# Patient Record
Sex: MALE | Race: ASIAN | NOT HISPANIC OR LATINO
[De-identification: names, ages, dates, MRNs, and addresses within clinical notes are randomized per-mention and may not be internally consistent; named-entity substitution may affect disease eponyms.]

---

## 2017-02-16 ENCOUNTER — MEDICATION RENEWAL (OUTPATIENT)
Age: 77
End: 2017-02-16

## 2017-02-16 ENCOUNTER — LABORATORY RESULT (OUTPATIENT)
Age: 77
End: 2017-02-16

## 2017-02-16 ENCOUNTER — NON-APPOINTMENT (OUTPATIENT)
Age: 77
End: 2017-02-16

## 2017-02-16 ENCOUNTER — APPOINTMENT (OUTPATIENT)
Dept: CARDIOLOGY | Facility: CLINIC | Age: 77
End: 2017-02-16

## 2017-02-16 VITALS
DIASTOLIC BLOOD PRESSURE: 71 MMHG | OXYGEN SATURATION: 83 % | WEIGHT: 172 LBS | BODY MASS INDEX: 26.07 KG/M2 | HEIGHT: 68 IN | HEART RATE: 53 BPM | SYSTOLIC BLOOD PRESSURE: 127 MMHG

## 2017-02-16 DIAGNOSIS — Z01.810 ENCOUNTER FOR PREPROCEDURAL CARDIOVASCULAR EXAMINATION: ICD-10-CM

## 2017-02-16 DIAGNOSIS — Z87.09 PERSONAL HISTORY OF OTHER DISEASES OF THE RESPIRATORY SYSTEM: ICD-10-CM

## 2017-02-17 ENCOUNTER — APPOINTMENT (OUTPATIENT)
Dept: CARDIOLOGY | Facility: CLINIC | Age: 77
End: 2017-02-17

## 2017-02-22 ENCOUNTER — TRANSCRIPTION ENCOUNTER (OUTPATIENT)
Age: 77
End: 2017-02-22

## 2017-02-22 ENCOUNTER — OUTPATIENT (OUTPATIENT)
Dept: OUTPATIENT SERVICES | Facility: HOSPITAL | Age: 77
LOS: 1 days | End: 2017-02-22
Payer: MEDICARE

## 2017-02-22 VITALS
HEART RATE: 55 BPM | DIASTOLIC BLOOD PRESSURE: 82 MMHG | OXYGEN SATURATION: 100 % | SYSTOLIC BLOOD PRESSURE: 138 MMHG | WEIGHT: 170.2 LBS | RESPIRATION RATE: 13 BRPM | HEIGHT: 69 IN | TEMPERATURE: 98 F

## 2017-02-22 VITALS
OXYGEN SATURATION: 99 % | HEART RATE: 50 BPM | DIASTOLIC BLOOD PRESSURE: 76 MMHG | SYSTOLIC BLOOD PRESSURE: 111 MMHG | RESPIRATION RATE: 12 BRPM

## 2017-02-22 DIAGNOSIS — H25.11 AGE-RELATED NUCLEAR CATARACT, RIGHT EYE: ICD-10-CM

## 2017-02-22 DIAGNOSIS — I48.91 UNSPECIFIED ATRIAL FIBRILLATION: Chronic | ICD-10-CM

## 2017-02-22 PROCEDURE — 66984 XCAPSL CTRC RMVL W/O ECP: CPT | Mod: RT

## 2017-02-22 PROCEDURE — C1780: CPT

## 2017-02-22 NOTE — ASU PATIENT PROFILE, ADULT - PMH
AF (atrial fibrillation)    BPH (Benign Prostatic Hyperplasia)    HLD (hyperlipidemia)    HTN (hypertension)    Thoracic aortic aneurysm without rupture

## 2017-02-22 NOTE — ASU DISCHARGE PLAN (ADULT/PEDIATRIC). - PT EDUC
Intraocular lens implant(IOL), Eye shield with instructions , sunglasses and eye kit given to patient./other (specify)/Implant card (specify)

## 2017-02-22 NOTE — ASU DISCHARGE PLAN (ADULT/PEDIATRIC). - NOTIFY
Fever greater than 101/Swelling that continues/Pain not relieved by Medications/Bleeding that does not stop/Persistent Nausea and Vomiting

## 2017-02-23 LAB
25(OH)D3 SERPL-MCNC: 31 NG/ML
ALBUMIN SERPL ELPH-MCNC: 4.1 G/DL
ALP BLD-CCNC: 62 U/L
ALT SERPL-CCNC: 17 U/L
ANION GAP SERPL CALC-SCNC: 15 MMOL/L
APPEARANCE: CLEAR
AST SERPL-CCNC: 24 U/L
BACTERIA: NEGATIVE
BASOPHILS # BLD AUTO: 0.03 K/UL
BASOPHILS NFR BLD AUTO: 0.7 %
BILIRUB SERPL-MCNC: 0.7 MG/DL
BILIRUBIN URINE: NEGATIVE
BLOOD URINE: NEGATIVE
BUN SERPL-MCNC: 18 MG/DL
CALCIUM SERPL-MCNC: 9 MG/DL
CHLORIDE SERPL-SCNC: 101 MMOL/L
CHOLEST SERPL-MCNC: 164 MG/DL
CHOLEST/HDLC SERPL: 2.1 RATIO
CO2 SERPL-SCNC: 24 MMOL/L
COLOR: YELLOW
CREAT SERPL-MCNC: 1.05 MG/DL
EOSINOPHIL # BLD AUTO: 0.06 K/UL
EOSINOPHIL NFR BLD AUTO: 1.5 %
GLUCOSE QUALITATIVE U: NORMAL MG/DL
GLUCOSE SERPL-MCNC: 103 MG/DL
HBA1C MFR BLD HPLC: 5.4 %
HCT VFR BLD CALC: 44.5 %
HDLC SERPL-MCNC: 78 MG/DL
HGB BLD-MCNC: 14.7 G/DL
IMM GRANULOCYTES NFR BLD AUTO: 0.2 %
KETONES URINE: NEGATIVE
LDLC SERPL CALC-MCNC: 74 MG/DL
LEUKOCYTE ESTERASE URINE: NEGATIVE
LYMPHOCYTES # BLD AUTO: 1.33 K/UL
LYMPHOCYTES NFR BLD AUTO: 32.2 %
MAN DIFF?: NORMAL
MCHC RBC-ENTMCNC: 30.5 PG
MCHC RBC-ENTMCNC: 33 GM/DL
MCV RBC AUTO: 92.3 FL
MICROSCOPIC-UA: NORMAL
MONOCYTES # BLD AUTO: 0.25 K/UL
MONOCYTES NFR BLD AUTO: 6.1 %
NEUTROPHILS # BLD AUTO: 2.45 K/UL
NEUTROPHILS NFR BLD AUTO: 59.3 %
NITRITE URINE: NEGATIVE
PH URINE: 5.5
PLATELET # BLD AUTO: 257 K/UL
POTASSIUM SERPL-SCNC: 4.9 MMOL/L
PROT SERPL-MCNC: 6.9 G/DL
PROTEIN URINE: NEGATIVE MG/DL
RBC # BLD: 4.82 M/UL
RBC # FLD: 13.5 %
RED BLOOD CELLS URINE: 0 /HPF
SODIUM SERPL-SCNC: 140 MMOL/L
SPECIFIC GRAVITY URINE: 1.02
SQUAMOUS EPITHELIAL CELLS: 0 /HPF
TRIGL SERPL-MCNC: 58 MG/DL
TSH SERPL-ACNC: 1.8 UIU/ML
UROBILINOGEN URINE: NORMAL MG/DL
WBC # FLD AUTO: 4.13 K/UL
WHITE BLOOD CELLS URINE: 0 /HPF

## 2017-02-28 ENCOUNTER — MEDICATION RENEWAL (OUTPATIENT)
Age: 77
End: 2017-02-28

## 2017-03-27 NOTE — ASU PREOP CHECKLIST - LAST DOSE WITHIN LAST 24HRS
" Outpatient Physical Therapy Progress Note     Time in: 11:00  Time out: 12:00  Ther ex time: 40 min    Visit # 5    Subjective:  Pt states L shld feels "about the same" and rates pain as 0/10.  States doing HEP as instructed.    Objective: Tight posterior capsule.  TTP middle deltoid muscle.    Treatment:  There ex and modalities for increased ROM/strength and improved ADL to include:  UBE x 6 min, theraband x 3 ways, theraband IR/ER side steps, prone rows with 5#, serratus punches with 5#, prone ext 90-0 with 3#, theraband clock, ER ball drop/catch, wall walks with theraband, manual co-contractions, SL ER and HABD with 2#, PROM/stretching x 8 min, HEP review and CP x 10 min.    Assessment:  No pain with exercise. Tolerated increased exercises well.     Will the patient continue to benefit from skilled PT intervention?     yes    Medical necessity is demonstrated by:   Pain limits function of effected part for all activities  Unable to participate fully in daily activities       Progress towards goals: fair    New/Revised Goals:    Plan:  Continue with established Plan of Care toward PT goals.          "
Yes

## 2017-05-22 ENCOUNTER — RX RENEWAL (OUTPATIENT)
Age: 77
End: 2017-05-22

## 2017-06-05 ENCOUNTER — RX RENEWAL (OUTPATIENT)
Age: 77
End: 2017-06-05

## 2017-06-08 ENCOUNTER — MEDICATION RENEWAL (OUTPATIENT)
Age: 77
End: 2017-06-08

## 2017-06-21 DIAGNOSIS — M25.562 PAIN IN LEFT KNEE: ICD-10-CM

## 2017-06-21 DIAGNOSIS — G89.29 PAIN IN LEFT KNEE: ICD-10-CM

## 2017-06-23 ENCOUNTER — EMERGENCY (EMERGENCY)
Facility: HOSPITAL | Age: 77
LOS: 1 days | Discharge: HOME | End: 2017-06-23
Attending: INTERNAL MEDICINE | Admitting: INTERNAL MEDICINE
Payer: MEDICARE

## 2017-06-23 VITALS — HEART RATE: 71 BPM | DIASTOLIC BLOOD PRESSURE: 71 MMHG | SYSTOLIC BLOOD PRESSURE: 129 MMHG | TEMPERATURE: 98 F

## 2017-06-23 VITALS
DIASTOLIC BLOOD PRESSURE: 75 MMHG | OXYGEN SATURATION: 99 % | RESPIRATION RATE: 14 BRPM | TEMPERATURE: 99 F | SYSTOLIC BLOOD PRESSURE: 125 MMHG | HEIGHT: 69 IN | WEIGHT: 169.98 LBS | HEART RATE: 68 BPM

## 2017-06-23 DIAGNOSIS — I48.91 UNSPECIFIED ATRIAL FIBRILLATION: Chronic | ICD-10-CM

## 2017-06-23 LAB
ALBUMIN SERPL ELPH-MCNC: 3.1 G/DL — LOW (ref 3.3–5)
ALP SERPL-CCNC: 67 U/L — SIGNIFICANT CHANGE UP (ref 30–120)
ALT FLD-CCNC: 27 U/L DA — SIGNIFICANT CHANGE UP (ref 10–60)
ANION GAP SERPL CALC-SCNC: 4 MMOL/L — LOW (ref 5–17)
APPEARANCE UR: CLEAR — SIGNIFICANT CHANGE UP
APTT BLD: 29.5 SEC — SIGNIFICANT CHANGE UP (ref 27.5–37.4)
AST SERPL-CCNC: 23 U/L — SIGNIFICANT CHANGE UP (ref 10–40)
BASOPHILS # BLD AUTO: 0.1 K/UL — SIGNIFICANT CHANGE UP (ref 0–0.2)
BASOPHILS NFR BLD AUTO: 0.5 % — SIGNIFICANT CHANGE UP (ref 0–2)
BILIRUB SERPL-MCNC: 0.6 MG/DL — SIGNIFICANT CHANGE UP (ref 0.2–1.2)
BILIRUB UR-MCNC: NEGATIVE — SIGNIFICANT CHANGE UP
BUN SERPL-MCNC: 19 MG/DL — SIGNIFICANT CHANGE UP (ref 7–23)
CALCIUM SERPL-MCNC: 8.4 MG/DL — SIGNIFICANT CHANGE UP (ref 8.4–10.5)
CHLORIDE SERPL-SCNC: 104 MMOL/L — SIGNIFICANT CHANGE UP (ref 96–108)
CO2 SERPL-SCNC: 30 MMOL/L — SIGNIFICANT CHANGE UP (ref 22–31)
COLOR SPEC: YELLOW — SIGNIFICANT CHANGE UP
CREAT SERPL-MCNC: 1.23 MG/DL — SIGNIFICANT CHANGE UP (ref 0.5–1.3)
DIFF PNL FLD: NEGATIVE — SIGNIFICANT CHANGE UP
EOSINOPHIL # BLD AUTO: 0.1 K/UL — SIGNIFICANT CHANGE UP (ref 0–0.5)
EOSINOPHIL NFR BLD AUTO: 0.4 % — SIGNIFICANT CHANGE UP (ref 0–6)
GLUCOSE SERPL-MCNC: 127 MG/DL — HIGH (ref 70–99)
GLUCOSE UR QL: NEGATIVE MG/DL — SIGNIFICANT CHANGE UP
HCT VFR BLD CALC: 43.6 % — SIGNIFICANT CHANGE UP (ref 39–50)
HGB BLD-MCNC: 14 G/DL — SIGNIFICANT CHANGE UP (ref 13–17)
INR BLD: 1.07 RATIO — SIGNIFICANT CHANGE UP (ref 0.88–1.16)
KETONES UR-MCNC: NEGATIVE — SIGNIFICANT CHANGE UP
LACTATE SERPL-SCNC: 0.9 MMOL/L — SIGNIFICANT CHANGE UP (ref 0.7–2)
LEUKOCYTE ESTERASE UR-ACNC: NEGATIVE — SIGNIFICANT CHANGE UP
LYMPHOCYTES # BLD AUTO: 1.2 K/UL — SIGNIFICANT CHANGE UP (ref 1–3.3)
LYMPHOCYTES # BLD AUTO: 9.6 % — LOW (ref 13–44)
MCHC RBC-ENTMCNC: 30.7 PG — SIGNIFICANT CHANGE UP (ref 27–34)
MCHC RBC-ENTMCNC: 32.2 GM/DL — SIGNIFICANT CHANGE UP (ref 32–36)
MCV RBC AUTO: 95.4 FL — SIGNIFICANT CHANGE UP (ref 80–100)
MONOCYTES # BLD AUTO: 1 K/UL — HIGH (ref 0–0.9)
MONOCYTES NFR BLD AUTO: 8.1 % — SIGNIFICANT CHANGE UP (ref 2–14)
NEUTROPHILS # BLD AUTO: 10.2 K/UL — HIGH (ref 1.8–7.4)
NEUTROPHILS NFR BLD AUTO: 81.4 % — HIGH (ref 43–77)
NITRITE UR-MCNC: NEGATIVE — SIGNIFICANT CHANGE UP
PH UR: 7 — SIGNIFICANT CHANGE UP (ref 5–8)
PLATELET # BLD AUTO: 221 K/UL — SIGNIFICANT CHANGE UP (ref 150–400)
POTASSIUM SERPL-MCNC: 4.2 MMOL/L — SIGNIFICANT CHANGE UP (ref 3.5–5.3)
POTASSIUM SERPL-SCNC: 4.2 MMOL/L — SIGNIFICANT CHANGE UP (ref 3.5–5.3)
PROT SERPL-MCNC: 6.9 G/DL — SIGNIFICANT CHANGE UP (ref 6–8.3)
PROT UR-MCNC: NEGATIVE MG/DL — SIGNIFICANT CHANGE UP
PROTHROM AB SERPL-ACNC: 11.7 SEC — SIGNIFICANT CHANGE UP (ref 9.8–12.7)
RBC # BLD: 4.57 M/UL — SIGNIFICANT CHANGE UP (ref 4.2–5.8)
RBC # FLD: 12.1 % — SIGNIFICANT CHANGE UP (ref 10.3–14.5)
SODIUM SERPL-SCNC: 138 MMOL/L — SIGNIFICANT CHANGE UP (ref 135–145)
SP GR SPEC: 1.01 — SIGNIFICANT CHANGE UP (ref 1.01–1.02)
UROBILINOGEN FLD QL: NEGATIVE MG/DL — SIGNIFICANT CHANGE UP
WBC # BLD: 12.5 K/UL — HIGH (ref 3.8–10.5)
WBC # FLD AUTO: 12.5 K/UL — HIGH (ref 3.8–10.5)

## 2017-06-23 PROCEDURE — 85027 COMPLETE CBC AUTOMATED: CPT

## 2017-06-23 PROCEDURE — 83605 ASSAY OF LACTIC ACID: CPT

## 2017-06-23 PROCEDURE — 85610 PROTHROMBIN TIME: CPT

## 2017-06-23 PROCEDURE — 80053 COMPREHEN METABOLIC PANEL: CPT

## 2017-06-23 PROCEDURE — 96361 HYDRATE IV INFUSION ADD-ON: CPT

## 2017-06-23 PROCEDURE — 96374 THER/PROPH/DIAG INJ IV PUSH: CPT

## 2017-06-23 PROCEDURE — 99284 EMERGENCY DEPT VISIT MOD MDM: CPT | Mod: 25

## 2017-06-23 PROCEDURE — 99285 EMERGENCY DEPT VISIT HI MDM: CPT

## 2017-06-23 PROCEDURE — 87040 BLOOD CULTURE FOR BACTERIA: CPT

## 2017-06-23 PROCEDURE — 81003 URINALYSIS AUTO W/O SCOPE: CPT

## 2017-06-23 PROCEDURE — 85730 THROMBOPLASTIN TIME PARTIAL: CPT

## 2017-06-23 PROCEDURE — 87086 URINE CULTURE/COLONY COUNT: CPT

## 2017-06-23 RX ORDER — PIPERACILLIN AND TAZOBACTAM 4; .5 G/20ML; G/20ML
3.38 INJECTION, POWDER, LYOPHILIZED, FOR SOLUTION INTRAVENOUS ONCE
Qty: 0 | Refills: 0 | Status: COMPLETED | OUTPATIENT
Start: 2017-06-23 | End: 2017-06-23

## 2017-06-23 RX ORDER — SODIUM CHLORIDE 9 MG/ML
1310 INJECTION INTRAMUSCULAR; INTRAVENOUS; SUBCUTANEOUS ONCE
Qty: 0 | Refills: 0 | Status: COMPLETED | OUTPATIENT
Start: 2017-06-23 | End: 2017-06-23

## 2017-06-23 RX ORDER — SODIUM CHLORIDE 9 MG/ML
1000 INJECTION INTRAMUSCULAR; INTRAVENOUS; SUBCUTANEOUS ONCE
Qty: 0 | Refills: 0 | Status: COMPLETED | OUTPATIENT
Start: 2017-06-23 | End: 2017-06-23

## 2017-06-23 RX ADMIN — SODIUM CHLORIDE 1310 MILLILITER(S): 9 INJECTION INTRAMUSCULAR; INTRAVENOUS; SUBCUTANEOUS at 22:40

## 2017-06-23 RX ADMIN — PIPERACILLIN AND TAZOBACTAM 200 GRAM(S): 4; .5 INJECTION, POWDER, LYOPHILIZED, FOR SOLUTION INTRAVENOUS at 22:18

## 2017-06-23 RX ADMIN — SODIUM CHLORIDE 2000 MILLILITER(S): 9 INJECTION INTRAMUSCULAR; INTRAVENOUS; SUBCUTANEOUS at 22:00

## 2017-06-23 NOTE — ED PROVIDER NOTE - OBJECTIVE STATEMENT
78 y/o M with pmhx of prostatitis and 1 episode of sepsis secondary to prostatitis 4 years ago presents to the ED c/o urinary symptoms. Pt states he has been experiencing fever and chills since yesterday and urinary sx consistent with prostatitis. Previous episode of sepsis due to questionable multi-drug resistant bacteria. Started himself on Levaquin, taken yesterday and this morning. Denies CP, SOB or any other complaints at this time. 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 Pt states he has been experiencing fever and chills since yesterday and urinary sx consistent with prostatitis. Previous episode of sepsis due to questionable multi-drug resistant bacteria. Started himself on Levaquin, taken yesterday and this morning. Denies CP, SOB or any other complaints at this time.

## 2017-06-23 NOTE — ED PROVIDER NOTE - MEDICAL DECISION MAKING DETAILS
urologist with h/o prostatitis/sepsis in past...x yest with fever/chills and urinary sx rxd by self with levaquin...ua today totally nl..rxd in er with septic protocol i.e. zosyn,ivf..lactate nl...will go home and rt er if sx worsen. urologist with h/o prostatitis/sepsis in past...since yest with fever/chills and urinary sx rxd by self with levaquin...ua today totally nl..rxd in er with septic protocol i.e. zosyn,ivf..lactate nl...will go home and rt er if sx worsen.will continue levaquin.

## 2017-06-25 LAB
CULTURE RESULTS: NO GROWTH — SIGNIFICANT CHANGE UP
SPECIMEN SOURCE: SIGNIFICANT CHANGE UP

## 2017-06-29 LAB
CULTURE RESULTS: SIGNIFICANT CHANGE UP
CULTURE RESULTS: SIGNIFICANT CHANGE UP
SPECIMEN SOURCE: SIGNIFICANT CHANGE UP
SPECIMEN SOURCE: SIGNIFICANT CHANGE UP

## 2017-08-03 PROBLEM — M25.562 CHRONIC PAIN OF LEFT KNEE: Status: ACTIVE | Noted: 2017-08-03

## 2017-08-17 ENCOUNTER — APPOINTMENT (OUTPATIENT)
Dept: CARDIOLOGY | Facility: CLINIC | Age: 77
End: 2017-08-17

## 2017-08-21 ENCOUNTER — RX RENEWAL (OUTPATIENT)
Age: 77
End: 2017-08-21

## 2017-08-23 ENCOUNTER — RX RENEWAL (OUTPATIENT)
Age: 77
End: 2017-08-23

## 2017-10-23 ENCOUNTER — APPOINTMENT (OUTPATIENT)
Dept: CARDIOLOGY | Facility: CLINIC | Age: 77
End: 2017-10-23
Payer: MEDICARE

## 2017-10-23 VITALS
HEIGHT: 68 IN | DIASTOLIC BLOOD PRESSURE: 80 MMHG | SYSTOLIC BLOOD PRESSURE: 143 MMHG | HEART RATE: 50 BPM | OXYGEN SATURATION: 95 %

## 2017-10-23 VITALS — HEIGHT: 68 IN

## 2017-10-23 PROCEDURE — G0008: CPT

## 2017-10-23 PROCEDURE — 93000 ELECTROCARDIOGRAM COMPLETE: CPT

## 2017-10-23 PROCEDURE — 90686 IIV4 VACC NO PRSV 0.5 ML IM: CPT

## 2017-10-23 PROCEDURE — 99214 OFFICE O/P EST MOD 30 MIN: CPT | Mod: 25

## 2017-10-24 ENCOUNTER — NON-APPOINTMENT (OUTPATIENT)
Age: 77
End: 2017-10-24

## 2018-01-15 ENCOUNTER — NON-APPOINTMENT (OUTPATIENT)
Age: 78
End: 2018-01-15

## 2018-01-15 ENCOUNTER — APPOINTMENT (OUTPATIENT)
Dept: CARDIOLOGY | Facility: CLINIC | Age: 78
End: 2018-01-15
Payer: MEDICARE

## 2018-01-15 VITALS
BODY MASS INDEX: 26.67 KG/M2 | DIASTOLIC BLOOD PRESSURE: 76 MMHG | OXYGEN SATURATION: 96 % | WEIGHT: 176 LBS | SYSTOLIC BLOOD PRESSURE: 117 MMHG | HEART RATE: 74 BPM | HEIGHT: 68 IN

## 2018-01-15 PROCEDURE — 99215 OFFICE O/P EST HI 40 MIN: CPT | Mod: 25

## 2018-01-15 PROCEDURE — 93000 ELECTROCARDIOGRAM COMPLETE: CPT

## 2018-01-15 PROCEDURE — 36415 COLL VENOUS BLD VENIPUNCTURE: CPT

## 2018-01-16 ENCOUNTER — APPOINTMENT (OUTPATIENT)
Dept: CARDIOLOGY | Facility: CLINIC | Age: 78
End: 2018-01-16
Payer: MEDICARE

## 2018-01-16 LAB
25(OH)D3 SERPL-MCNC: 32.5 NG/ML
ALBUMIN SERPL ELPH-MCNC: 4.2 G/DL
ALP BLD-CCNC: 68 U/L
ALT SERPL-CCNC: 20 U/L
ANION GAP SERPL CALC-SCNC: 13 MMOL/L
AST SERPL-CCNC: 20 U/L
BASOPHILS # BLD AUTO: 0.02 K/UL
BASOPHILS NFR BLD AUTO: 0.5 %
BILIRUB SERPL-MCNC: 0.4 MG/DL
BUN SERPL-MCNC: 26 MG/DL
CALCIUM SERPL-MCNC: 9.1 MG/DL
CHLORIDE SERPL-SCNC: 104 MMOL/L
CHOLEST SERPL-MCNC: 174 MG/DL
CHOLEST/HDLC SERPL: 2.4 RATIO
CO2 SERPL-SCNC: 27 MMOL/L
CREAT SERPL-MCNC: 1.26 MG/DL
EOSINOPHIL # BLD AUTO: 0.15 K/UL
EOSINOPHIL NFR BLD AUTO: 3.4 %
GLUCOSE SERPL-MCNC: 104 MG/DL
HBA1C MFR BLD HPLC: 5.4 %
HCT VFR BLD CALC: 47.9 %
HDLC SERPL-MCNC: 73 MG/DL
HGB BLD-MCNC: 16 G/DL
IMM GRANULOCYTES NFR BLD AUTO: 0 %
LDLC SERPL CALC-MCNC: 77 MG/DL
LYMPHOCYTES # BLD AUTO: 1.34 K/UL
LYMPHOCYTES NFR BLD AUTO: 30.6 %
MAGNESIUM SERPL-MCNC: 2.3 MG/DL
MAN DIFF?: NORMAL
MCHC RBC-ENTMCNC: 31 PG
MCHC RBC-ENTMCNC: 33.4 GM/DL
MCV RBC AUTO: 92.8 FL
MONOCYTES # BLD AUTO: 0.38 K/UL
MONOCYTES NFR BLD AUTO: 8.7 %
NEUTROPHILS # BLD AUTO: 2.49 K/UL
NEUTROPHILS NFR BLD AUTO: 56.8 %
PLATELET # BLD AUTO: 280 K/UL
POTASSIUM SERPL-SCNC: 5.2 MMOL/L
PROT SERPL-MCNC: 7.2 G/DL
PSA FREE FLD-MCNC: 23.4
PSA FREE SERPL-MCNC: 0.3 NG/ML
PSA SERPL-MCNC: 1.28 NG/ML
RBC # BLD: 5.16 M/UL
RBC # FLD: 13.7 %
SODIUM SERPL-SCNC: 144 MMOL/L
TRIGL SERPL-MCNC: 118 MG/DL
TSH SERPL-ACNC: 1.88 UIU/ML
WBC # FLD AUTO: 4.38 K/UL

## 2018-01-16 PROCEDURE — 93306 TTE W/DOPPLER COMPLETE: CPT

## 2018-01-17 ENCOUNTER — NON-APPOINTMENT (OUTPATIENT)
Age: 78
End: 2018-01-17

## 2018-01-17 ENCOUNTER — TRANSCRIPTION ENCOUNTER (OUTPATIENT)
Age: 78
End: 2018-01-17

## 2018-01-17 ENCOUNTER — APPOINTMENT (OUTPATIENT)
Dept: ELECTROPHYSIOLOGY | Facility: CLINIC | Age: 78
End: 2018-01-17
Payer: MEDICARE

## 2018-01-17 VITALS
BODY MASS INDEX: 26.07 KG/M2 | WEIGHT: 172 LBS | SYSTOLIC BLOOD PRESSURE: 113 MMHG | HEART RATE: 62 BPM | DIASTOLIC BLOOD PRESSURE: 81 MMHG | HEIGHT: 68 IN

## 2018-01-17 PROCEDURE — 93000 ELECTROCARDIOGRAM COMPLETE: CPT

## 2018-01-17 PROCEDURE — 99205 OFFICE O/P NEW HI 60 MIN: CPT

## 2018-01-19 RX ORDER — METOPROLOL TARTRATE 50 MG
0 TABLET ORAL
Qty: 0 | Refills: 0 | COMMUNITY

## 2018-01-19 RX ORDER — METOPROLOL TARTRATE 50 MG
1 TABLET ORAL
Qty: 0 | Refills: 0 | COMMUNITY

## 2018-01-22 ENCOUNTER — OUTPATIENT (OUTPATIENT)
Dept: OUTPATIENT SERVICES | Facility: HOSPITAL | Age: 78
LOS: 1 days | Discharge: HOME | End: 2018-01-22
Payer: MEDICARE

## 2018-01-22 VITALS
HEART RATE: 52 BPM | RESPIRATION RATE: 16 BRPM | OXYGEN SATURATION: 95 % | SYSTOLIC BLOOD PRESSURE: 115 MMHG | DIASTOLIC BLOOD PRESSURE: 71 MMHG

## 2018-01-22 VITALS
WEIGHT: 171.96 LBS | RESPIRATION RATE: 19 BRPM | SYSTOLIC BLOOD PRESSURE: 140 MMHG | DIASTOLIC BLOOD PRESSURE: 82 MMHG | HEIGHT: 69 IN | HEART RATE: 90 BPM | OXYGEN SATURATION: 100 % | TEMPERATURE: 98 F

## 2018-01-22 DIAGNOSIS — I48.91 UNSPECIFIED ATRIAL FIBRILLATION: Chronic | ICD-10-CM

## 2018-01-22 PROCEDURE — 93314 ECHO TRANSESOPHAGEAL: CPT | Mod: 26,59

## 2018-01-22 PROCEDURE — 93350 STRESS TTE ONLY: CPT | Mod: 26

## 2018-01-22 PROCEDURE — 93010 ELECTROCARDIOGRAM REPORT: CPT | Mod: 76

## 2018-01-22 PROCEDURE — 93312 ECHO TRANSESOPHAGEAL: CPT | Mod: 26

## 2018-01-22 PROCEDURE — 92960 CARDIOVERSION ELECTRIC EXT: CPT

## 2018-01-22 PROCEDURE — 93320 DOPPLER ECHO COMPLETE: CPT | Mod: 26

## 2018-01-22 NOTE — PROCEDURAL SAFETY CHECKLIST WITH OR WITHOUT SEDATION - NSPRESEDATIONFT_GEN_ALL_CORE
Physician confirms case reviewed for anesthesia consultation requirements.
Physician confirms case reviewed for anesthesia consultation requirements.

## 2018-01-22 NOTE — PACU DISCHARGE NOTE - COMMENTS
Pt. verb. agreement and understanding to and teach back to written and verbal discharge instructions.  Pt. discharged to home via private auto accompanied by wife.

## 2018-01-22 NOTE — PROCEDURAL SAFETY CHECKLIST WITH OR WITHOUT SEDATION - NSPRESEDATION2FT_GEN_ALL_CORE
Anesthesia confirms case reviewed for anesthesia risk alert.
Anesthesia confirms case reviewed for anesthesia risk alert.

## 2018-01-25 DIAGNOSIS — Z79.01 LONG TERM (CURRENT) USE OF ANTICOAGULANTS: ICD-10-CM

## 2018-01-25 DIAGNOSIS — I71.2 THORACIC AORTIC ANEURYSM, WITHOUT RUPTURE: ICD-10-CM

## 2018-01-25 DIAGNOSIS — I10 ESSENTIAL (PRIMARY) HYPERTENSION: ICD-10-CM

## 2018-01-25 DIAGNOSIS — I48.91 UNSPECIFIED ATRIAL FIBRILLATION: ICD-10-CM

## 2018-01-25 DIAGNOSIS — I34.0 NONRHEUMATIC MITRAL (VALVE) INSUFFICIENCY: ICD-10-CM

## 2018-01-25 DIAGNOSIS — E78.5 HYPERLIPIDEMIA, UNSPECIFIED: ICD-10-CM

## 2018-02-02 ENCOUNTER — APPOINTMENT (OUTPATIENT)
Dept: CARDIOLOGY | Facility: CLINIC | Age: 78
End: 2018-02-02
Payer: MEDICARE

## 2018-02-02 ENCOUNTER — NON-APPOINTMENT (OUTPATIENT)
Age: 78
End: 2018-02-02

## 2018-02-02 PROCEDURE — 99214 OFFICE O/P EST MOD 30 MIN: CPT | Mod: 25

## 2018-02-02 PROCEDURE — 93000 ELECTROCARDIOGRAM COMPLETE: CPT

## 2018-03-23 ENCOUNTER — INPATIENT (INPATIENT)
Facility: HOSPITAL | Age: 78
LOS: 2 days | Discharge: HOME | DRG: 728 | End: 2018-03-26
Attending: INTERNAL MEDICINE | Admitting: INTERNAL MEDICINE
Payer: MEDICARE

## 2018-03-23 VITALS
HEART RATE: 88 BPM | SYSTOLIC BLOOD PRESSURE: 142 MMHG | OXYGEN SATURATION: 96 % | RESPIRATION RATE: 18 BRPM | TEMPERATURE: 100 F | DIASTOLIC BLOOD PRESSURE: 82 MMHG

## 2018-03-23 DIAGNOSIS — I48.91 UNSPECIFIED ATRIAL FIBRILLATION: Chronic | ICD-10-CM

## 2018-03-23 DIAGNOSIS — A41.9 SEPSIS, UNSPECIFIED ORGANISM: ICD-10-CM

## 2018-03-23 LAB
ALBUMIN SERPL ELPH-MCNC: 3.3 G/DL — SIGNIFICANT CHANGE UP (ref 3.3–5)
ALP SERPL-CCNC: 82 U/L — SIGNIFICANT CHANGE UP (ref 30–120)
ALT FLD-CCNC: 55 U/L DA — SIGNIFICANT CHANGE UP (ref 10–60)
ANION GAP SERPL CALC-SCNC: 5 MMOL/L — SIGNIFICANT CHANGE UP (ref 5–17)
APPEARANCE UR: CLEAR — SIGNIFICANT CHANGE UP
AST SERPL-CCNC: 46 U/L — HIGH (ref 10–40)
BACTERIA # UR AUTO: ABNORMAL
BASOPHILS # BLD AUTO: 0 K/UL — SIGNIFICANT CHANGE UP (ref 0–0.2)
BASOPHILS NFR BLD AUTO: 0.3 % — SIGNIFICANT CHANGE UP (ref 0–2)
BILIRUB SERPL-MCNC: 0.7 MG/DL — SIGNIFICANT CHANGE UP (ref 0.2–1.2)
BILIRUB UR-MCNC: NEGATIVE — SIGNIFICANT CHANGE UP
BUN SERPL-MCNC: 20 MG/DL — SIGNIFICANT CHANGE UP (ref 7–23)
CALCIUM SERPL-MCNC: 8.8 MG/DL — SIGNIFICANT CHANGE UP (ref 8.4–10.5)
CHLORIDE SERPL-SCNC: 97 MMOL/L — SIGNIFICANT CHANGE UP (ref 96–108)
CO2 SERPL-SCNC: 28 MMOL/L — SIGNIFICANT CHANGE UP (ref 22–31)
COLOR SPEC: YELLOW — SIGNIFICANT CHANGE UP
COMMENT - URINE: SIGNIFICANT CHANGE UP
CREAT SERPL-MCNC: 1.16 MG/DL — SIGNIFICANT CHANGE UP (ref 0.5–1.3)
DIFF PNL FLD: ABNORMAL
EOSINOPHIL # BLD AUTO: 0 K/UL — SIGNIFICANT CHANGE UP (ref 0–0.5)
EOSINOPHIL NFR BLD AUTO: 0 % — SIGNIFICANT CHANGE UP (ref 0–6)
EPI CELLS # UR: SIGNIFICANT CHANGE UP
GLUCOSE SERPL-MCNC: 121 MG/DL — HIGH (ref 70–99)
GLUCOSE UR QL: NEGATIVE MG/DL — SIGNIFICANT CHANGE UP
HCT VFR BLD CALC: 43.7 % — SIGNIFICANT CHANGE UP (ref 39–50)
HGB BLD-MCNC: 14.7 G/DL — SIGNIFICANT CHANGE UP (ref 13–17)
KETONES UR-MCNC: ABNORMAL
LACTATE SERPL-SCNC: 2 MMOL/L — SIGNIFICANT CHANGE UP (ref 0.7–2)
LEUKOCYTE ESTERASE UR-ACNC: ABNORMAL
LYMPHOCYTES # BLD AUTO: 0.4 K/UL — LOW (ref 1–3.3)
LYMPHOCYTES # BLD AUTO: 4.5 % — LOW (ref 13–44)
MCHC RBC-ENTMCNC: 31 PG — SIGNIFICANT CHANGE UP (ref 27–34)
MCHC RBC-ENTMCNC: 33.6 GM/DL — SIGNIFICANT CHANGE UP (ref 32–36)
MCV RBC AUTO: 92.3 FL — SIGNIFICANT CHANGE UP (ref 80–100)
MONOCYTES # BLD AUTO: 0.3 K/UL — SIGNIFICANT CHANGE UP (ref 0–0.9)
MONOCYTES NFR BLD AUTO: 3.5 % — SIGNIFICANT CHANGE UP (ref 2–14)
NEUTROPHILS # BLD AUTO: 7.9 K/UL — HIGH (ref 1.8–7.4)
NEUTROPHILS NFR BLD AUTO: 91.7 % — HIGH (ref 43–77)
NITRITE UR-MCNC: NEGATIVE — SIGNIFICANT CHANGE UP
PH UR: 5 — SIGNIFICANT CHANGE UP (ref 5–8)
PLATELET # BLD AUTO: 196 K/UL — SIGNIFICANT CHANGE UP (ref 150–400)
POTASSIUM SERPL-MCNC: 3.7 MMOL/L — SIGNIFICANT CHANGE UP (ref 3.5–5.3)
POTASSIUM SERPL-SCNC: 3.7 MMOL/L — SIGNIFICANT CHANGE UP (ref 3.5–5.3)
PROT SERPL-MCNC: 8.1 G/DL — SIGNIFICANT CHANGE UP (ref 6–8.3)
PROT UR-MCNC: 100 MG/DL
RAPID RVP RESULT: SIGNIFICANT CHANGE UP
RBC # BLD: 4.73 M/UL — SIGNIFICANT CHANGE UP (ref 4.2–5.8)
RBC # FLD: 12.1 % — SIGNIFICANT CHANGE UP (ref 10.3–14.5)
RBC CASTS # UR COMP ASSIST: ABNORMAL /HPF (ref 0–4)
SODIUM SERPL-SCNC: 130 MMOL/L — LOW (ref 135–145)
SP GR SPEC: 1.02 — SIGNIFICANT CHANGE UP (ref 1.01–1.02)
UROBILINOGEN FLD QL: NEGATIVE MG/DL — SIGNIFICANT CHANGE UP
WBC # BLD: 8.7 K/UL — SIGNIFICANT CHANGE UP (ref 3.8–10.5)
WBC # FLD AUTO: 8.7 K/UL — SIGNIFICANT CHANGE UP (ref 3.8–10.5)
WBC UR QL: ABNORMAL

## 2018-03-23 PROCEDURE — 99285 EMERGENCY DEPT VISIT HI MDM: CPT

## 2018-03-23 PROCEDURE — 93010 ELECTROCARDIOGRAM REPORT: CPT

## 2018-03-23 PROCEDURE — 71046 X-RAY EXAM CHEST 2 VIEWS: CPT | Mod: 26

## 2018-03-23 RX ORDER — RIVAROXABAN 15 MG-20MG
20 KIT ORAL EVERY 24 HOURS
Qty: 0 | Refills: 0 | Status: DISCONTINUED | OUTPATIENT
Start: 2018-03-23 | End: 2018-03-26

## 2018-03-23 RX ORDER — LACTOBACILLUS ACIDOPHILUS 100MM CELL
1 CAPSULE ORAL
Qty: 0 | Refills: 0 | Status: DISCONTINUED | OUTPATIENT
Start: 2018-03-23 | End: 2018-03-26

## 2018-03-23 RX ORDER — IPRATROPIUM/ALBUTEROL SULFATE 18-103MCG
3 AEROSOL WITH ADAPTER (GRAM) INHALATION EVERY 6 HOURS
Qty: 0 | Refills: 0 | Status: DISCONTINUED | OUTPATIENT
Start: 2018-03-23 | End: 2018-03-24

## 2018-03-23 RX ORDER — LOSARTAN POTASSIUM 100 MG/1
100 TABLET, FILM COATED ORAL DAILY
Qty: 0 | Refills: 0 | Status: DISCONTINUED | OUTPATIENT
Start: 2018-03-23 | End: 2018-03-26

## 2018-03-23 RX ORDER — IMIPENEM AND CILASTATIN 250; 250 MG/100ML; MG/100ML
500 INJECTION, POWDER, FOR SOLUTION INTRAVENOUS EVERY 6 HOURS
Qty: 0 | Refills: 0 | Status: DISCONTINUED | OUTPATIENT
Start: 2018-03-23 | End: 2018-03-25

## 2018-03-23 RX ORDER — TAMSULOSIN HYDROCHLORIDE 0.4 MG/1
0.8 CAPSULE ORAL AT BEDTIME
Qty: 0 | Refills: 0 | Status: DISCONTINUED | OUTPATIENT
Start: 2018-03-23 | End: 2018-03-26

## 2018-03-23 RX ORDER — FINASTERIDE 5 MG/1
5 TABLET, FILM COATED ORAL DAILY
Qty: 0 | Refills: 0 | Status: DISCONTINUED | OUTPATIENT
Start: 2018-03-23 | End: 2018-03-26

## 2018-03-23 RX ORDER — METOPROLOL TARTRATE 50 MG
25 TABLET ORAL AT BEDTIME
Qty: 0 | Refills: 0 | Status: DISCONTINUED | OUTPATIENT
Start: 2018-03-23 | End: 2018-03-26

## 2018-03-23 RX ORDER — ACETAMINOPHEN 500 MG
650 TABLET ORAL ONCE
Qty: 0 | Refills: 0 | Status: COMPLETED | OUTPATIENT
Start: 2018-03-23 | End: 2018-03-23

## 2018-03-23 RX ORDER — AMLODIPINE BESYLATE 2.5 MG/1
10 TABLET ORAL DAILY
Qty: 0 | Refills: 0 | Status: DISCONTINUED | OUTPATIENT
Start: 2018-03-23 | End: 2018-03-26

## 2018-03-23 RX ORDER — SODIUM CHLORIDE 9 MG/ML
1000 INJECTION INTRAMUSCULAR; INTRAVENOUS; SUBCUTANEOUS
Qty: 0 | Refills: 0 | Status: COMPLETED | OUTPATIENT
Start: 2018-03-23 | End: 2018-03-23

## 2018-03-23 RX ORDER — METOPROLOL TARTRATE 50 MG
50 TABLET ORAL DAILY
Qty: 0 | Refills: 0 | Status: DISCONTINUED | OUTPATIENT
Start: 2018-03-23 | End: 2018-03-26

## 2018-03-23 RX ORDER — ACETAMINOPHEN 500 MG
650 TABLET ORAL EVERY 6 HOURS
Qty: 0 | Refills: 0 | Status: DISCONTINUED | OUTPATIENT
Start: 2018-03-23 | End: 2018-03-26

## 2018-03-23 RX ORDER — ATORVASTATIN CALCIUM 80 MG/1
10 TABLET, FILM COATED ORAL AT BEDTIME
Qty: 0 | Refills: 0 | Status: DISCONTINUED | OUTPATIENT
Start: 2018-03-23 | End: 2018-03-26

## 2018-03-23 RX ORDER — METOPROLOL TARTRATE 50 MG
1 TABLET ORAL
Qty: 0 | Refills: 0 | COMMUNITY

## 2018-03-23 RX ORDER — IMIPENEM AND CILASTATIN 250; 250 MG/100ML; MG/100ML
500 INJECTION, POWDER, FOR SOLUTION INTRAVENOUS ONCE
Qty: 0 | Refills: 0 | Status: COMPLETED | OUTPATIENT
Start: 2018-03-23 | End: 2018-03-23

## 2018-03-23 RX ORDER — SODIUM CHLORIDE 9 MG/ML
3 INJECTION INTRAMUSCULAR; INTRAVENOUS; SUBCUTANEOUS ONCE
Qty: 0 | Refills: 0 | Status: COMPLETED | OUTPATIENT
Start: 2018-03-23 | End: 2018-03-23

## 2018-03-23 RX ADMIN — SODIUM CHLORIDE 1000 MILLILITER(S): 9 INJECTION INTRAMUSCULAR; INTRAVENOUS; SUBCUTANEOUS at 18:10

## 2018-03-23 RX ADMIN — TAMSULOSIN HYDROCHLORIDE 0.8 MILLIGRAM(S): 0.4 CAPSULE ORAL at 21:37

## 2018-03-23 RX ADMIN — ATORVASTATIN CALCIUM 10 MILLIGRAM(S): 80 TABLET, FILM COATED ORAL at 21:37

## 2018-03-23 RX ADMIN — Medication 25 MILLIGRAM(S): at 21:38

## 2018-03-23 RX ADMIN — SODIUM CHLORIDE 1000 MILLILITER(S): 9 INJECTION INTRAMUSCULAR; INTRAVENOUS; SUBCUTANEOUS at 18:45

## 2018-03-23 RX ADMIN — Medication 650 MILLIGRAM(S): at 18:03

## 2018-03-23 RX ADMIN — RIVAROXABAN 20 MILLIGRAM(S): KIT at 21:37

## 2018-03-23 RX ADMIN — Medication 10 MILLIGRAM(S): at 18:45

## 2018-03-23 RX ADMIN — Medication 3 MILLILITER(S): at 18:23

## 2018-03-23 RX ADMIN — FINASTERIDE 5 MILLIGRAM(S): 5 TABLET, FILM COATED ORAL at 21:37

## 2018-03-23 RX ADMIN — IMIPENEM AND CILASTATIN 100 MILLIGRAM(S): 250; 250 INJECTION, POWDER, FOR SOLUTION INTRAVENOUS at 18:05

## 2018-03-23 RX ADMIN — SODIUM CHLORIDE 1000 MILLILITER(S): 9 INJECTION INTRAMUSCULAR; INTRAVENOUS; SUBCUTANEOUS at 17:30

## 2018-03-23 RX ADMIN — SODIUM CHLORIDE 3 MILLILITER(S): 9 INJECTION INTRAMUSCULAR; INTRAVENOUS; SUBCUTANEOUS at 17:35

## 2018-03-23 NOTE — PATIENT PROFILE ADULT. - TEACHING/LEARNING LEARNING PREFERENCES OTHER
pictorial/group instruction/skill demonstration/written material/video/verbal instruction/audio/computer/internet

## 2018-03-23 NOTE — ED PROVIDER NOTE - NS_ ATTENDINGSCRIBEDETAILS _ED_A_ED_FT
Lyle Reddy MD - The scribe's documentation has been prepared under my direction and personally reviewed by me in its entirety. I confirm that the note above accurately reflects all work, treatment, procedures, and medical decision making performed by me.

## 2018-03-23 NOTE — H&P ADULT - PMH
AF (atrial fibrillation)    BPH (Benign Prostatic Hyperplasia)    HLD (hyperlipidemia)    HTN (hypertension)    Sepsis due to Escherichia coli    Thoracic aortic aneurysm without rupture AF (atrial fibrillation)    BPH (Benign Prostatic Hyperplasia)    HLD (hyperlipidemia)    HTN (hypertension)    Sepsis due to Escherichia coli    Thoracic aortic aneurysm without rupture    UTI (urinary tract infection)

## 2018-03-23 NOTE — H&P ADULT - NSHPLABSRESULTS_GEN_ALL_CORE
130<L>  |  97  |  20  ----------------------------<  121<H>  3.7   |  28  |  1.16    Ca    8.8      23 Mar 2018 17:53    TPro  8.1  /  Alb  3.3  /  TBili  0.7  /  DBili  x   /  AST  46<H>  /  ALT  55  /  AlkPhos  82                              14.7   8.7   )-----------( 196      ( 23 Mar 2018 17:53 )             43.7             LIVER FUNCTIONS - ( 23 Mar 2018 17:53 )  Alb: 3.3 g/dL / Pro: 8.1 g/dL / ALK PHOS: 82 U/L / ALT: 55 U/L DA / AST: 46 U/L / GGT: x                 Urinalysis Basic - ( 23 Mar 2018 17:53 )    Color: Yellow / Appearance: Clear / S.020 / pH: x  Gluc: x / Ketone: Trace  / Bili: Negative / Urobili: Negative mg/dL   Blood: x / Protein: 100 mg/dL / Nitrite: Negative   Leuk Esterase: Moderate / RBC: 11-25 /HPF / WBC 11-25   Sq Epi: x / Non Sq Epi: Occasional / Bacteria: Few

## 2018-03-23 NOTE — ED PROVIDER NOTE - OBJECTIVE STATEMENT
Pt is a 77 y/o M, with PMHx of BPH, urosepsis secondary to E. coli, HTN, HLD, and Afib (on xarelto), presenting to the ED with c/o urinary sxs, onset 2 days ago. Pt reports urinary frequency, urinary urgency, and dysuria. Had fever today with Tmax 101.2F. Denies abd pain. 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 States his last episode of urosepsis was resistant to one unknown abx.

## 2018-03-23 NOTE — ED PROVIDER NOTE - PMH
AF (atrial fibrillation)    BPH (Benign Prostatic Hyperplasia)    HLD (hyperlipidemia)    HTN (hypertension)    Sepsis due to Escherichia coli    Thoracic aortic aneurysm without rupture

## 2018-03-23 NOTE — ED ADULT NURSE NOTE - CAS EDN DISCHARGE ASSESSMENT
No adverse reaction to first time med in ED/Awake/Symptoms improved/Alert and oriented to person, place and time

## 2018-03-23 NOTE — H&P ADULT - NSHPPHYSICALEXAM_GEN_ALL_CORE
· CONSTITUTIONAL: Well appearing, well nourished, awake, alert, oriented to person, place, time/situation and in no apparent distress.  · ENMT: Airway patent  · EYES: Clear bilaterally, pupils equal, round and reactive to light.  · CARDIAC: Normal rate, regular rhythm.  Heart sounds S1, S2.  No murmurs, rubs or gallops.  · RESPIRATORY: Breath sounds clear and equal bilaterally.  · GASTROINTESTINAL: Abdomen soft, non-tender, no guarding.  · NEUROLOGICAL: Alert and oriented, no focal deficits, no motor or sensory deficits.  · SKIN: Skin normal color for race, warm, dry and intact. No evidence of rash.

## 2018-03-23 NOTE — H&P ADULT - HISTORY OF PRESENT ILLNESS
Pt is a 79 y/o M, with PMHx of BPH, urosepsis secondary to E. coli, HTN, HLD, and Afib (on xarelto), presenting to the ED with c/o urinary sxs, onset 2 days ago. Pt reports urinary frequency, urinary urgency, and dysuria. Had fever today with Tmax 101.2F. Denies abd pain. 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 States his last episode of urosepsis was resistant to one unknown abx.    Patient is being admitted for further work up and treatment.

## 2018-03-23 NOTE — ED ADULT NURSE NOTE - OBJECTIVE STATEMENT
Complaining of pain on urination, frequency, fever x 2 days. Hx UTI/ Urosepsis in the past. Started Levaquin x 2 days but not better.

## 2018-03-23 NOTE — ED ADULT NURSE REASSESSMENT NOTE - NS ED NURSE REASSESS COMMENT FT1
Pt received awake, alert and conversant with clear speech. Pt reports feeling better, is breathing easily, respirations are even and unlabored, pt has dry intermittent cough, expiratory wheeze heard posteriorly BUL's. HR is regular, strong peripheral pulses, skin is hot to touch. Pt ate most of a turkey sandwich, no N/V, pt without any abdominal pain, abdomen is round and soft, +BS. NS is infusing via right IVL

## 2018-03-23 NOTE — ED PROVIDER NOTE - MEDICAL DECISION MAKING DETAILS
77 yo M with fever and urinary sxs. IV fluids, medications, labs, UA, UV, EKG, XR, CT Scan, observe and reassess. 77 yo M with fever and urinary sxs. IV fluids, medications, labs, UA, UV, EKG, XR, CT Scan, observe and reassess. Consult ID.

## 2018-03-24 DIAGNOSIS — N39.0 URINARY TRACT INFECTION, SITE NOT SPECIFIED: ICD-10-CM

## 2018-03-24 DIAGNOSIS — Z29.9 ENCOUNTER FOR PROPHYLACTIC MEASURES, UNSPECIFIED: ICD-10-CM

## 2018-03-24 DIAGNOSIS — N40.1 BENIGN PROSTATIC HYPERPLASIA WITH LOWER URINARY TRACT SYMPTOMS: ICD-10-CM

## 2018-03-24 DIAGNOSIS — I48.91 UNSPECIFIED ATRIAL FIBRILLATION: ICD-10-CM

## 2018-03-24 DIAGNOSIS — A41.9 SEPSIS, UNSPECIFIED ORGANISM: ICD-10-CM

## 2018-03-24 DIAGNOSIS — E78.5 HYPERLIPIDEMIA, UNSPECIFIED: ICD-10-CM

## 2018-03-24 DIAGNOSIS — I10 ESSENTIAL (PRIMARY) HYPERTENSION: ICD-10-CM

## 2018-03-24 LAB
CULTURE RESULTS: SIGNIFICANT CHANGE UP
SPECIMEN SOURCE: SIGNIFICANT CHANGE UP
T3 SERPL-MCNC: 105 NG/DL — SIGNIFICANT CHANGE UP (ref 80–200)
T4 AB SER-ACNC: 7.2 UG/DL — SIGNIFICANT CHANGE UP (ref 4.6–12)
TSH SERPL-MCNC: 2.88 UIU/ML — SIGNIFICANT CHANGE UP (ref 0.27–4.2)

## 2018-03-24 RX ORDER — IPRATROPIUM/ALBUTEROL SULFATE 18-103MCG
3 AEROSOL WITH ADAPTER (GRAM) INHALATION EVERY 6 HOURS
Qty: 0 | Refills: 0 | Status: DISCONTINUED | OUTPATIENT
Start: 2018-03-24 | End: 2018-03-26

## 2018-03-24 RX ADMIN — IMIPENEM AND CILASTATIN 100 MILLIGRAM(S): 250; 250 INJECTION, POWDER, FOR SOLUTION INTRAVENOUS at 00:08

## 2018-03-24 RX ADMIN — IMIPENEM AND CILASTATIN 100 MILLIGRAM(S): 250; 250 INJECTION, POWDER, FOR SOLUTION INTRAVENOUS at 17:28

## 2018-03-24 RX ADMIN — Medication 1 TABLET(S): at 17:27

## 2018-03-24 RX ADMIN — Medication 3 MILLILITER(S): at 21:57

## 2018-03-24 RX ADMIN — Medication 25 MILLIGRAM(S): at 21:18

## 2018-03-24 RX ADMIN — AMLODIPINE BESYLATE 10 MILLIGRAM(S): 2.5 TABLET ORAL at 06:33

## 2018-03-24 RX ADMIN — IMIPENEM AND CILASTATIN 100 MILLIGRAM(S): 250; 250 INJECTION, POWDER, FOR SOLUTION INTRAVENOUS at 06:34

## 2018-03-24 RX ADMIN — Medication 1 TABLET(S): at 12:21

## 2018-03-24 RX ADMIN — Medication 650 MILLIGRAM(S): at 03:02

## 2018-03-24 RX ADMIN — RIVAROXABAN 20 MILLIGRAM(S): KIT at 17:28

## 2018-03-24 RX ADMIN — Medication 1 TABLET(S): at 07:51

## 2018-03-24 RX ADMIN — Medication 50 MILLIGRAM(S): at 06:33

## 2018-03-24 RX ADMIN — IMIPENEM AND CILASTATIN 100 MILLIGRAM(S): 250; 250 INJECTION, POWDER, FOR SOLUTION INTRAVENOUS at 23:18

## 2018-03-24 RX ADMIN — Medication 3 MILLILITER(S): at 13:44

## 2018-03-24 RX ADMIN — FINASTERIDE 5 MILLIGRAM(S): 5 TABLET, FILM COATED ORAL at 12:21

## 2018-03-24 RX ADMIN — LOSARTAN POTASSIUM 100 MILLIGRAM(S): 100 TABLET, FILM COATED ORAL at 06:33

## 2018-03-24 RX ADMIN — ATORVASTATIN CALCIUM 10 MILLIGRAM(S): 80 TABLET, FILM COATED ORAL at 21:18

## 2018-03-24 RX ADMIN — TAMSULOSIN HYDROCHLORIDE 0.8 MILLIGRAM(S): 0.4 CAPSULE ORAL at 21:18

## 2018-03-24 RX ADMIN — Medication 3 MILLILITER(S): at 07:55

## 2018-03-24 RX ADMIN — IMIPENEM AND CILASTATIN 100 MILLIGRAM(S): 250; 250 INJECTION, POWDER, FOR SOLUTION INTRAVENOUS at 12:21

## 2018-03-24 RX ADMIN — Medication 3 MILLILITER(S): at 00:15

## 2018-03-24 NOTE — PROGRESS NOTE ADULT - SUBJECTIVE AND OBJECTIVE BOX
Patient is a 78y old  Male who presents with a chief complaint of urinary infection (23 Mar 2018 20:31)    HPI:     INTERVAL HPI/OVERNIGHT EVENTS:  Chart reviewed, notes reviewed.   Patient seen and examined.  Being followed by following specialists: ID    18 --> Feeling better. Dysuria is improving. No fever or chills. No abdominal pain. No nausea or vomiting. No dizziness or Syncope.     REVIEW OF SYSTEMS:  CONSTITUTIONAL: + fatigue  EYES: No eye pain,   ENMT:  No sinus or throat pain  NECK: No pain or stiffness  BREASTS: No pain, masses, or nipple discharge  RESPIRATORY: No cough, wheezing, chills or hemoptysis; No shortness of breath  CARDIOVASCULAR: No chest pain, palpitations, dizziness, or leg swelling  GASTROINTESTINAL: No abdominal or epigastric pain. No nausea, vomiting, or hematemesis; No diarrhea or constipation. No melena or hematochezia.  GENITOURINARY: + Dysuria (improving). No hematuria.   NEUROLOGICAL: No headaches, memory loss, loss of strength, numbness, or tremors  SKIN: No itching, burning, rashes, or lesions   LYMPH NODES: No enlarged glands  ENDOCRINE: No heat or cold intolerance; No hair loss; No polydipsia or polyuria  MUSCULOSKELETAL: No joint pain or swelling; No muscle, back, or extremity pain  PSYCHIATRIC: No depression, anxiety, mood swings, or difficulty sleeping  HEME/LYMPH: No easy bruising, or bleeding gums  ALLERGY AND IMMUNOLOGIC: No hives or eczema    Allergies: No Known Allergies    Intolerances    MEDICATIONS  (STANDING):  amLODIPine   Tablet 10 milliGRAM(s) Oral daily  atorvastatin 10 milliGRAM(s) Oral at bedtime  finasteride 5 milliGRAM(s) Oral daily  imipenem/cilastatin  IVPB 500 milliGRAM(s) IV Intermittent every 6 hours  lactobacillus acidophilus 1 Tablet(s) Oral three times a day with meals  losartan 100 milliGRAM(s) Oral daily  metoprolol succinate ER 25 milliGRAM(s) Oral at bedtime  metoprolol succinate ER 50 milliGRAM(s) Oral daily  rivaroxaban 20 milliGRAM(s) Oral every 24 hours  tamsulosin 0.8 milliGRAM(s) Oral at bedtime    MEDICATIONS  (PRN):  acetaminophen   Tablet 650 milliGRAM(s) Oral every 6 hours PRN For Temp greater than 38 C (100.4 F)  ALBUTerol/ipratropium for Nebulization 3 milliLiter(s) Nebulizer every 6 hours PRN Shortness of Breath and/or Wheezing    Vital Signs Last 24 Hrs  T(C): 36.6 (24 Mar 2018 08:14), Max: 38.6 (23 Mar 2018 18:30)  T(F): 97.9 (24 Mar 2018 08:14), Max: 101.4 (23 Mar 2018 18:30)  HR: 60 (24 Mar 2018 13:45) (60 - 92)  BP: 121/71 (24 Mar 2018 08:14) (109/59 - 142/82)  BP(mean): --  RR: 16 (24 Mar 2018 08:14) (15 - 18)  SpO2: 96% (24 Mar 2018 13:45) (93% - 97%)    PHYSICAL EXAM:  GENERAL: NAD, well-groomed, well-developed  HEAD:  Atraumatic, Normocephalic  EYES: EOMI, PERRLA, conjunctiva and sclera clear  ENMT: No tonsillar erythema, exudates, or enlargement; Moist mucous membranes, Good dentition, No lesions  NECK: Supple, No JVD, Normal thyroid  CHEST/LUNG: Clear to auscultation bilaterally; No rales, rhonchi, wheezing, or rubs  HEART: Regular rate and rhythm; No murmurs, rubs, or gallops  ABDOMEN: Soft, Nontender, Nondistended; Bowel sounds present  EXTREMITIES:  2+ Peripheral Pulses, No clubbing, cyanosis, or edema  MS: No joint swelling or deformity.   LYMPH: No lymphadenopathy noted  SKIN: No rashes or lesions  NERVOUS SYSTEM:  Motor Strength 4/5 B/L upper and lower extremities; DTRs 2+ intact and symmetric  PSYCH:  Alert & Oriented X3, Good concentration.    LABS:                       14.7   8.7   )-----------( 196      ( 23 Mar 2018 17:53 )             43.7     23 Mar 2018 17:53    130    |  97     |  20     ----------------------------<  121    3.7     |  28     |  1.16     Ca    8.8        23 Mar 2018 17:53    TPro  8.1    /  Alb  3.3    /  TBili  0.7    /  DBili  x      /  AST  46     /  ALT  55     /  AlkPhos  82     23 Mar 2018 17:53    Thyroid Stimulating Hormone, Serum: 2.88 uIU/mL ( @ 12:21)    Urinalysis Basic - ( 23 Mar 2018 17:53 )    Color: Yellow / Appearance: Clear / S.020 / pH: x  Gluc: x / Ketone: Trace  / Bili: Negative / Urobili: Negative mg/dL   Blood: x / Protein: 100 mg/dL / Nitrite: Negative   Leuk Esterase: Moderate / RBC: 11-25 /HPF / WBC 11-25   Sq Epi: x / Non Sq Epi: Occasional / Bacteria: Few      RADIOLOGY TEST: (IMAGES REVIEWED BY ME)    Imaging Personally Reviewed:  [ ] YES        HEALTH ISSUES - PROBLEM Dx: Patient is a 78y old  Male who presents with a chief complaint of urinary infection (23 Mar 2018 20:31)    HPI: 77 y/o M, with PMHx of BPH, prostatitis secondary to E. coli ESBL in , HTN, HLD, Afib (on xarelto), presenting to the ED with CC of 2-3 days history of urinary frequency dysuria   and fever chills. TookLevaquin x 2 days but no better. Had fever again with Tmax 101.2F and he came to the hospital to be evaluated. Denies back/flank pain. Denies abd pain n/v/diarrhea. In ER pt was febrile. UA positive.  Patient was admitted for further care.       INTERVAL HPI/OVERNIGHT EVENTS:  Chart reviewed, notes reviewed.   Patient seen and examined.  Being followed by following specialists: ID    18 --> Feeling better. Dysuria is improving. No fever or chills. No abdominal pain. No nausea or vomiting. No dizziness or Syncope.     REVIEW OF SYSTEMS:  CONSTITUTIONAL: + fatigue  EYES: No eye pain,   ENMT:  No sinus or throat pain  NECK: No pain or stiffness  BREASTS: No pain, masses, or nipple discharge  RESPIRATORY: No cough, wheezing, chills or hemoptysis; No shortness of breath  CARDIOVASCULAR: No chest pain, palpitations, dizziness, or leg swelling  GASTROINTESTINAL: No abdominal or epigastric pain. No nausea, vomiting, or hematemesis; No diarrhea or constipation. No melena or hematochezia.  GENITOURINARY: + Dysuria (improving). No hematuria.   NEUROLOGICAL: No headaches, memory loss, loss of strength, numbness, or tremors  SKIN: No itching, burning, rashes, or lesions   LYMPH NODES: No enlarged glands  ENDOCRINE: No heat or cold intolerance; No hair loss; No polydipsia or polyuria  MUSCULOSKELETAL: No joint pain or swelling; No muscle, back, or extremity pain  PSYCHIATRIC: No depression, anxiety, mood swings, or difficulty sleeping  HEME/LYMPH: No easy bruising, or bleeding gums  ALLERGY AND IMMUNOLOGIC: No hives or eczema    Allergies: No Known Allergies    Intolerances    MEDICATIONS  (STANDING):  amLODIPine   Tablet 10 milliGRAM(s) Oral daily  atorvastatin 10 milliGRAM(s) Oral at bedtime  finasteride 5 milliGRAM(s) Oral daily  imipenem/cilastatin  IVPB 500 milliGRAM(s) IV Intermittent every 6 hours  lactobacillus acidophilus 1 Tablet(s) Oral three times a day with meals  losartan 100 milliGRAM(s) Oral daily  metoprolol succinate ER 25 milliGRAM(s) Oral at bedtime  metoprolol succinate ER 50 milliGRAM(s) Oral daily  rivaroxaban 20 milliGRAM(s) Oral every 24 hours  tamsulosin 0.8 milliGRAM(s) Oral at bedtime    MEDICATIONS  (PRN):  acetaminophen   Tablet 650 milliGRAM(s) Oral every 6 hours PRN For Temp greater than 38 C (100.4 F)  ALBUTerol/ipratropium for Nebulization 3 milliLiter(s) Nebulizer every 6 hours PRN Shortness of Breath and/or Wheezing    Vital Signs Last 24 Hrs  T(C): 36.6 (24 Mar 2018 08:14), Max: 38.6 (23 Mar 2018 18:30)  T(F): 97.9 (24 Mar 2018 08:14), Max: 101.4 (23 Mar 2018 18:30)  HR: 60 (24 Mar 2018 13:45) (60 - 92)  BP: 121/71 (24 Mar 2018 08:14) (109/59 - 142/82)  BP(mean): --  RR: 16 (24 Mar 2018 08:14) (15 - 18)  SpO2: 96% (24 Mar 2018 13:45) (93% - 97%)    PHYSICAL EXAM:  GENERAL: NAD, well-groomed, well-developed  HEAD:  Atraumatic, Normocephalic  EYES: EOMI, PERRLA, conjunctiva and sclera clear  ENMT: No tonsillar erythema, exudates, or enlargement; Moist mucous membranes, Good dentition, No lesions  NECK: Supple, No JVD, Normal thyroid  CHEST/LUNG: Clear to auscultation bilaterally; No rales, rhonchi, wheezing, or rubs  HEART: Regular rate and rhythm; No murmurs, rubs, or gallops  ABDOMEN: Soft, Nontender, Nondistended; Bowel sounds present  EXTREMITIES:  2+ Peripheral Pulses, No clubbing, cyanosis, or edema  MS: No joint swelling or deformity.   LYMPH: No lymphadenopathy noted  SKIN: No rashes or lesions  NERVOUS SYSTEM:  Motor Strength 4/5 B/L upper and lower extremities; DTRs 2+ intact and symmetric  PSYCH:  Alert & Oriented X3, Good concentration.    LABS:                       14.7   8.7   )-----------( 196      ( 23 Mar 2018 17:53 )             43.7     23 Mar 2018 17:53    130    |  97     |  20     ----------------------------<  121    3.7     |  28     |  1.16     Ca    8.8        23 Mar 2018 17:53    TPro  8.1    /  Alb  3.3    /  TBili  0.7    /  DBili  x      /  AST  46     /  ALT  55     /  AlkPhos  82     23 Mar 2018 17:53    Thyroid Stimulating Hormone, Serum: 2.88 uIU/mL ( @ 12:21)    Urinalysis Basic - ( 23 Mar 2018 17:53 )    Color: Yellow / Appearance: Clear / S.020 / pH: x  Gluc: x / Ketone: Trace  / Bili: Negative / Urobili: Negative mg/dL   Blood: x / Protein: 100 mg/dL / Nitrite: Negative   Leuk Esterase: Moderate / RBC: 11-25 /HPF / WBC 11-25   Sq Epi: x / Non Sq Epi: Occasional / Bacteria: Few      RADIOLOGY TEST: (IMAGES REVIEWED BY ME)    Imaging Personally Reviewed:  [ ] YES        HEALTH ISSUES - PROBLEM Dx:

## 2018-03-24 NOTE — CONSULT NOTE ADULT - SUBJECTIVE AND OBJECTIVE BOX
HPI:  Pt is a 77 y/o M, with PMHx of BPH, prostatitis secondary to E. coli ESBL in 2013, HTN, HLD,  Afib (on xarelto), presenting to the ED with CC of 2-3 days history of urinary frequency dysuria   and fever chills. TookLevaquin x 2 days but no better. Had fever again with Tmax 101.2F and he  came to the hospital to be evaluated. Denies back/flank pain. Denies abd pain n/v/diarrhea. In   ER pt was febrile. UA positive.     Infectious Disease consult was called to evaluate pt and for antibiotic management.      Past Medical & Surgical Hx:  PAST MEDICAL & SURGICAL HISTORY:  Sepsis due to Escherichia coli  Thoracic aortic aneurysm without rupture  HLD (hyperlipidemia)  BPH (Benign Prostatic Hyperplasia)  HTN (hypertension)  AF (atrial fibrillation)  Atrial fibrillation status post cardioversion  S/p tibial fracture      Social History--  EtOH: socially  Tobacco: denies   Drug Use: denies   He is a urologist      FAMILY HISTORY:  Noncontributory    Allergies  No Known Allergies    Home Medications:  amLODIPine 10 mg oral tablet: 1 tab(s) orally once a day (23 Mar 2018 17:13)  Flomax 0.4 mg oral capsule: 1 cap(s) orally once a day (23 Mar 2018 17:13)  Levaquin 750 mg oral tablet: 1 tab(s) orally every 24 hours (23 Mar 2018 20:05)  Lipitor 10 mg oral tablet: 1 tab(s) orally once a day (23 Mar 2018 17:13)  losartan 100 mg oral tablet: 1 tab(s) orally once a day (23 Mar 2018 17:13)  Metoprolol Succinate ER 25 mg oral tablet, extended release: 1 tab(s) orally once a day in PM (23 Mar 2018 20:05)  Metoprolol Succinate ER 50 mg oral tablet, extended release: 1 tab(s) orally once a day (23 Mar 2018 20:05)  Proscar 5 mg oral tablet: 1 tab(s) orally once a day (23 Mar 2018 17:13)  Xarelto 20 mg oral tablet: 1 tab(s) orally once a day (in the evening) (23 Mar 2018 17:13)    Current Inpatient Medications :    ANTIBIOTICS:   imipenem/cilastatin  IVPB 500 milliGRAM(s) IV Intermittent every 6 hours    OTHER RELEVANT MEDICATIONS :  acetaminophen   Tablet 650 milliGRAM(s) Oral every 6 hours PRN  ALBUTerol/ipratropium for Nebulization 3 milliLiter(s) Nebulizer every 6 hours PRN  amLODIPine   Tablet 10 milliGRAM(s) Oral daily  atorvastatin 10 milliGRAM(s) Oral at bedtime  finasteride 5 milliGRAM(s) Oral daily  losartan 100 milliGRAM(s) Oral daily  metoprolol succinate ER 25 milliGRAM(s) Oral at bedtime  metoprolol succinate ER 50 milliGRAM(s) Oral daily  rivaroxaban 20 milliGRAM(s) Oral every 24 hours  tamsulosin 0.8 milliGRAM(s) Oral at bedtime      ROS:  CONSTITUTIONAL:  + fever or chills  EYES:  Negative  blurry vision or double vision  CARDIOVASCULAR:  Negative for chest pain or palpitations  RESPIRATORY:  Negative for cough, wheezing, or SOB   GASTROINTESTINAL:  Negative for nausea, vomiting, diarrhea, constipation, or abdominal pain  GENITOURINARY:  +frequency, urgency , dysuria No hematuria   NEUROLOGIC:  No headache, confusion, dizziness, lightheadedness  All other systems were reviewed and are negative      I&O's Detail    23 Mar 2018 07:01  -  24 Mar 2018 07:00  --------------------------------------------------------  IN:    Sodium Chloride 0.9% IV Bolus: 3000 mL  Total IN: 3000 mL    OUT:  Total OUT: 0 mL    Total NET: 3000 mL      24 Mar 2018 07:01  -  24 Mar 2018 19:13  --------------------------------------------------------  IN:    Oral Fluid: 720 mL  Total IN: 720 mL    OUT:    Voided: 200 mL  Total OUT: 200 mL    Total NET: 520 mL    Physical Exam:  Vital Signs Last 24 Hrs  T(C): 37 (24 Mar 2018 17:49), Max: 38.4 (24 Mar 2018 03:07)  T(F): 98.6 (24 Mar 2018 17:49), Max: 101.1 (24 Mar 2018 03:07)  HR: 62 (24 Mar 2018 17:49) (60 - 90)  BP: 114/69 (24 Mar 2018 17:49) (109/59 - 142/82)  BP(mean): --  RR: 16 (24 Mar 2018 17:49) (15 - 18)  SpO2: 94% (24 Mar 2018 17:49) (93% - 97%)      General:  no acute distress  Eyes: sclera anicteric, pupils equal and reactive to light  ENMT: buccal mucosa moist, pharynx not injected  Neck: supple, trachea midline  Lungs: clear, no wheeze/rhonchi  Cardiovascular: regular rate and rhythm, S1 S2  Abdomen: soft, nontender, no organomegaly present, bowel sounds normal  Neurological:  alert and oriented x3, Cranial Nerves II-XII grossly intact  Skin:no increased ecchymosis/petechiae/purpura  Lymph Nodes: no palpable cervical/supraclavicular lymph nodes enlargements  Extremities: no cyanosis/clubbing/edema    Labs:                         14.7   8.7   )-----------( 196      ( 23 Mar 2018 17:53 )             43.7   03-23    130<L>  |  97  |  20  ----------------------------<  121<H>  3.7   |  28  |  1.16    Ca    8.8      23 Mar 2018 17:53    TPro  8.1  /  Alb  3.3  /  TBili  0.7  /  DBili  x   /  AST  46<H>  /  ALT  55  /  AlkPhos  82  03-23      RECENT CULTURES:  PENDING      RADIOLOGY & ADDITIONAL STUDIES:  EXAM:  XR CHEST PA LAT 2V                            PROCEDURE DATE:  03/23/2018        INTERPRETATION:  Clinical information: Fever and cough    2 views, frontal and lateral.    Comparison study dated 12/3/2013.    No sign of lobar consolidation vascular congestion or effusion. Linear   scarring left lung base unchanged. Scarring left upper lung unchanged.   Calcified granulomas left lower lung versus pleural plaque unchanged.   Right lung normally expanded. Heart size within normal limits. Hilar   regions, mediastinal contours intact. No acute osseous abnormalities.    IMPRESSION:    No acute changes since prior study.    Assessment :   Pt is a 77 y/o M, with PMHx of BPH, prostatitis secondary to E. coli ESBL in 2013, HTN, HLD,  Afib (on xarelto) admitted with acute prostatitis  Still febrile    Plan :   Cont Imipenam  Fu cultures  Trend wbc and temps  Willl need intervention of BPH outpt     Continue with present regime .  Approptiate use of antibiotics and adverse effects reviewed.      I have discussed the above plan of care with patient and wife in detail. They expressed understanding of the treatment plan . Risks, benefits and alternatives discussed in detail. I have asked if they have any questions or concerns and appropriately addressed them to the best of my ability .      > 45 minutes spent in direct patient care reviewing  the notes, lab data/ imaging , discussion with multidisciplinary team. All questions were addressed and answered to the best of my capacity .    Thank you for allowing me to participate in the care of your patient .      No Arizmendi MD  Infectious Disease  [phone]

## 2018-03-25 DIAGNOSIS — D72.819 DECREASED WHITE BLOOD CELL COUNT, UNSPECIFIED: ICD-10-CM

## 2018-03-25 LAB
ALBUMIN SERPL ELPH-MCNC: 3.7 G/DL
ALP BLD-CCNC: 78 U/L
ALT SERPL-CCNC: 42 U/L
ANION GAP SERPL CALC-SCNC: 16 MMOL/L
ANION GAP SERPL CALC-SCNC: 9 MMOL/L — SIGNIFICANT CHANGE UP (ref 5–17)
APPEARANCE: CLEAR
AST SERPL-CCNC: 43 U/L
BACTERIA UR CULT: NORMAL
BACTERIA: NEGATIVE
BASOPHILS # BLD AUTO: 0 K/UL
BASOPHILS # BLD AUTO: 0 K/UL — SIGNIFICANT CHANGE UP (ref 0–0.2)
BASOPHILS NFR BLD AUTO: 0 %
BASOPHILS NFR BLD AUTO: 1.6 % — SIGNIFICANT CHANGE UP (ref 0–2)
BILIRUB SERPL-MCNC: 0.6 MG/DL
BILIRUBIN URINE: NEGATIVE
BLOOD URINE: ABNORMAL
BUN SERPL-MCNC: 18 MG/DL
BUN SERPL-MCNC: 9 MG/DL — SIGNIFICANT CHANGE UP (ref 7–23)
CALCIUM SERPL-MCNC: 8.1 MG/DL — LOW (ref 8.4–10.5)
CALCIUM SERPL-MCNC: 9.1 MG/DL
CHLORIDE SERPL-SCNC: 105 MMOL/L — SIGNIFICANT CHANGE UP (ref 96–108)
CHLORIDE SERPL-SCNC: 97 MMOL/L
CO2 SERPL-SCNC: 22 MMOL/L
CO2 SERPL-SCNC: 24 MMOL/L — SIGNIFICANT CHANGE UP (ref 22–31)
COLOR: YELLOW
CREAT SERPL-MCNC: 0.91 MG/DL — SIGNIFICANT CHANGE UP (ref 0.5–1.3)
CREAT SERPL-MCNC: 1.12 MG/DL
EOSINOPHIL # BLD AUTO: 0 K/UL
EOSINOPHIL # BLD AUTO: 0.2 K/UL — SIGNIFICANT CHANGE UP (ref 0–0.5)
EOSINOPHIL NFR BLD AUTO: 0 %
EOSINOPHIL NFR BLD AUTO: 7.6 % — HIGH (ref 0–6)
GLUCOSE QUALITATIVE U: NEGATIVE MG/DL
GLUCOSE SERPL-MCNC: 109 MG/DL
GLUCOSE SERPL-MCNC: 110 MG/DL — HIGH (ref 70–99)
HCT VFR BLD CALC: 37.2 % — LOW (ref 39–50)
HCT VFR BLD CALC: 39.9 % — SIGNIFICANT CHANGE UP (ref 39–50)
HCT VFR BLD CALC: 40.2 % — SIGNIFICANT CHANGE UP (ref 39–50)
HCT VFR BLD CALC: 41.7 %
HGB BLD-MCNC: 13.1 G/DL — SIGNIFICANT CHANGE UP (ref 13–17)
HGB BLD-MCNC: 13.7 G/DL — SIGNIFICANT CHANGE UP (ref 13–17)
HGB BLD-MCNC: 13.7 G/DL — SIGNIFICANT CHANGE UP (ref 13–17)
HGB BLD-MCNC: 14.6 G/DL
HYALINE CASTS: 0 /LPF
IMM GRANULOCYTES NFR BLD AUTO: 0.2 %
KETONES URINE: NEGATIVE
LEUKOCYTE ESTERASE URINE: NEGATIVE
LYMPHOCYTES # BLD AUTO: 0.54 K/UL
LYMPHOCYTES # BLD AUTO: 0.8 K/UL — LOW (ref 1–3.3)
LYMPHOCYTES # BLD AUTO: 37.2 % — SIGNIFICANT CHANGE UP (ref 13–44)
LYMPHOCYTES NFR BLD AUTO: 5.3 %
MAN DIFF?: NORMAL
MCHC RBC-ENTMCNC: 31.1 PG — SIGNIFICANT CHANGE UP (ref 27–34)
MCHC RBC-ENTMCNC: 31.4 PG — SIGNIFICANT CHANGE UP (ref 27–34)
MCHC RBC-ENTMCNC: 31.5 PG
MCHC RBC-ENTMCNC: 31.9 PG — SIGNIFICANT CHANGE UP (ref 27–34)
MCHC RBC-ENTMCNC: 34.1 GM/DL — SIGNIFICANT CHANGE UP (ref 32–36)
MCHC RBC-ENTMCNC: 34.4 GM/DL — SIGNIFICANT CHANGE UP (ref 32–36)
MCHC RBC-ENTMCNC: 35 GM/DL
MCHC RBC-ENTMCNC: 35.2 GM/DL — SIGNIFICANT CHANGE UP (ref 32–36)
MCV RBC AUTO: 89.9 FL
MCV RBC AUTO: 90.8 FL — SIGNIFICANT CHANGE UP (ref 80–100)
MCV RBC AUTO: 91.1 FL — SIGNIFICANT CHANGE UP (ref 80–100)
MCV RBC AUTO: 91.2 FL — SIGNIFICANT CHANGE UP (ref 80–100)
MICROSCOPIC-UA: NORMAL
MONOCYTES # BLD AUTO: 0.46 K/UL
MONOCYTES # BLD AUTO: 0.5 K/UL — SIGNIFICANT CHANGE UP (ref 0–0.9)
MONOCYTES NFR BLD AUTO: 21 % — HIGH (ref 2–14)
MONOCYTES NFR BLD AUTO: 4.6 %
NEUTROPHILS # BLD AUTO: 0.7 K/UL — LOW (ref 1.8–7.4)
NEUTROPHILS # BLD AUTO: 9.08 K/UL
NEUTROPHILS NFR BLD AUTO: 32.7 % — LOW (ref 43–77)
NEUTROPHILS NFR BLD AUTO: 89.9 %
NITRITE URINE: NEGATIVE
PH URINE: 5.5
PLATELET # BLD AUTO: 168 K/UL — SIGNIFICANT CHANGE UP (ref 150–400)
PLATELET # BLD AUTO: 176 K/UL — SIGNIFICANT CHANGE UP (ref 150–400)
PLATELET # BLD AUTO: 178 K/UL — SIGNIFICANT CHANGE UP (ref 150–400)
PLATELET # BLD AUTO: 199 K/UL
POTASSIUM SERPL-MCNC: 3.8 MMOL/L — SIGNIFICANT CHANGE UP (ref 3.5–5.3)
POTASSIUM SERPL-SCNC: 3.8 MMOL/L — SIGNIFICANT CHANGE UP (ref 3.5–5.3)
POTASSIUM SERPL-SCNC: 4.2 MMOL/L
PROT SERPL-MCNC: 7.7 G/DL
PROTEIN URINE: 30 MG/DL
RBC # BLD: 4.09 M/UL — LOW (ref 4.2–5.8)
RBC # BLD: 4.37 M/UL — SIGNIFICANT CHANGE UP (ref 4.2–5.8)
RBC # BLD: 4.41 M/UL — SIGNIFICANT CHANGE UP (ref 4.2–5.8)
RBC # BLD: 4.64 M/UL
RBC # FLD: 11.9 % — SIGNIFICANT CHANGE UP (ref 10.3–14.5)
RBC # FLD: 12 % — SIGNIFICANT CHANGE UP (ref 10.3–14.5)
RBC # FLD: 12.2 % — SIGNIFICANT CHANGE UP (ref 10.3–14.5)
RBC # FLD: 13.7 %
RED BLOOD CELLS URINE: 3 /HPF
SODIUM SERPL-SCNC: 135 MMOL/L
SODIUM SERPL-SCNC: 138 MMOL/L — SIGNIFICANT CHANGE UP (ref 135–145)
SPECIFIC GRAVITY URINE: 1.02
SQUAMOUS EPITHELIAL CELLS: 1 /HPF
UROBILINOGEN URINE: NEGATIVE MG/DL
WBC # BLD: 2.2 K/UL — LOW (ref 3.8–10.5)
WBC # BLD: 2.2 K/UL — LOW (ref 3.8–10.5)
WBC # BLD: 2.3 K/UL — LOW (ref 3.8–10.5)
WBC # FLD AUTO: 10.1 K/UL
WBC # FLD AUTO: 2.2 K/UL — LOW (ref 3.8–10.5)
WBC # FLD AUTO: 2.2 K/UL — LOW (ref 3.8–10.5)
WBC # FLD AUTO: 2.3 K/UL — LOW (ref 3.8–10.5)
WHITE BLOOD CELLS URINE: 8 /HPF

## 2018-03-25 PROCEDURE — 99222 1ST HOSP IP/OBS MODERATE 55: CPT

## 2018-03-25 RX ORDER — CEFUROXIME AXETIL 250 MG
500 TABLET ORAL
Qty: 0 | Refills: 0 | Status: DISCONTINUED | OUTPATIENT
Start: 2018-03-25 | End: 2018-03-26

## 2018-03-25 RX ADMIN — AMLODIPINE BESYLATE 10 MILLIGRAM(S): 2.5 TABLET ORAL at 12:06

## 2018-03-25 RX ADMIN — ATORVASTATIN CALCIUM 10 MILLIGRAM(S): 80 TABLET, FILM COATED ORAL at 21:22

## 2018-03-25 RX ADMIN — IMIPENEM AND CILASTATIN 100 MILLIGRAM(S): 250; 250 INJECTION, POWDER, FOR SOLUTION INTRAVENOUS at 06:37

## 2018-03-25 RX ADMIN — Medication 1 TABLET(S): at 07:58

## 2018-03-25 RX ADMIN — Medication 1 TABLET(S): at 12:06

## 2018-03-25 RX ADMIN — IMIPENEM AND CILASTATIN 100 MILLIGRAM(S): 250; 250 INJECTION, POWDER, FOR SOLUTION INTRAVENOUS at 17:37

## 2018-03-25 RX ADMIN — Medication 3 MILLILITER(S): at 20:34

## 2018-03-25 RX ADMIN — IMIPENEM AND CILASTATIN 100 MILLIGRAM(S): 250; 250 INJECTION, POWDER, FOR SOLUTION INTRAVENOUS at 12:06

## 2018-03-25 RX ADMIN — Medication 1 TABLET(S): at 17:37

## 2018-03-25 RX ADMIN — Medication 50 MILLIGRAM(S): at 05:37

## 2018-03-25 RX ADMIN — FINASTERIDE 5 MILLIGRAM(S): 5 TABLET, FILM COATED ORAL at 12:06

## 2018-03-25 RX ADMIN — Medication 3 MILLILITER(S): at 10:36

## 2018-03-25 RX ADMIN — Medication 25 MILLIGRAM(S): at 21:23

## 2018-03-25 RX ADMIN — Medication 500 MILLIGRAM(S): at 21:22

## 2018-03-25 RX ADMIN — LOSARTAN POTASSIUM 100 MILLIGRAM(S): 100 TABLET, FILM COATED ORAL at 05:37

## 2018-03-25 RX ADMIN — TAMSULOSIN HYDROCHLORIDE 0.8 MILLIGRAM(S): 0.4 CAPSULE ORAL at 21:21

## 2018-03-25 NOTE — PROGRESS NOTE ADULT - SUBJECTIVE AND OBJECTIVE BOX
CURT REARDON is a 78yMale , patient examined and chart reviewed.    INTERVAL HPI/ OVERNIGHT EVENTS:   Feels well. No urinary symptoms.   Afebrile. WBC today 2.2.    PAST MEDICAL & SURGICAL HISTORY:  UTI (urinary tract infection)  Sepsis due to Escherichia coli  Thoracic aortic aneurysm without rupture  HLD (hyperlipidemia)  BPH (Benign Prostatic Hyperplasia)  HTN (hypertension)  AF (atrial fibrillation)  Atrial fibrillation status post cardioversion  S/p tibial fracture      For details regarding the patient's social history, family history, and other miscellaneous elements, please refer the initial infectious diseases consultation and/or the admitting history and physical examination for this admission.    ROS:  CONSTITUTIONAL:  Negative fever or chills, feels well, good appetite  EYES:  Negative  blurry vision or double vision  CARDIOVASCULAR:  Negative for chest pain or palpitations  RESPIRATORY:  Negative for cough, wheezing, or SOB   GASTROINTESTINAL:  Negative for nausea, vomiting, diarrhea, constipation, or abdominal pain  GENITOURINARY:  Negative frequency, urgency or dysuria  NEUROLOGIC:  No headache, confusion, dizziness, lightheadedness  All other systems were reviewed and are negative     NKDA    Current inpatient medications :    ANTIBIOTICS/RELEVANT:  imipenem/cilastatin  IVPB 500 milliGRAM(s) IV Intermittent every 6 hours  lactobacillus acidophilus 1 Tablet(s) Oral three times a day with meals      acetaminophen   Tablet 650 milliGRAM(s) Oral every 6 hours PRN  ALBUTerol/ipratropium for Nebulization 3 milliLiter(s) Nebulizer every 6 hours PRN  amLODIPine   Tablet 10 milliGRAM(s) Oral daily  atorvastatin 10 milliGRAM(s) Oral at bedtime  finasteride 5 milliGRAM(s) Oral daily  losartan 100 milliGRAM(s) Oral daily  metoprolol succinate ER 25 milliGRAM(s) Oral at bedtime  metoprolol succinate ER 50 milliGRAM(s) Oral daily  rivaroxaban 20 milliGRAM(s) Oral every 24 hours  tamsulosin 0.8 milliGRAM(s) Oral at bedtime      Objective:    03-24 @ 07:01  -  03-25 @ 07:00  --------------------------------------------------------  IN: 920 mL / OUT: 200 mL / NET: 720 mL    03-25 @ 07:01  -  03-25 @ 17:53  --------------------------------------------------------  IN: 250 mL / OUT: 0 mL / NET: 250 mL      T(C): 36.6 (03-25-18 @ 17:08), Max: 37.4 (03-24-18 @ 21:08)  HR: 59 (03-25-18 @ 17:08) (59 - 83)  BP: 126/69 (03-25-18 @ 17:08) (122/68 - 137/77)  RR: 18 (03-25-18 @ 17:08) (18 - 18)  SpO2: 98% (03-25-18 @ 17:08) (92% - 100%)  Wt(kg): --      Physical Exam:  General:  no acute distress  Eyes: sclera anicteric, pupils equal and reactive to light  ENMT: buccal mucosa moist, pharynx not injected  Neck: supple, trachea midline  Lungs: clear, no wheeze/rhonchi  Cardiovascular: regular rate and rhythm, S1 S2  Abdomen: soft, nontender, no organomegaly present, bowel sounds normal  Neurological: alert and oriented x3, Cranial Nerves II-XII grossly intact  Skin: no increased ecchymosis/petechiae/purpura  Lymph Nodes: no palpable cervical/supraclavicular lymph nodes enlargements  Extremities: no cyanosis/clubbing/edema        LABS:                          13.7   2.2   )-----------( 176      ( 25 Mar 2018 17:04 )             39.9       03-25    138  |  105  |  9   ----------------------------<  110<H>  3.8   |  24  |  0.91    Ca    8.1<L>      25 Mar 2018 06:52        MICROBIOLOGY:    Culture - Urine (collected 23 Mar 2018 22:40)  Source: .Urine Clean Catch (Midstream)  Final Report (24 Mar 2018 22:03):    <10,000 CFU/ml    Normal Urogenital cedric present    Culture - Blood (collected 23 Mar 2018 22:38)  Source: .Blood Blood-Peripheral  Preliminary Report (24 Mar 2018 23:01):    No growth to date.    Culture - Blood (collected 23 Mar 2018 22:38)  Source: .Blood Blood-Peripheral  Preliminary Report (24 Mar 2018 23:01):    No growth to date.    Assessment :  Pt is a 79 y/o M, with PMHx of BPH, prostatitis secondary to E. coli ESBL in 2013, HTN, HLD,  Afib (on xarelto) admitted with acute prostatitis  Urine cx negative  Leukopenia ?sec Primaxin  Overall clinicaly doing     Plan :   Dc Imipenam  Start Ceftin 500mg po BID   Trend wbc and temps  Willl need intervention of BPH outpt       Continue with present regiment.  Appropriate use of antibiotics and adverse effects reviewed.      I have discussed the above plan of care with patient and wife in detail. They expressed understanding of the  treatment plan . Risks, benefits and alternatives discussed in detail. I have asked if they have any questions or concerns and appropriately addressed them to the best of my ability .    > 35 minutes were spent in direct patient care reviewing notes, medications ,labs data/ imaging , discussion with multidisciplinary team.    Thank you for allowing me to participate in care of your patient .    No Arizmendi MD  Infectious Disease  [phone]

## 2018-03-25 NOTE — PROGRESS NOTE ADULT - SUBJECTIVE AND OBJECTIVE BOX
Patient is a 78y old  Male who presents with a chief complaint of urinary infection (23 Mar 2018 20:31)    HPI: 79 y/o M, with PMHx of BPH, prostatitis secondary to E. coli ESBL in 2013, HTN, HLD, Afib (on xarelto), presenting to the ED with CC of 2-3 days history of urinary frequency dysuria   and fever chills. TookLevaquin x 2 days but no better. Had fever again with Tmax 101.2F and he came to the hospital to be evaluated. Denies back/flank pain. Denies abd pain n/v/diarrhea. In ER pt was febrile. UA positive.  Patient was admitted for further care.       INTERVAL HPI/OVERNIGHT EVENTS:  Chart reviewed, notes reviewed.   Patient seen and examined.  Being followed by following specialists: ID and cardiology.     03/24/18 --> Feeling better. Dysuria is improving. No fever or chills. No abdominal pain. No nausea or vomiting. No dizziness or Syncope.     03/25/18 --> Feeling better. No further dysuria. No chest pain or pressure. No nausea or vomiting. No cough or expectoration.     REVIEW OF SYSTEMS:  CONSTITUTIONAL: + fatigue  EYES: No eye pain,   ENMT:  No sinus or throat pain  NECK: No pain or stiffness  BREASTS: No pain, masses, or nipple discharge  RESPIRATORY: No cough, wheezing, chills or hemoptysis; No shortness of breath  CARDIOVASCULAR: No chest pain, palpitations, dizziness, or leg swelling  GASTROINTESTINAL: No abdominal or epigastric pain. No nausea, vomiting, or hematemesis; No diarrhea or constipation. No melena or hematochezia.  GENITOURINARY: + Dysuria (improving). No hematuria.   NEUROLOGICAL: No headaches, memory loss, loss of strength, numbness, or tremors  SKIN: No itching, burning, rashes, or lesions   LYMPH NODES: No enlarged glands  ENDOCRINE: No heat or cold intolerance; No hair loss; No polydipsia or polyuria  MUSCULOSKELETAL: No joint pain or swelling; No muscle, back, or extremity pain  PSYCHIATRIC: No depression, anxiety, mood swings, or difficulty sleeping  HEME/LYMPH: No easy bruising, or bleeding gums  ALLERGY AND IMMUNOLOGIC: No hives or eczema    Allergies: No Known Allergies    Intolerances    MEDICATIONS  (STANDING):  amLODIPine   Tablet 10 milliGRAM(s) Oral daily  atorvastatin 10 milliGRAM(s) Oral at bedtime  finasteride 5 milliGRAM(s) Oral daily  imipenem/cilastatin  IVPB 500 milliGRAM(s) IV Intermittent every 6 hours  lactobacillus acidophilus 1 Tablet(s) Oral three times a day with meals  losartan 100 milliGRAM(s) Oral daily  metoprolol succinate ER 25 milliGRAM(s) Oral at bedtime  metoprolol succinate ER 50 milliGRAM(s) Oral daily  rivaroxaban 20 milliGRAM(s) Oral every 24 hours  tamsulosin 0.8 milliGRAM(s) Oral at bedtime    MEDICATIONS  (PRN):  acetaminophen   Tablet 650 milliGRAM(s) Oral every 6 hours PRN For Temp greater than 38 C (100.4 F)  ALBUTerol/ipratropium for Nebulization 3 milliLiter(s) Nebulizer every 6 hours PRN Shortness of Breath and/or Wheezing    Vital Signs Last 24 Hrs  T(C): 36.8 (25 Mar 2018 10:11), Max: 37.4 (24 Mar 2018 21:08)  T(F): 98.2 (25 Mar 2018 10:11), Max: 99.3 (24 Mar 2018 21:08)  HR: 67 (25 Mar 2018 10:59) (60 - 83)  BP: 137/77 (25 Mar 2018 10:11) (114/69 - 137/77)  BP(mean): --  RR: 18 (25 Mar 2018 10:11) (16 - 18)  SpO2: 98% (25 Mar 2018 10:59) (92% - 100%)    PHYSICAL EXAM:  GENERAL: NAD, well-groomed, well-developed  HEAD:  Atraumatic, Normocephalic  EYES: EOMI, PERRLA, conjunctiva and sclera clear  ENMT: No tonsillar erythema, exudates, or enlargement; Moist mucous membranes, Good dentition, No lesions  NECK: Supple, No JVD, Normal thyroid  CHEST/LUNG: Clear to auscultation bilaterally; No rales, rhonchi, wheezing, or rubs  HEART: Regular rate and rhythm; No murmurs, rubs, or gallops  ABDOMEN: Soft, Nontender, Nondistended; Bowel sounds present  EXTREMITIES:  2+ Peripheral Pulses, No clubbing, cyanosis, or edema  MS: No joint swelling or deformity.   LYMPH: No lymphadenopathy noted  SKIN: No rashes or lesions  NERVOUS SYSTEM:  Motor Strength 4/5 B/L upper and lower extremities; DTRs 2+ intact and symmetric  PSYCH:  Alert & Oriented X3, Good concentration.    LABS:                       13.1   2.3   )-----------( 168      ( 25 Mar 2018 06:52 )             37.2     25 Mar 2018 06:52    138    |  105    |  9      ----------------------------<  110    3.8     |  24     |  0.91     Ca    8.1        25 Mar 2018 06:52    TPro  8.1    /  Alb  3.3    /  TBili  0.7    /  DBili  x      /  AST  46     /  ALT  55     /  AlkPhos  82     23 Mar 2018 17:53    Thyroid Stimulating Hormone, Serum: 2.88 uIU/mL (03-24 @ 12:21)    Culture Results:   <10,000 CFU/ml  Normal Urogenital cedric present (03-23 @ 22:40)  Culture Results:   No growth to date. (03-23 @ 22:38)  Culture Results:   No growth to date. (03-23 @ 22:38)      RADIOLOGY TEST: (IMAGES REVIEWED BY ME)    Imaging Personally Reviewed:  [ ] YES        HEALTH ISSUES - PROBLEM Dx:

## 2018-03-25 NOTE — CONSULT NOTE ADULT - SUBJECTIVE AND OBJECTIVE BOX
PCP:    REQUESTING PHYSICIAN:    REASON FOR CONSULT:    CHIEF COMPLAINT:    HPI:  Pt is a 79 y/o M, with PMHx of BPH, urosepsis secondary to E. coli, HTN, HLD, and Afib (on xarelto), presenting to the ED with c/o urinary sxs, onset 2 days ago. Pt reports urinary frequency, urinary urgency, and dysuria. Had fever today with Tmax 101.2F. Denies abd pain. 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 States his last episode of urosepsis was resistant to one unknown abx.    Patient is being admitted for further work up and treatment. (23 Mar 2018 20:31)    He has been diagnosed with acute prostatitis and is receiving antibiotics under the direction of ID consultant.     He has a history of paroxysmal at ria fibrillation and underwent GIORGIO guided cardioversion at [facility] by Dr. Kingston in January 2018. Has been maintained on Xarelto. Has not had any documented recurrences. Denies palpitation, presyncope or syncope. Denies chest pain saini=ith or without exertion, LEYVA, LE edema.     Lexiscan nuclear stress test - 10/14/16 - No ischemia ( + diaphregmatic attenuation). LVEF 69%    GIORGIO 01/22/18 ( prior to cardioversion) - Dilated aorta. Mild MR.Mild AI. Mild LAE. Normal LV size and contractility.     CT chest 08/11/17 - Ascending aorta 5.3 cm ( unchanged from previous study)       PAST MEDICAL & SURGICAL HISTORY:  UTI (urinary tract infection)  Sepsis due to Escherichia coli  Thoracic aortic aneurysm without rupture  HLD (hyperlipidemia)  BPH (Benign Prostatic Hyperplasia)  HTN (hypertension)  AF (atrial fibrillation)  Atrial fibrillation status post cardioversion  S/p tibial fracture      Allergies    No Known Allergies      SOCIAL HISTORY:  Nonsmoker; No ETOH.     FAMILY HISTORY:    No pertinent family history in first degree relatives    Home Medications:   * Patient Currently Takes Medications as of 23-Mar-2018 17:13 documented in Structured Notes  · 	losartan 100 mg oral tablet: Last Dose Taken:  , 1 tab(s) orally once a day  · 	Lipitor 10 mg oral tablet: Last Dose Taken:  , 1 tab(s) orally once a day  · 	Proscar 5 mg oral tablet: Last Dose Taken:  , 1 tab(s) orally once a day  · 	amLODIPine 10 mg oral tablet: Last Dose Taken:  , 1 tab(s) orally once a day  · 	Flomax 0.4 mg oral capsule: Last Dose Taken:  , 1 cap(s) orally once a day  · 	Xarelto 20 mg oral tablet: Last Dose Taken:  , 1 tab(s) orally once a day (in the evening)  · 	Metoprolol Succinate ER 50 mg oral tablet, extended release: Last Dose Taken:  , 1 tab(s) orally once a day  · 	Metoprolol Succinate ER 25 mg oral tablet, extended release: Last Dose Taken:  , 1 tab(s) orally once a day in PM  · 	Levaquin 750 mg oral tablet: Last Dose Taken:  , 1 tab(s) orally every 24 hours      MEDICATIONS:  acetaminophen   Tablet 650 milliGRAM(s) Oral every 6 hours PRN  ALBUTerol/ipratropium for Nebulization 3 milliLiter(s) Nebulizer every 6 hours PRN  amLODIPine   Tablet 10 milliGRAM(s) Oral daily  atorvastatin 10 milliGRAM(s) Oral at bedtime  finasteride 5 milliGRAM(s) Oral daily  imipenem/cilastatin  IVPB 500 milliGRAM(s) IV Intermittent every 6 hours  lactobacillus acidophilus 1 Tablet(s) Oral three times a day with meals  losartan 100 milliGRAM(s) Oral daily  metoprolol succinate ER 25 milliGRAM(s) Oral at bedtime  metoprolol succinate ER 50 milliGRAM(s) Oral daily  rivaroxaban 20 milliGRAM(s) Oral every 24 hours  tamsulosin 0.8 milliGRAM(s) Oral at bedtime      REVIEW OF SYSTEMS:    CONSTITUTIONAL: No weakness, fevers or chills  EYES/ENT: No visual changes;  No vertigo or throat pain   NECK: No pain or stiffness  RESPIRATORY: No cough, wheezing, hemoptysis; No shortness of breath  CARDIOVASCULAR: No chest pain or palpitations  GASTROINTESTINAL: No abdominal or epigastric pain. No nausea, vomiting, or hematemesis; No diarrhea or constipation. No melena or hematochezia.  GENITOURINARY: No dysuria, frequency or hematuria  NEUROLOGICAL: No numbness or weakness  SKIN: No itching, burning, rashes, or lesions   All other review of systems is negative unless indicated above    Vital Signs Last 24 Hrs  T(C): 37.2 (25 Mar 2018 05:09), Max: 37.4 (24 Mar 2018 21:08)  T(F): 98.9 (25 Mar 2018 05:09), Max: 99.3 (24 Mar 2018 21:08)  HR: 69 (25 Mar 2018 05:09) (60 - 83)  BP: 122/74 (25 Mar 2018 05:09) (114/69 - 124/73)  BP(mean): --  RR: 18 (25 Mar 2018 05:09) (16 - 18)  SpO2: 98% (25 Mar 2018 05:09) (92% - 100%)    I&O's Summary    24 Mar 2018 07:01  -  25 Mar 2018 07:00  --------------------------------------------------------  IN: 920 mL / OUT: 200 mL / NET: 720 mL        PHYSICAL EXAM:    Constitutional: NAD, awake and alert, well-developed  HEENT: PERR, EOMI,  No oral cyananosis.  Neck:  supple,  No JVD  Respiratory: Breath sounds are clear bilaterally, No wheezing, rales or rhonchi  Cardiovascular: S1 and S2, regular rate and rhythm, no Murmurs, gallops or rubs  Gastrointestinal: Bowel Sounds present, soft, nontender.   Extremities: No peripheral edema. No clubbing or cyanosis.  Vascular: 2+ peripheral pulses  Neurological: A/O x 3, no focal deficits  Musculoskeletal: no calf tenderness.  Skin: No rashes.      LABS: All Labs Reviewed:                        14.7   8.7   )-----------( 196      ( 23 Mar 2018 17:53 )             43.7     23 Mar 2018 17:53    130    |  97     |  20     ----------------------------<  121    3.7     |  28     |  1.16     Ca    8.8        23 Mar 2018 17:53    TPro  8.1    /  Alb  3.3    /  TBili  0.7    /  DBili  x      /  AST  46     /  ALT  55     /  AlkPhos  82     23 Mar 2018 17:53        03-24 @ 12:21  TSH: 2.88      RADIOLOGY/EKG:    PROCEDURE DATE:  03/23/2018          INTERPRETATION:  Clinical information: Fever and cough    2 views, frontal and lateral.    Comparison study dated 12/3/2013.    No sign of lobar consolidation vascular congestion or effusion. Linear   scarring left lung base unchanged. Scarring left upper lung unchanged.   Calcified granulomas left lower lung versus pleural plaque unchanged.   Right lung normally expanded. Heart size within normal limits. Hilar   regions, mediastinal contours intact. No acute osseous abnormalities.    IMPRESSION:    No acute changes since prior study.        EDILMA WEST M.D.,ATTENDING RADIOLOGIST  This document has been electronically signed. Mar 23 2018  6:02PM    rhythm - sinus    EKG -  03/23/18 - Sinus rhythm 92 BPM. Low voltage QRS ( similar to 02/02/18 in office except sinus ellyn then_ HPI:  Pt is a 79 y/o M, with PMHx of BPH, urosepsis secondary to E. coli, HTN, HLD, and Afib (on xarelto), presenting to the ED with c/o urinary sxs, onset 2 days ago. Pt reports urinary frequency, urinary urgency, and dysuria. Had fever today with Tmax 101.2F. Denies abd pain. 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 States his last episode of urosepsis was resistant to one unknown abx.    Patient is being admitted for further work up and treatment. (23 Mar 2018 20:31)    He has been diagnosed with acute prostatitis and is receiving antibiotics under the direction of ID consultant.     He has a history of paroxysmal at rial fibrillation and underwent GIORGIO guided cardioversion at [facility] by Dr. Kingston in January 2018. Has been maintained on Xarelto. Has not had any documented recurrences. Denies palpitation, presyncope or syncope. Denies chest pain saini=ith or without exertion, LEYVA, LE edema. He exercises regularly without cardiac related sxs.     Lexiscan nuclear stress test - 10/14/16 - No ischemia ( + diaphregmatic attenuation). LVEF 69%    GIORGIO 01/22/18 ( prior to cardioversion) - Dilated aorta. Mild MR.Mild AI. Mild LAE. Normal LV size and contractility.     CT chest 08/11/17 - Ascending aorta 5.3 cm ( unchanged from previous study). He is followed by surgeon regularly for surveillance.       PAST MEDICAL & SURGICAL HISTORY:  UTI (urinary tract infection)  Sepsis due to Escherichia coli  Thoracic aortic aneurysm without rupture  HLD (hyperlipidemia)  BPH (Benign Prostatic Hyperplasia)  HTN (hypertension)  AF (atrial fibrillation)  Atrial fibrillation status post cardioversion  S/p tibial fracture      Allergies    No Known Allergies      SOCIAL HISTORY:  Nonsmoker; No ETOH.     FAMILY HISTORY:    No pertinent family history in first degree relatives    Home Medications:   * Patient Currently Takes Medications as of 23-Mar-2018 17:13 documented in Structured Notes  · 	losartan 100 mg oral tablet: Last Dose Taken:  , 1 tab(s) orally once a day  · 	Lipitor 10 mg oral tablet: Last Dose Taken:  , 1 tab(s) orally once a day  · 	Proscar 5 mg oral tablet: Last Dose Taken:  , 1 tab(s) orally once a day  · 	amLODIPine 10 mg oral tablet: Last Dose Taken:  , 1 tab(s) orally once a day  · 	Flomax 0.4 mg oral capsule: Last Dose Taken:  , 1 cap(s) orally once a day  · 	Xarelto 20 mg oral tablet: Last Dose Taken:  , 1 tab(s) orally once a day (in the evening)  · 	Metoprolol Succinate ER 50 mg oral tablet, extended release: Last Dose Taken:  , 1 tab(s) orally once a day  · 	Metoprolol Succinate ER 25 mg oral tablet, extended release: Last Dose Taken:  , 1 tab(s) orally once a day in PM  · 	Levaquin 750 mg oral tablet: Last Dose Taken:  , 1 tab(s) orally every 24 hours      MEDICATIONS:  acetaminophen   Tablet 650 milliGRAM(s) Oral every 6 hours PRN  ALBUTerol/ipratropium for Nebulization 3 milliLiter(s) Nebulizer every 6 hours PRN  amLODIPine   Tablet 10 milliGRAM(s) Oral daily  atorvastatin 10 milliGRAM(s) Oral at bedtime  finasteride 5 milliGRAM(s) Oral daily  imipenem/cilastatin  IVPB 500 milliGRAM(s) IV Intermittent every 6 hours  lactobacillus acidophilus 1 Tablet(s) Oral three times a day with meals  losartan 100 milliGRAM(s) Oral daily  metoprolol succinate ER 25 milliGRAM(s) Oral at bedtime  metoprolol succinate ER 50 milliGRAM(s) Oral daily  rivaroxaban 20 milliGRAM(s) Oral every 24 hours  tamsulosin 0.8 milliGRAM(s) Oral at bedtime      REVIEW OF SYSTEMS:    CONSTITUTIONAL: No weakness + fever as outpt. + fatigue.  EYES/ENT: No visual changes;  + brief epistaxis.   RESPIRATORY: + mild nonproductive cough. No  wheezing, hemoptysis; No shortness of breath  CARDIOVASCULAR: see HPI  GASTROINTESTINAL: No abdominal or epigastric pain. No nausea, vomiting, or hematemesis; No diarrhea or constipation. No melena or hematochezia.  GENITOURINARY: see HPI.   NEUROLOGICAL: No focal  weakness  SKIN: No itching, burning, rashes, or lesions   All other review of systems is negative unless indicated above    Vital Signs Last 24 Hrs  T(C): 37.2 (25 Mar 2018 05:09), Max: 37.4 (24 Mar 2018 21:08)  T(F): 98.9 (25 Mar 2018 05:09), Max: 99.3 (24 Mar 2018 21:08)  HR: 69 (25 Mar 2018 05:09) (60 - 83)  BP: 122/74 (25 Mar 2018 05:09) (114/69 - 124/73)  BP(mean): --  RR: 18 (25 Mar 2018 05:09) (16 - 18)  SpO2: 98% (25 Mar 2018 05:09) (92% - 100%)    I&O's Summary    24 Mar 2018 07:01  -  25 Mar 2018 07:00  --------------------------------------------------------  IN: 920 mL / OUT: 200 mL / NET: 720 mL        PHYSICAL EXAM:    Constitutional: NAD, awake and alert, well-developed  HEENT: NC/AT. Anicteric. No oral cyanosis.   Neck:  supple,  No JVD  Respiratory: Breath sounds are clear bilaterally, No wheezing, rales or rhonchi  Cardiovascular: S1 and S2, regular rhythm, no Murmurs  Gastrointestinal: Bowel Sounds present, soft, nontender.   Extremities: No peripheral edema. No clubbing or cyanosis.  Neurological: A/O x 3.   Musculoskeletal: no calf tenderness.  Skin: No rashes.      LABS: All Labs Reviewed:                        14.7   8.7   )-----------( 196      ( 23 Mar 2018 17:53 )             43.7     23 Mar 2018 17:53    130    |  97     |  20     ----------------------------<  121    3.7     |  28     |  1.16     Ca    8.8        23 Mar 2018 17:53    TPro  8.1    /  Alb  3.3    /  TBili  0.7    /  DBili  x      /  AST  46     /  ALT  55     /  AlkPhos  82     23 Mar 2018 17:53        03-24 @ 12:21  TSH: 2.88      RADIOLOGY/EKG:    PROCEDURE DATE:  03/23/2018          INTERPRETATION:  Clinical information: Fever and cough    2 views, frontal and lateral.    Comparison study dated 12/3/2013.    No sign of lobar consolidation vascular congestion or effusion. Linear   scarring left lung base unchanged. Scarring left upper lung unchanged.   Calcified granulomas left lower lung versus pleural plaque unchanged.   Right lung normally expanded. Heart size within normal limits. Hilar   regions, mediastinal contours intact. No acute osseous abnormalities.    IMPRESSION:    No acute changes since prior study.        EDILMA WEST M.D.,ATTENDING RADIOLOGIST  This document has been electronically signed. Mar 23 2018  6:02PM    rhythm - not on telemetry.    EKG -  03/23/18 - Sinus rhythm 92 BPM. Low voltage QRS ( similar to 02/02/18 in office except sinus ellyn then_

## 2018-03-26 ENCOUNTER — TRANSCRIPTION ENCOUNTER (OUTPATIENT)
Age: 78
End: 2018-03-26

## 2018-03-26 VITALS
OXYGEN SATURATION: 98 % | RESPIRATION RATE: 16 BRPM | DIASTOLIC BLOOD PRESSURE: 81 MMHG | SYSTOLIC BLOOD PRESSURE: 129 MMHG | TEMPERATURE: 98 F | HEART RATE: 87 BPM

## 2018-03-26 LAB
EOSINOPHIL NFR BLD AUTO: 14 % — HIGH (ref 0–6)
GIANT PLATELETS BLD QL SMEAR: PRESENT — SIGNIFICANT CHANGE UP
HCT VFR BLD CALC: 41.9 % — SIGNIFICANT CHANGE UP (ref 39–50)
HGB BLD-MCNC: 14.3 G/DL — SIGNIFICANT CHANGE UP (ref 13–17)
LG PLATELETS BLD QL AUTO: SLIGHT — SIGNIFICANT CHANGE UP
LYMPHOCYTES # BLD AUTO: 38 % — SIGNIFICANT CHANGE UP (ref 13–44)
MCHC RBC-ENTMCNC: 30.9 PG — SIGNIFICANT CHANGE UP (ref 27–34)
MCHC RBC-ENTMCNC: 34.1 GM/DL — SIGNIFICANT CHANGE UP (ref 32–36)
MCV RBC AUTO: 90.7 FL — SIGNIFICANT CHANGE UP (ref 80–100)
MONOCYTES NFR BLD AUTO: 14 % — SIGNIFICANT CHANGE UP (ref 2–14)
NEUTROPHILS NFR BLD AUTO: 30 % — LOW (ref 43–77)
PLAT MORPH BLD: NORMAL — SIGNIFICANT CHANGE UP
PLATELET # BLD AUTO: 202 K/UL — SIGNIFICANT CHANGE UP (ref 150–400)
RBC # BLD: 4.62 M/UL — SIGNIFICANT CHANGE UP (ref 4.2–5.8)
RBC # FLD: 11.9 % — SIGNIFICANT CHANGE UP (ref 10.3–14.5)
RBC BLD AUTO: NORMAL — SIGNIFICANT CHANGE UP
VARIANT LYMPHS # BLD: 4 % — SIGNIFICANT CHANGE UP (ref 0–6)
WBC # BLD: 2.6 K/UL — LOW (ref 3.8–10.5)
WBC # FLD AUTO: 2.6 K/UL — LOW (ref 3.8–10.5)

## 2018-03-26 PROCEDURE — 87040 BLOOD CULTURE FOR BACTERIA: CPT

## 2018-03-26 PROCEDURE — 87086 URINE CULTURE/COLONY COUNT: CPT

## 2018-03-26 PROCEDURE — 87798 DETECT AGENT NOS DNA AMP: CPT

## 2018-03-26 PROCEDURE — 87633 RESP VIRUS 12-25 TARGETS: CPT

## 2018-03-26 PROCEDURE — 94760 N-INVAS EAR/PLS OXIMETRY 1: CPT

## 2018-03-26 PROCEDURE — 80053 COMPREHEN METABOLIC PANEL: CPT

## 2018-03-26 PROCEDURE — 99285 EMERGENCY DEPT VISIT HI MDM: CPT

## 2018-03-26 PROCEDURE — 81001 URINALYSIS AUTO W/SCOPE: CPT

## 2018-03-26 PROCEDURE — 93005 ELECTROCARDIOGRAM TRACING: CPT

## 2018-03-26 PROCEDURE — 36415 COLL VENOUS BLD VENIPUNCTURE: CPT

## 2018-03-26 PROCEDURE — 71046 X-RAY EXAM CHEST 2 VIEWS: CPT

## 2018-03-26 PROCEDURE — 83605 ASSAY OF LACTIC ACID: CPT

## 2018-03-26 PROCEDURE — 96365 THER/PROPH/DIAG IV INF INIT: CPT

## 2018-03-26 PROCEDURE — 87486 CHLMYD PNEUM DNA AMP PROBE: CPT

## 2018-03-26 PROCEDURE — 94664 DEMO&/EVAL PT USE INHALER: CPT

## 2018-03-26 PROCEDURE — 96361 HYDRATE IV INFUSION ADD-ON: CPT

## 2018-03-26 PROCEDURE — 85027 COMPLETE CBC AUTOMATED: CPT

## 2018-03-26 PROCEDURE — 94640 AIRWAY INHALATION TREATMENT: CPT

## 2018-03-26 PROCEDURE — 87581 M.PNEUMON DNA AMP PROBE: CPT

## 2018-03-26 PROCEDURE — 84443 ASSAY THYROID STIM HORMONE: CPT

## 2018-03-26 PROCEDURE — 84436 ASSAY OF TOTAL THYROXINE: CPT

## 2018-03-26 PROCEDURE — 84480 ASSAY TRIIODOTHYRONINE (T3): CPT

## 2018-03-26 PROCEDURE — 80048 BASIC METABOLIC PNL TOTAL CA: CPT

## 2018-03-26 RX ORDER — CEFUROXIME AXETIL 250 MG
1 TABLET ORAL
Qty: 20 | Refills: 0 | OUTPATIENT
Start: 2018-03-26 | End: 2018-04-04

## 2018-03-26 RX ORDER — LACTOBACILLUS ACIDOPHILUS 100MM CELL
1 CAPSULE ORAL
Qty: 0 | Refills: 0 | COMMUNITY
Start: 2018-03-26

## 2018-03-26 RX ORDER — ACETAMINOPHEN 500 MG
2 TABLET ORAL
Qty: 0 | Refills: 0 | COMMUNITY
Start: 2018-03-26

## 2018-03-26 RX ADMIN — AMLODIPINE BESYLATE 10 MILLIGRAM(S): 2.5 TABLET ORAL at 09:05

## 2018-03-26 RX ADMIN — Medication 1 TABLET(S): at 12:56

## 2018-03-26 RX ADMIN — Medication 500 MILLIGRAM(S): at 09:05

## 2018-03-26 RX ADMIN — Medication 50 MILLIGRAM(S): at 05:26

## 2018-03-26 RX ADMIN — LOSARTAN POTASSIUM 100 MILLIGRAM(S): 100 TABLET, FILM COATED ORAL at 05:26

## 2018-03-26 RX ADMIN — Medication 1 TABLET(S): at 09:05

## 2018-03-26 NOTE — PROGRESS NOTE ADULT - SUBJECTIVE AND OBJECTIVE BOX
CURT REARDON is a 78yMale , patient examined and chart reviewed. Patient seen and examined today being followed for febrile syndrome secondary to acute prostatitis, recently returned from [place] 3 weeks ago.        INTERVAL HPI/ OVERNIGHT EVENTS: Events noted, feels much better, been afebrile . Denies any skin rash, improved  symptoms .  Being seen by hematology . Sepsis work up is negative. Patient was on Xarelto wants to be off .    PAST MEDICAL & SURGICAL HISTORY:  UTI (urinary tract infection)  Sepsis due to Escherichia coli  Thoracic aortic aneurysm without rupture  HLD (hyperlipidemia)  BPH (Benign Prostatic Hyperplasia)  HTN (hypertension)  AF (atrial fibrillation)  Atrial fibrillation status post cardioversion  S/p tibial fracture      For details regarding the patient's social history, family history, and other miscellaneous elements, please refer the initial infectious diseases consultation and/or the admitting history and physical examination for this admission.      ROS:  CONSTITUTIONAL:  Negative fever or chills, feels well, good appetite  EYES:  Negative  blurry vision or double vision  CARDIOVASCULAR:  Negative for chest pain or palpitations  RESPIRATORY:  Negative for cough, wheezing, or SOB   GASTROINTESTINAL:  Negative for nausea, vomiting, diarrhea, constipation, or abdominal pain  GENITOURINARY:  Negative frequency, urgency or dysuria  NEUROLOGIC:  No headache, confusion, dizziness, lightheadedness  All other systems were reviewed and are negative     Allergies:      Current inpatient medications :    ANTIBIOTICS/RELEVANT:  cefuroxime   Tablet 500 milliGRAM(s) Oral <User Schedule>  lactobacillus acidophilus 1 Tablet(s) Oral three times a day with meals      acetaminophen   Tablet 650 milliGRAM(s) Oral every 6 hours PRN  ALBUTerol/ipratropium for Nebulization 3 milliLiter(s) Nebulizer every 6 hours PRN  amLODIPine   Tablet 10 milliGRAM(s) Oral daily  atorvastatin 10 milliGRAM(s) Oral at bedtime  finasteride 5 milliGRAM(s) Oral daily  losartan 100 milliGRAM(s) Oral daily  metoprolol succinate ER 25 milliGRAM(s) Oral at bedtime  metoprolol succinate ER 50 milliGRAM(s) Oral daily  rivaroxaban 20 milliGRAM(s) Oral every 24 hours  tamsulosin 0.8 milliGRAM(s) Oral at bedtime      Objective:    03-25 @ 07:01  -  03-26 @ 07:00  --------------------------------------------------------  IN: 250 mL / OUT: 0 mL / NET: 250 mL      T(C): 36.7 (03-26-18 @ 09:02), Max: 36.8 (03-25-18 @ 10:11)  HR: 87 (03-26-18 @ 09:02) (59 - 87)  BP: 129/81 (03-26-18 @ 09:02) (125/71 - 137/77)  RR: 16 (03-26-18 @ 09:02) (16 - 18)  SpO2: 98% (03-26-18 @ 09:02) (94% - 98%)  Wt(kg): --      Physical Exam:  General: well developed well nourished, in no acute distress  Eyes: sclera anicteric, pupils equal and reactive to light  ENMT: buccal mucosa moist, pharynx not injected  Neck: supple, trachea midline  Lungs: clear, no wheeze/rhonchi  Cardiovascular: regular rate and rhythm, S1 S2  Abdomen: soft, nontender, no organomegaly present, bowel sounds normal  Neurological: alert and oriented x3, Cranial Nerves II-XII grossly intact  Skin: no increased ecchymosis/petechiae/purpura  Lymph Nodes: no palpable cervical/supraclavicular lymph nodes enlargements  Extremities: no cyanosis/clubbing/edema            LABS:                          14.3   2.6   )-----------( 202      ( 26 Mar 2018 06:55 )             41.9       03-25    138  |  105  |  9   ----------------------------<  110<H>  3.8   |  24  |  0.91    Ca    8.1<L>      25 Mar 2018 06:52    Lactate, Blood (03.23.18 @ 17:53)    Lactate, Blood: 2.0 mmol/L      RECENT CULTURES:    Culture - Urine (collected 23 Mar 2018 22:40)  Source: .Urine Clean Catch (Midstream)  Final Report (24 Mar 2018 22:03):    <10,000 CFU/ml    Normal Urogenital cedric present    Culture - Blood (collected 23 Mar 2018 22:38)  Source: .Blood Blood-Peripheral  Preliminary Report (24 Mar 2018 23:01):    No growth to date.    Culture - Blood (collected 23 Mar 2018 22:38)  Source: .Blood Blood-Peripheral  Preliminary Report (24 Mar 2018 23:01):    No growth to date.      Rapid Respiratory Viral Panel (03.23.18 @ 17:53)    Rapid RVP Result: NotDetec:      RADIOLOGY & ADDITIONAL STUDIES:  Xray Chest 2 Views PA/Lat (03.23.18 @ 18:05) >    IMPRESSION:    No acute changes since prior study.      Assessment :  Pt is a 77 y/o M, with PMHx of BPH, prostatitis secondary to E. coli ESBL in 2013, HTN, HLD,  Afib (on xarelto) admitted with acute prostatitis  Urine cx negative as he was on PO Levaquin x 2 days PTA  Leukopenia ?sec Primaxin ? recent viral infection improving   Overall clinically doing       Plan :   - continue with PO Ceftin x 10 days   - repeat CBC in 72 hours , if no improvement then needs to stop all antibiotics and consider hematology follow up as out patient and consider Bone Marrow biopsy \  - stable for dc from ID point   - cbc with manual differential count  q 3 days till normal.  - Willl need intervention of BPH outpt       Continue with present regiment.  Appropriate use of antibiotics and adverse effects reviewed.      I have discussed the above plan of care with patient in detail. He expressed understanding of the  treatment plan . Risks, benefits and alternatives discussed in detail. I have asked if he has  any questions or concerns and appropriately addressed them to the best of my ability .    > 35 minutes were spent in direct patient care reviewing notes, medications ,labs data/ imaging , discussion with multidisciplinary team.    Thank you for allowing me to participate in care of your patient .    Stefanie Parish MD  [phone]

## 2018-03-26 NOTE — DISCHARGE NOTE ADULT - MEDICATION SUMMARY - MEDICATIONS TO TAKE
I will START or STAY ON the medications listed below when I get home from the hospital:    Proscar 5 mg oral tablet  -- 1 tab(s) by mouth once a day  -- Indication: For BPH    acetaminophen 325 mg oral tablet  -- 2 tab(s) by mouth every 6 hours, As needed, For Temp greater than 38 C (100.4 F)  -- Indication: For Pain/Fever    losartan 100 mg oral tablet  -- 1 tab(s) by mouth once a day  -- Indication: For Essential hypertension    Flomax 0.4 mg oral capsule  -- 1 cap(s) by mouth once a day  -- Indication: For Benign prostatic hyperplasia with urinary frequency    Xarelto 20 mg oral tablet  -- 1 tab(s) by mouth once a day (in the evening)  -- Indication: For Atrial fibrillation, unspecified type    Lipitor 10 mg oral tablet  -- 1 tab(s) by mouth once a day  -- Indication: For Hyperlipidemia, unspecified hyperlipidemia type    Metoprolol Succinate ER 50 mg oral tablet, extended release  -- 1 tab(s) by mouth once a day  -- Indication: For Essential hypertension    Metoprolol Succinate ER 25 mg oral tablet, extended release  -- 1 tab(s) by mouth once a day in PM  -- Indication: For Essential hypertension    amLODIPine 10 mg oral tablet  -- 1 tab(s) by mouth once a day  -- Indication: For Essential hypertension    cefuroxime 500 mg oral tablet  -- 1 tab(s) by mouth 2 times a day   -- Indication: For Prostatitis    lactobacillus acidophilus oral capsule  -- 1 tab(s) by mouth 3 times a day for 2-3 weeks  -- Indication: For Supplement

## 2018-03-26 NOTE — PROGRESS NOTE ADULT - ASSESSMENT
77 y/o M, with PMHx of BPH, prostatitis secondary to E. coli ESBL in 2013, HTN, HLD, Afib (on xarelto), presenting to the ED with CC of 2-3 days history of urinary frequency dysuria   and fever chills. Took Levaquin x 2 days but no better. Had fever again with Tmax 101.2F and he came to the hospital to be evaluated. Denies back/flank pain. Denies abd pain n/v/diarrhea. In ER pt was febrile. UA positive.  Patient was admitted for further care.
77 y/o M, with PMHx of BPH, prostatitis secondary to E. coli ESBL in 2013, HTN, HLD, Afib (on xarelto), presenting to the ED with CC of 2-3 days history of urinary frequency dysuria   and fever chills. Took Levaquin x 2 days but no better. Had fever again with Tmax 101.2F and he came to the hospital to be evaluated. Denies back/flank pain. Denies abd pain n/v/diarrhea. In ER pt was febrile. UA positive.  Patient was admitted for further care.
79 y/o M, with PMHx of BPH, prostatitis secondary to E. coli ESBL in 2013, HTN, HLD, Afib (on xarelto), presenting to the ED with CC of 2-3 days history of urinary frequency dysuria   and fever chills. Took Levaquin x 2 days but no better. Had fever again with Tmax 101.2F and he came to the hospital to be evaluated. Denies back/flank pain. Denies abd pain n/v/diarrhea. In ER pt was febrile. UA positive.  Patient was admitted for further care.

## 2018-03-26 NOTE — PROGRESS NOTE ADULT - PROBLEM SELECTOR PLAN 6
Continue Metoprolol, Norvasc and Cozaar with holding parameters.   Monitor blood pressure per protocol.
Continue Metoprolol, Norvasc and Cozaar with holding parameters.   Monitor blood pressure per protocol.
Continue Lipitor.

## 2018-03-26 NOTE — DISCHARGE NOTE ADULT - CARE PROVIDER_API CALL
Blane Lopez), Cardiovascular Disease; Internal Medicine  [address] [place] [address]  Phone: [phone]  Fax: [phone]

## 2018-03-26 NOTE — PROGRESS NOTE ADULT - PROBLEM SELECTOR PLAN 4
Continue patient on Metoprolol for rate control.   Continue Xarelto for anticoagulation.
Continue patient on Metoprolol for rate control.   Continue Xarelto for anticoagulation.
Continue Proscar and Flomax for BPH.  Urology follow up as out patient.

## 2018-03-26 NOTE — CONSULT NOTE ADULT - ASSESSMENT
79 yo male with BPH and admission for prostatitis/UTI noted to have acute leukopenia; ANC still adequate  - suspect leukopenia due to acute infection and possibly exacerbated by primaxin  - stable to change to oral antibiotics as per ID  - advised to have outpatient follow-up and repeat CBC within 1 week; if no resolution or evidence of other cytopenias, would recommend bone marrow aspirate/biopsy as outpatient
Pt is a 77 y/o M, with PMHx of BPH, urosepsis secondary to E. coli, HTN, HLD, and Afib (on xarelto), presenting to the ED with c/o urinary sxs, onset 2 days ago. Pt reports urinary frequency, urinary urgency, and dysuria. Had fever today with Tmax 101.2F. Denies abd pain. 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 States his last episode of urosepsis was resistant to one unknown abx.    Patient is being admitted for further work up and treatment. (23 Mar 2018 20:31)    He has been diagnosed with acute prostatitis and is receiving antibiotics under the direction of ID consultant.     He has a history of paroxysmal at rial fibrillation and underwent GIORGIO guided cardioversion at [facility] by Dr. Kingston in January 2018. Has been maintained on Xarelto. Has not had any documented recurrences. Denies palpitation, presyncope or syncope. Denies chest pain saini=ith or without exertion, LEYVA, LE edema. He exercises regularly without cardiac related sxs.     Lexiscan nuclear stress test - 10/14/16 - No ischemia ( + diaphregmatic attenuation). LVEF 69%    GIORGIO 01/22/18 ( prior to cardioversion) - Dilated aorta. Mild MR.Mild AI. Mild LAE. Normal LV size and contractility.     CT chest 08/11/17 - Ascending aorta 5.3 cm ( unchanged from previous study). He is followed by surgeon regularly for surveillance.       Assessment:    PAF - with cardioversion 01/2018 by EP at [facility]. No documented recurrences ( pt relates that he knows when he is in atrial fibrillation). He was advised by  to remain on Xarelto for 3 mos post cardioversion. He relates one brief episode of epistaxis.  Advised to complete this regimen unless advised otherwise by EP. He remains on beta blocker.    HTN - presently controlled.    HLD - remains on statin    Thoracic aorta aneurysm - being followed by Dr. Lopez and surgery.    Prostatitis - as per ID    Patient will f/u with Dr. Lopez upon discharge. Stable cardiac status at this time.

## 2018-03-26 NOTE — PROGRESS NOTE ADULT - PROBLEM SELECTOR PROBLEM 4
Atrial fibrillation, unspecified type
Atrial fibrillation, unspecified type
Benign prostatic hyperplasia with urinary frequency

## 2018-03-26 NOTE — PROGRESS NOTE ADULT - PROBLEM SELECTOR PROBLEM 2
R/O Sepsis, due to unspecified organism

## 2018-03-26 NOTE — PROGRESS NOTE ADULT - PROBLEM SELECTOR PLAN 1
Patient with possible recurrent UTI vs prostatitis.   Concern is for resistant organism as patient has history of ESBL in past.   Continue IV antibiotics (Primaxin) and follow culture data.   ID consult noted and appreciated.   Further management as per patient's clinical course.
Patient with possible recurrent UTI vs prostatitis.   Concern is for resistant organism as patient has history of ESBL in past.   Continue IV antibiotics (Primaxin) and follow culture data.   ID consult.   Further management as per patient's clinical course.
Patient with possible recurrent acute prostatitis.   changed to oral antibiotics by ID.   Will discharge home with oral antibiotics.

## 2018-03-26 NOTE — CONSULT NOTE ADULT - SUBJECTIVE AND OBJECTIVE BOX
Patient is a 78y old  Male who presents with a chief complaint of urinary infection/prostatitis (23 Mar 2018 20:31)      HPI:  Pt is a 79 y/o [place] male , with PMHx of BPH, urosepsis secondary to E. coli, HTN, HLD, and Afib (on xarelto), presented on 3/23/18 to the ED with c/o urinary symptoms which started 2 days prior to admission. Pt reported urinary frequency, urinary urgency, and dysuria. Had fever today with Tmax 101.2F at home. 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 He was admitted for further evaluation and started on IV antibiotics with Primaxin for prostatitis. On day #2 of admission, his WBC dropped to <3. 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    FAMILY HISTORY:  No pertinent family history in first degree relatives      MEDICATIONS  (STANDING):  amLODIPine   Tablet 10 milliGRAM(s) Oral daily  atorvastatin 10 milliGRAM(s) Oral at bedtime  cefuroxime   Tablet 500 milliGRAM(s) Oral <User Schedule>  finasteride 5 milliGRAM(s) Oral daily  lactobacillus acidophilus 1 Tablet(s) Oral three times a day with meals  losartan 100 milliGRAM(s) Oral daily  metoprolol succinate ER 25 milliGRAM(s) Oral at bedtime  metoprolol succinate ER 50 milliGRAM(s) Oral daily  rivaroxaban 20 milliGRAM(s) Oral every 24 hours  tamsulosin 0.8 milliGRAM(s) Oral at bedtime    MEDICATIONS  (PRN):  acetaminophen   Tablet 650 milliGRAM(s) Oral every 6 hours PRN For Temp greater than 38 C (100.4 F)  ALBUTerol/ipratropium for Nebulization 3 milliLiter(s) Nebulizer every 6 hours PRN Shortness of Breath and/or Wheezing      Allergies    No Known Allergies        Vital Signs Last 24 Hrs  T(C): 36.7 (26 Mar 2018 09:02), Max: 36.8 (25 Mar 2018 10:11)  T(F): 98.1 (26 Mar 2018 09:02), Max: 98.2 (25 Mar 2018 10:11)  HR: 87 (26 Mar 2018 09:02) (59 - 87)  BP: 129/81 (26 Mar 2018 09:02) (125/71 - 137/77)  RR: 16 (26 Mar 2018 09:02) (16 - 18)  SpO2: 98% (26 Mar 2018 09:02) (94% - 98%)    PHYSICAL EXAM  General: adult in NAD  HEENT: clear oropharynx, anicteric sclera, pink conjunctivae  Neck: supple  CV: normal S1S2 with no murmur rubs or gallops  Lungs: clear to auscultation, no wheezes, no rhales  Abdomen: soft non-tender non-distended, no hepato/splenomegaly  Ext: no clubbing cyanosis or edema  Skin: no rashes and no petichiae  Neuro: alert and oriented X3 no focal deficits      LABS:    CBC Full  -  ( 26 Mar 2018 06:55 )  WBC Count : 2.6 K/uL  Hemoglobin : 14.3 g/dL  Hematocrit : 41.9 %  Platelet Count - Automated : 202 K/uL  Mean Cell Volume : 90.7 fl  Mean Cell Hemoglobin : 30.9 pg  Mean Cell Hemoglobin Concentration : 34.1 gm/dL  Auto Neutrophil % : 30.0 %  Auto Lymphocyte % : 38.0 %  Auto Monocyte % : 14.0 %  Auto Eosinophil % : 14.0 %    WBC Count: 2.6 K/uL (03-26 @ 06:55)  WBC Count: 2.2 K/uL (03-25 @ 17:04)  WBC Count: 2.2 K/uL (03-25 @ 16:05)  WBC Count: 2.3 K/uL (03-25 @ 06:52)  WBC Count: 8.7 K/uL (03-23 @ 17:53)    03-25    138  |  105  |  9   ----------------------------<  110<H>  3.8   |  24  |  0.91    Ca    8.1<L>      25 Mar 2018 06:52      BLOOD SMEAR INTERPRETATION:    * RBC - normocytic, normochromic, no anisiocytosis or poikiolocytosis  * WBC - neutrophils with normal morphology, small mature lymphs and increased atypical activated lymphs, few monocytes noted, no evidence of left shift and no blasts  * Platelets - normal in number and morphology

## 2018-03-26 NOTE — DISCHARGE NOTE ADULT - SECONDARY DIAGNOSIS.
Febrile illness, acute Benign prostatic hyperplasia with urinary frequency Atrial fibrillation, unspecified type Leukopenia, unspecified type Essential hypertension Hyperlipidemia, unspecified hyperlipidemia type

## 2018-03-26 NOTE — DISCHARGE NOTE ADULT - NS AS ACTIVITY OBS
No Heavy lifting/straining/Driving allowed/Walking-Outdoors allowed/Stairs allowed/Bathing allowed/Showering allowed/Walking-Indoors allowed

## 2018-03-26 NOTE — PROGRESS NOTE ADULT - PROBLEM SELECTOR PROBLEM 5
Benign prostatic hyperplasia with urinary frequency
Benign prostatic hyperplasia with urinary frequency
Essential hypertension

## 2018-03-26 NOTE — PROGRESS NOTE ADULT - SUBJECTIVE AND OBJECTIVE BOX
Patient is a 78y old  Male who presents with a chief complaint of urinary infection (23 Mar 2018 20:31)    HPI: 79 y/o M, with PMHx of BPH, prostatitis secondary to E. coli ESBL in 2013, HTN, HLD, Afib (on xarelto), presenting to the ED with CC of 2-3 days history of urinary frequency dysuria   and fever chills. TookLevaquin x 2 days but no better. Had fever again with Tmax 101.2F and he came to the hospital to be evaluated. Denies back/flank pain. Denies abd pain n/v/diarrhea. In ER pt was febrile. UA positive.  Patient was admitted for further care.       INTERVAL HPI/OVERNIGHT EVENTS:  Chart reviewed, notes reviewed.   Patient seen and examined.  Being followed by following specialists: ID and cardiology.     03/24/18 --> Feeling better. Dysuria is improving. No fever or chills. No abdominal pain. No nausea or vomiting. No dizziness or Syncope.     03/25/18 --> Feeling better. No further dysuria. No chest pain or pressure. No nausea or vomiting. No cough or expectoration.     REVIEW OF SYSTEMS:  CONSTITUTIONAL: + fatigue  EYES: No eye pain,   ENMT:  No sinus or throat pain  NECK: No pain or stiffness  BREASTS: No pain, masses, or nipple discharge  RESPIRATORY: No cough, wheezing, chills or hemoptysis; No shortness of breath  CARDIOVASCULAR: No chest pain, palpitations, dizziness, or leg swelling  GASTROINTESTINAL: No abdominal or epigastric pain. No nausea, vomiting, or hematemesis; No diarrhea or constipation. No melena or hematochezia.  GENITOURINARY: + Dysuria (improving). No hematuria.   NEUROLOGICAL: No headaches, memory loss, loss of strength, numbness, or tremors  SKIN: No itching, burning, rashes, or lesions   LYMPH NODES: No enlarged glands  ENDOCRINE: No heat or cold intolerance; No hair loss; No polydipsia or polyuria  MUSCULOSKELETAL: No joint pain or swelling; No muscle, back, or extremity pain  PSYCHIATRIC: No depression, anxiety, mood swings, or difficulty sleeping  HEME/LYMPH: No easy bruising, or bleeding gums  ALLERGY AND IMMUNOLOGIC: No hives or eczema    Allergies: No Known Allergies    Intolerances    MEDICATIONS  (STANDING):  amLODIPine   Tablet 10 milliGRAM(s) Oral daily  atorvastatin 10 milliGRAM(s) Oral at bedtime  finasteride 5 milliGRAM(s) Oral daily  imipenem/cilastatin  IVPB 500 milliGRAM(s) IV Intermittent every 6 hours  lactobacillus acidophilus 1 Tablet(s) Oral three times a day with meals  losartan 100 milliGRAM(s) Oral daily  metoprolol succinate ER 25 milliGRAM(s) Oral at bedtime  metoprolol succinate ER 50 milliGRAM(s) Oral daily  rivaroxaban 20 milliGRAM(s) Oral every 24 hours  tamsulosin 0.8 milliGRAM(s) Oral at bedtime    MEDICATIONS  (PRN):  acetaminophen   Tablet 650 milliGRAM(s) Oral every 6 hours PRN For Temp greater than 38 C (100.4 F)  ALBUTerol/ipratropium for Nebulization 3 milliLiter(s) Nebulizer every 6 hours PRN Shortness of Breath and/or Wheezing    Vital Signs Last 24 Hrs  T(C): 36.7 (26 Mar 2018 09:02), Max: 36.7 (25 Mar 2018 21:08)  T(F): 98.1 (26 Mar 2018 09:02), Max: 98.1 (25 Mar 2018 21:08)  HR: 87 (26 Mar 2018 09:02) (59 - 87)  BP: 129/81 (26 Mar 2018 09:02) (125/71 - 129/81)  BP(mean): --  RR: 16 (26 Mar 2018 09:02) (16 - 18)  SpO2: 98% (26 Mar 2018 09:02) (94% - 98%)    PHYSICAL EXAM:  GENERAL: NAD, well-groomed, well-developed  HEAD:  Atraumatic, Normocephalic  EYES: EOMI, PERRLA, conjunctiva and sclera clear  ENMT: No tonsillar erythema, exudates, or enlargement; Moist mucous membranes, Good dentition, No lesions  NECK: Supple, No JVD, Normal thyroid  CHEST/LUNG: Clear to auscultation bilaterally; No rales, rhonchi, wheezing, or rubs  HEART: Regular rate and rhythm; No murmurs, rubs, or gallops  ABDOMEN: Soft, Nontender, Nondistended; Bowel sounds present  EXTREMITIES:  2+ Peripheral Pulses, No clubbing, cyanosis, or edema  MS: No joint swelling or deformity.   LYMPH: No lymphadenopathy noted  SKIN: No rashes or lesions  NERVOUS SYSTEM:  Motor Strength 4/5 B/L upper and lower extremities; DTRs 2+ intact and symmetric  PSYCH:  Alert & Oriented X3, Good concentration.    LABS:                                           14.3   2.6   )-----------( 202      ( 26 Mar 2018 06:55 )             41.9     25 Mar 2018 06:52    138    |  105    |  9      ----------------------------<  110    3.8     |  24     |  0.91     Ca    8.1        25 Mar 2018 06:52      Thyroid Stimulating Hormone, Serum: 2.88 uIU/mL (03-24 @ 12:21)    Culture Results:   <10,000 CFU/ml  Normal Urogenital cedric present (03-23 @ 22:40)  Culture Results:   No growth to date. (03-23 @ 22:38)  Culture Results:   No growth to date. (03-23 @ 22:38)          RADIOLOGY TEST: (IMAGES REVIEWED BY ME)    Imaging Personally Reviewed:  [ ] YES        HEALTH ISSUES - PROBLEM Dx:

## 2018-03-26 NOTE — DISCHARGE NOTE ADULT - CARE PLAN
Principal Discharge DX:	Acute prostatitis without hematuria  Goal:	Resolution of infection  Assessment and plan of treatment:	Low salt, low cholesterol diet.  Increase activity as tolerated.  Keep yourself well hydrated.  Follow up with Dr. Lopez in 3 days for repeat CBC with Diff.  Secondary Diagnosis:	Febrile illness, acute  Secondary Diagnosis:	Benign prostatic hyperplasia with urinary frequency  Secondary Diagnosis:	Atrial fibrillation, unspecified type  Secondary Diagnosis:	Leukopenia, unspecified type  Secondary Diagnosis:	Essential hypertension  Secondary Diagnosis:	Hyperlipidemia, unspecified hyperlipidemia type

## 2018-03-26 NOTE — DISCHARGE NOTE ADULT - PATIENT PORTAL LINK FT
You can access the [facility] Patient Portal, offered by [facility], by registering with the following website: [email or web address]

## 2018-03-26 NOTE — DISCHARGE NOTE ADULT - PLAN OF CARE
Resolution of infection Low salt, low cholesterol diet.  Increase activity as tolerated.  Keep yourself well hydrated.  Follow up with Dr. Lpoez in 3 days for repeat CBC with Diff.

## 2018-03-26 NOTE — DISCHARGE NOTE ADULT - HOSPITAL COURSE
78-year-old gentleman with known history of paroxysmal atrial fibrillation, status post cardioversion, hypertension, hyperlipidemia and benign prostatic hypertrophy, who came into the emergency room with fevers and dysuria.    He was admitted with possible UTI.  He was placed on broad-spectrum IV antibiotics given his history of ESBL UTI in the past.  Patient improved.  His urine culture came back negative.  He was ruled out for sepsis.      Patient's febrile illness was most likely secondary to acute prostatitis.  Patient was seen by infectious disease and his antibiotics are being changed to oral antibiotics.    Patient was also noted to have leukopenia.  He was seen by hematology.  He is recommended repeat  CBC as outpatient and if he has persistent leukopenia, he will need outpatient workup.

## 2018-03-26 NOTE — PROGRESS NOTE ADULT - PROBLEM SELECTOR PLAN 2
Patient ruled out for sepsis.
Patient with possible Sepsis due to UTI/Prostatitis POA.  Continue Supportive care and IV antibiotics.   Follow culture data.
Patient with possible Sepsis due to UTI/Prostatitis POA.  Continue Supportive care and IV antibiotics.   Follow culture data.

## 2018-03-26 NOTE — PROGRESS NOTE ADULT - PROBLEM SELECTOR PROBLEM 7
Hyperlipidemia, unspecified hyperlipidemia type
Hyperlipidemia, unspecified hyperlipidemia type
Need for prophylactic measure

## 2018-03-26 NOTE — PROGRESS NOTE ADULT - PROBLEM SELECTOR PLAN 3
Hematology input appreciated.   Will need repeat CBC in 3 days as out patient with diff.
Etiology not clear.  Will repeat CBC this afternoon and in AM.
Continue patient on Metoprolol for rate control.   Continue Xarelto for anticoagulation.

## 2018-03-28 LAB
BASOPHILS # BLD AUTO: 0.02 K/UL
BASOPHILS NFR BLD AUTO: 0.5 %
CULTURE RESULTS: SIGNIFICANT CHANGE UP
CULTURE RESULTS: SIGNIFICANT CHANGE UP
EOSINOPHIL # BLD AUTO: 0.21 K/UL
EOSINOPHIL NFR BLD AUTO: 4.7 %
HCT VFR BLD CALC: 42.2 %
HGB BLD-MCNC: 13.8 G/DL
IMM GRANULOCYTES NFR BLD AUTO: 0.2 %
LYMPHOCYTES # BLD AUTO: 1.58 K/UL
LYMPHOCYTES NFR BLD AUTO: 35.7 %
MAN DIFF?: NORMAL
MCHC RBC-ENTMCNC: 29.9 PG
MCHC RBC-ENTMCNC: 32.7 GM/DL
MCV RBC AUTO: 91.5 FL
MONOCYTES # BLD AUTO: 0.59 K/UL
MONOCYTES NFR BLD AUTO: 13.3 %
NEUTROPHILS # BLD AUTO: 2.02 K/UL
NEUTROPHILS NFR BLD AUTO: 45.6 %
PLATELET # BLD AUTO: 247 K/UL
RBC # BLD: 4.61 M/UL
RBC # FLD: 13.5 %
SPECIMEN SOURCE: SIGNIFICANT CHANGE UP
SPECIMEN SOURCE: SIGNIFICANT CHANGE UP
WBC # FLD AUTO: 4.43 K/UL

## 2018-05-08 ENCOUNTER — APPOINTMENT (OUTPATIENT)
Dept: CARDIOLOGY | Facility: CLINIC | Age: 78
End: 2018-05-08
Payer: MEDICARE

## 2018-05-08 VITALS
WEIGHT: 170 LBS | HEART RATE: 52 BPM | SYSTOLIC BLOOD PRESSURE: 127 MMHG | HEIGHT: 68 IN | BODY MASS INDEX: 25.76 KG/M2 | OXYGEN SATURATION: 97 % | DIASTOLIC BLOOD PRESSURE: 87 MMHG | TEMPERATURE: 97.5 F

## 2018-05-08 DIAGNOSIS — Z87.440 PERSONAL HISTORY OF URINARY (TRACT) INFECTIONS: ICD-10-CM

## 2018-05-08 PROCEDURE — 36415 COLL VENOUS BLD VENIPUNCTURE: CPT

## 2018-05-08 PROCEDURE — 93000 ELECTROCARDIOGRAM COMPLETE: CPT

## 2018-05-08 PROCEDURE — 99215 OFFICE O/P EST HI 40 MIN: CPT | Mod: 25

## 2018-05-09 LAB
ALBUMIN SERPL ELPH-MCNC: 4 G/DL
ALP BLD-CCNC: 63 U/L
ALT SERPL-CCNC: 18 U/L
ANION GAP SERPL CALC-SCNC: 13 MMOL/L
APPEARANCE: CLEAR
AST SERPL-CCNC: 20 U/L
BACTERIA: NEGATIVE
BASOPHILS # BLD AUTO: 0.02 K/UL
BASOPHILS NFR BLD AUTO: 0.5 %
BILIRUB SERPL-MCNC: 0.5 MG/DL
BILIRUBIN URINE: NEGATIVE
BLOOD URINE: NEGATIVE
BUN SERPL-MCNC: 21 MG/DL
CALCIUM SERPL-MCNC: 8.6 MG/DL
CHLORIDE SERPL-SCNC: 105 MMOL/L
CO2 SERPL-SCNC: 24 MMOL/L
COLOR: YELLOW
CREAT SERPL-MCNC: 1.01 MG/DL
EOSINOPHIL # BLD AUTO: 0.11 K/UL
EOSINOPHIL NFR BLD AUTO: 2.8 %
GLUCOSE QUALITATIVE U: NEGATIVE MG/DL
GLUCOSE SERPL-MCNC: 99 MG/DL
HBA1C MFR BLD HPLC: 5.5 %
HCT VFR BLD CALC: 41.8 %
HGB BLD-MCNC: 13.6 G/DL
HYALINE CASTS: 0 /LPF
IMM GRANULOCYTES NFR BLD AUTO: 0 %
KETONES URINE: NEGATIVE
LEUKOCYTE ESTERASE URINE: NEGATIVE
LYMPHOCYTES # BLD AUTO: 1.42 K/UL
LYMPHOCYTES NFR BLD AUTO: 36.7 %
MAN DIFF?: NORMAL
MCHC RBC-ENTMCNC: 30.4 PG
MCHC RBC-ENTMCNC: 32.5 GM/DL
MCV RBC AUTO: 93.3 FL
MICROSCOPIC-UA: NORMAL
MONOCYTES # BLD AUTO: 0.39 K/UL
MONOCYTES NFR BLD AUTO: 10.1 %
NEUTROPHILS # BLD AUTO: 1.93 K/UL
NEUTROPHILS NFR BLD AUTO: 49.9 %
NITRITE URINE: NEGATIVE
PH URINE: 5.5
PLATELET # BLD AUTO: 255 K/UL
POTASSIUM SERPL-SCNC: 5 MMOL/L
PROT SERPL-MCNC: 7.2 G/DL
PROTEIN URINE: NEGATIVE MG/DL
RBC # BLD: 4.48 M/UL
RBC # FLD: 15.1 %
RED BLOOD CELLS URINE: 2 /HPF
SODIUM SERPL-SCNC: 142 MMOL/L
SPECIFIC GRAVITY URINE: 1.03
SQUAMOUS EPITHELIAL CELLS: 0 /HPF
UROBILINOGEN URINE: NEGATIVE MG/DL
WBC # FLD AUTO: 3.87 K/UL
WHITE BLOOD CELLS URINE: 0 /HPF

## 2018-05-12 ENCOUNTER — NON-APPOINTMENT (OUTPATIENT)
Age: 78
End: 2018-05-12

## 2018-05-20 LAB — BACTERIA UR CULT: NORMAL

## 2018-08-26 ENCOUNTER — FORM ENCOUNTER (OUTPATIENT)
Age: 78
End: 2018-08-26

## 2018-08-27 ENCOUNTER — APPOINTMENT (OUTPATIENT)
Dept: CARDIOLOGY | Facility: CLINIC | Age: 78
End: 2018-08-27
Payer: MEDICARE

## 2018-08-27 ENCOUNTER — OUTPATIENT (OUTPATIENT)
Dept: OUTPATIENT SERVICES | Facility: HOSPITAL | Age: 78
LOS: 1 days | End: 2018-08-27
Payer: MEDICARE

## 2018-08-27 ENCOUNTER — APPOINTMENT (OUTPATIENT)
Dept: RADIOLOGY | Facility: CLINIC | Age: 78
End: 2018-08-27
Payer: MEDICARE

## 2018-08-27 VITALS
OXYGEN SATURATION: 97 % | BODY MASS INDEX: 26.83 KG/M2 | HEIGHT: 68 IN | WEIGHT: 177 LBS | HEART RATE: 49 BPM | SYSTOLIC BLOOD PRESSURE: 135 MMHG | DIASTOLIC BLOOD PRESSURE: 71 MMHG

## 2018-08-27 DIAGNOSIS — I71.2 THORACIC AORTIC ANEURYSM, WITHOUT RUPTURE: ICD-10-CM

## 2018-08-27 DIAGNOSIS — I48.91 UNSPECIFIED ATRIAL FIBRILLATION: Chronic | ICD-10-CM

## 2018-08-27 DIAGNOSIS — Z01.810 ENCOUNTER FOR PREPROCEDURAL CARDIOVASCULAR EXAMINATION: ICD-10-CM

## 2018-08-27 DIAGNOSIS — I71.2 THORACIC AORTIC ANEURYSM, W/OUT RUPTURE: ICD-10-CM

## 2018-08-27 PROBLEM — E78.5 HYPERLIPIDEMIA, UNSPECIFIED: Chronic | Status: ACTIVE | Noted: 2017-02-22

## 2018-08-27 PROBLEM — N39.0 URINARY TRACT INFECTION, SITE NOT SPECIFIED: Chronic | Status: ACTIVE | Noted: 2018-03-24

## 2018-08-27 PROBLEM — A41.51 SEPSIS DUE TO ESCHERICHIA COLI [E. COLI]: Chronic | Status: ACTIVE | Noted: 2018-03-23

## 2018-08-27 PROCEDURE — 71046 X-RAY EXAM CHEST 2 VIEWS: CPT

## 2018-08-27 PROCEDURE — 71046 X-RAY EXAM CHEST 2 VIEWS: CPT | Mod: 26

## 2018-08-27 PROCEDURE — 99215 OFFICE O/P EST HI 40 MIN: CPT | Mod: 25

## 2018-08-27 PROCEDURE — 93000 ELECTROCARDIOGRAM COMPLETE: CPT

## 2018-08-29 LAB
25(OH)D3 SERPL-MCNC: 33.9 NG/ML
ALBUMIN SERPL ELPH-MCNC: 4.4 G/DL
ALP BLD-CCNC: 67 U/L
ALT SERPL-CCNC: 17 U/L
ANION GAP SERPL CALC-SCNC: 11 MMOL/L
APPEARANCE: CLEAR
AST SERPL-CCNC: 20 U/L
BACTERIA: NEGATIVE
BASOPHILS # BLD AUTO: 0.02 K/UL
BASOPHILS NFR BLD AUTO: 0.4 %
BILIRUB SERPL-MCNC: 0.7 MG/DL
BILIRUBIN URINE: NEGATIVE
BLOOD URINE: ABNORMAL
BUN SERPL-MCNC: 23 MG/DL
CALCIUM SERPL-MCNC: 9.1 MG/DL
CHLORIDE SERPL-SCNC: 103 MMOL/L
CHOLEST SERPL-MCNC: 165 MG/DL
CHOLEST/HDLC SERPL: 2.1 RATIO
CO2 SERPL-SCNC: 26 MMOL/L
COLOR: YELLOW
CREAT SERPL-MCNC: 1.02 MG/DL
EOSINOPHIL # BLD AUTO: 0.22 K/UL
EOSINOPHIL NFR BLD AUTO: 4.6 %
GLUCOSE QUALITATIVE U: NEGATIVE MG/DL
GLUCOSE SERPL-MCNC: 95 MG/DL
HBA1C MFR BLD HPLC: 5.6 %
HCT VFR BLD CALC: 46.2 %
HDLC SERPL-MCNC: 79 MG/DL
HGB BLD-MCNC: 14.9 G/DL
HYALINE CASTS: 0 /LPF
IMM GRANULOCYTES NFR BLD AUTO: 0.2 %
KETONES URINE: NEGATIVE
LDLC SERPL CALC-MCNC: 77 MG/DL
LEUKOCYTE ESTERASE URINE: NEGATIVE
LYMPHOCYTES # BLD AUTO: 1.71 K/UL
LYMPHOCYTES NFR BLD AUTO: 35.6 %
MAN DIFF?: NORMAL
MCHC RBC-ENTMCNC: 29.9 PG
MCHC RBC-ENTMCNC: 32.3 GM/DL
MCV RBC AUTO: 92.6 FL
MICROSCOPIC-UA: NORMAL
MONOCYTES # BLD AUTO: 0.37 K/UL
MONOCYTES NFR BLD AUTO: 7.7 %
NEUTROPHILS # BLD AUTO: 2.48 K/UL
NEUTROPHILS NFR BLD AUTO: 51.5 %
NITRITE URINE: NEGATIVE
PH URINE: 6
PLATELET # BLD AUTO: 253 K/UL
POTASSIUM SERPL-SCNC: 4.4 MMOL/L
PROT SERPL-MCNC: 7.7 G/DL
PROTEIN URINE: NEGATIVE MG/DL
RBC # BLD: 4.99 M/UL
RBC # FLD: 13.8 %
RED BLOOD CELLS URINE: 6 /HPF
SODIUM SERPL-SCNC: 140 MMOL/L
SPECIFIC GRAVITY URINE: 1.02
SQUAMOUS EPITHELIAL CELLS: 1 /HPF
TRIGL SERPL-MCNC: 43 MG/DL
TSH SERPL-ACNC: 1.69 UIU/ML
UROBILINOGEN URINE: NEGATIVE MG/DL
WBC # FLD AUTO: 4.81 K/UL
WHITE BLOOD CELLS URINE: 6 /HPF

## 2018-08-31 ENCOUNTER — NON-APPOINTMENT (OUTPATIENT)
Age: 78
End: 2018-08-31

## 2018-09-23 ENCOUNTER — INPATIENT (INPATIENT)
Facility: HOSPITAL | Age: 78
LOS: 3 days | Discharge: HOME | DRG: 205 | End: 2018-09-27
Attending: HOSPITALIST | Admitting: HOSPITALIST
Payer: MEDICARE

## 2018-09-23 ENCOUNTER — NON-APPOINTMENT (OUTPATIENT)
Age: 78
End: 2018-09-23

## 2018-09-23 ENCOUNTER — OUTPATIENT (OUTPATIENT)
Dept: OUTPATIENT SERVICES | Facility: HOSPITAL | Age: 78
LOS: 1 days | End: 2018-09-23
Payer: COMMERCIAL

## 2018-09-23 VITALS
TEMPERATURE: 98 F | DIASTOLIC BLOOD PRESSURE: 73 MMHG | HEIGHT: 68 IN | WEIGHT: 169.98 LBS | RESPIRATION RATE: 14 BRPM | OXYGEN SATURATION: 95 % | SYSTOLIC BLOOD PRESSURE: 123 MMHG | HEART RATE: 81 BPM

## 2018-09-23 DIAGNOSIS — I71.2 THORACIC AORTIC ANEURYSM, WITHOUT RUPTURE: ICD-10-CM

## 2018-09-23 DIAGNOSIS — R06.02 SHORTNESS OF BREATH: ICD-10-CM

## 2018-09-23 DIAGNOSIS — I48.91 UNSPECIFIED ATRIAL FIBRILLATION: Chronic | ICD-10-CM

## 2018-09-23 DIAGNOSIS — I48.0 PAROXYSMAL ATRIAL FIBRILLATION: ICD-10-CM

## 2018-09-23 DIAGNOSIS — R09.89 OTHER SPECIFIED SYMPTOMS AND SIGNS INVOLVING THE CIRCULATORY AND RESPIRATORY SYSTEMS: ICD-10-CM

## 2018-09-23 DIAGNOSIS — R06.00 DYSPNEA, UNSPECIFIED: ICD-10-CM

## 2018-09-23 DIAGNOSIS — R06.89 OTHER ABNORMALITIES OF BREATHING: ICD-10-CM

## 2018-09-23 DIAGNOSIS — J90 PLEURAL EFFUSION, NOT ELSEWHERE CLASSIFIED: ICD-10-CM

## 2018-09-23 DIAGNOSIS — I50.21 ACUTE SYSTOLIC (CONGESTIVE) HEART FAILURE: ICD-10-CM

## 2018-09-23 DIAGNOSIS — I10 ESSENTIAL (PRIMARY) HYPERTENSION: ICD-10-CM

## 2018-09-23 DIAGNOSIS — J45.909 UNSPECIFIED ASTHMA, UNCOMPLICATED: ICD-10-CM

## 2018-09-23 LAB
ALBUMIN SERPL ELPH-MCNC: 2.5 G/DL — LOW (ref 3.3–5)
ALP SERPL-CCNC: 136 U/L — HIGH (ref 30–120)
ALT FLD-CCNC: 113 U/L DA — HIGH (ref 10–60)
ANION GAP SERPL CALC-SCNC: 7 MMOL/L — SIGNIFICANT CHANGE UP (ref 5–17)
APPEARANCE UR: CLEAR — SIGNIFICANT CHANGE UP
APTT BLD: 24.2 SEC — LOW (ref 27.5–37.4)
AST SERPL-CCNC: 50 U/L — HIGH (ref 10–40)
BILIRUB SERPL-MCNC: 0.5 MG/DL — SIGNIFICANT CHANGE UP (ref 0.2–1.2)
BILIRUB UR-MCNC: NEGATIVE — SIGNIFICANT CHANGE UP
BUN SERPL-MCNC: 18 MG/DL — SIGNIFICANT CHANGE UP (ref 7–23)
CALCIUM SERPL-MCNC: 8.3 MG/DL — LOW (ref 8.4–10.5)
CHLORIDE SERPL-SCNC: 103 MMOL/L — SIGNIFICANT CHANGE UP (ref 96–108)
CK SERPL-CCNC: 27 U/L — LOW (ref 39–308)
CO2 SERPL-SCNC: 25 MMOL/L — SIGNIFICANT CHANGE UP (ref 22–31)
COLOR SPEC: YELLOW — SIGNIFICANT CHANGE UP
CREAT SERPL-MCNC: 1.01 MG/DL — SIGNIFICANT CHANGE UP (ref 0.5–1.3)
CRP SERPL-MCNC: 7.76 MG/DL — HIGH (ref 0–0.4)
D DIMER BLD IA.RAPID-MCNC: 3056 NG/ML DDU — HIGH
DIFF PNL FLD: NEGATIVE — SIGNIFICANT CHANGE UP
ERYTHROCYTE [SEDIMENTATION RATE] IN BLOOD: 82 MM/HR — HIGH (ref 0–9)
GLUCOSE SERPL-MCNC: 122 MG/DL — HIGH (ref 70–99)
GLUCOSE UR QL: NEGATIVE MG/DL — SIGNIFICANT CHANGE UP
HCT VFR BLD CALC: 30.8 % — LOW (ref 39–50)
HGB BLD-MCNC: 10.5 G/DL — LOW (ref 13–17)
INR BLD: 1.23 RATIO — HIGH (ref 0.88–1.16)
KETONES UR-MCNC: NEGATIVE — SIGNIFICANT CHANGE UP
LEUKOCYTE ESTERASE UR-ACNC: NEGATIVE — SIGNIFICANT CHANGE UP
MCHC RBC-ENTMCNC: 30.5 PG — SIGNIFICANT CHANGE UP (ref 27–34)
MCHC RBC-ENTMCNC: 34.1 GM/DL — SIGNIFICANT CHANGE UP (ref 32–36)
MCV RBC AUTO: 89.5 FL — SIGNIFICANT CHANGE UP (ref 80–100)
NITRITE UR-MCNC: NEGATIVE — SIGNIFICANT CHANGE UP
NRBC # BLD: 0 /100 WBCS — SIGNIFICANT CHANGE UP (ref 0–0)
NT-PROBNP SERPL-SCNC: 1361 PG/ML — HIGH (ref 0–450)
PH UR: 7 — SIGNIFICANT CHANGE UP (ref 5–8)
PLATELET # BLD AUTO: 527 K/UL — HIGH (ref 150–400)
POTASSIUM SERPL-MCNC: 4.5 MMOL/L — SIGNIFICANT CHANGE UP (ref 3.5–5.3)
POTASSIUM SERPL-SCNC: 4.5 MMOL/L — SIGNIFICANT CHANGE UP (ref 3.5–5.3)
PROT SERPL-MCNC: 7 G/DL — SIGNIFICANT CHANGE UP (ref 6–8.3)
PROT UR-MCNC: NEGATIVE MG/DL — SIGNIFICANT CHANGE UP
PROTHROM AB SERPL-ACNC: 13.5 SEC — HIGH (ref 9.8–12.7)
RBC # BLD: 3.44 M/UL — LOW (ref 4.2–5.8)
RBC # FLD: 13.7 % — SIGNIFICANT CHANGE UP (ref 10.3–14.5)
SODIUM SERPL-SCNC: 135 MMOL/L — SIGNIFICANT CHANGE UP (ref 135–145)
SP GR SPEC: 1.01 — SIGNIFICANT CHANGE UP (ref 1.01–1.02)
TROPONIN I SERPL-MCNC: 0.19 NG/ML — HIGH (ref 0.02–0.06)
TROPONIN I SERPL-MCNC: 0.22 NG/ML — HIGH (ref 0.02–0.06)
UROBILINOGEN FLD QL: NEGATIVE MG/DL — SIGNIFICANT CHANGE UP
WBC # BLD: 10.49 K/UL — SIGNIFICANT CHANGE UP (ref 3.8–10.5)
WBC # FLD AUTO: 10.49 K/UL — SIGNIFICANT CHANGE UP (ref 3.8–10.5)

## 2018-09-23 PROCEDURE — 99223 1ST HOSP IP/OBS HIGH 75: CPT | Mod: AI

## 2018-09-23 PROCEDURE — 71045 X-RAY EXAM CHEST 1 VIEW: CPT | Mod: 26

## 2018-09-23 PROCEDURE — 99285 EMERGENCY DEPT VISIT HI MDM: CPT

## 2018-09-23 PROCEDURE — 93306 TTE W/DOPPLER COMPLETE: CPT | Mod: 26

## 2018-09-23 PROCEDURE — 99223 1ST HOSP IP/OBS HIGH 75: CPT | Mod: 25

## 2018-09-23 PROCEDURE — 71275 CT ANGIOGRAPHY CHEST: CPT | Mod: 26

## 2018-09-23 PROCEDURE — 93010 ELECTROCARDIOGRAM REPORT: CPT

## 2018-09-23 PROCEDURE — 71275 CT ANGIOGRAPHY CHEST: CPT

## 2018-09-23 RX ORDER — ASPIRIN/CALCIUM CARB/MAGNESIUM 324 MG
325 TABLET ORAL DAILY
Qty: 0 | Refills: 0 | Status: DISCONTINUED | OUTPATIENT
Start: 2018-09-23 | End: 2018-09-27

## 2018-09-23 RX ORDER — AMLODIPINE BESYLATE 2.5 MG/1
10 TABLET ORAL DAILY
Qty: 0 | Refills: 0 | Status: DISCONTINUED | OUTPATIENT
Start: 2018-09-23 | End: 2018-09-25

## 2018-09-23 RX ORDER — METOPROLOL TARTRATE 50 MG
1 TABLET ORAL
Qty: 0 | Refills: 0 | COMMUNITY

## 2018-09-23 RX ORDER — TAMSULOSIN HYDROCHLORIDE 0.4 MG/1
1 CAPSULE ORAL
Qty: 0 | Refills: 0 | COMMUNITY

## 2018-09-23 RX ORDER — ENOXAPARIN SODIUM 100 MG/ML
40 INJECTION SUBCUTANEOUS DAILY
Qty: 0 | Refills: 0 | Status: DISCONTINUED | OUTPATIENT
Start: 2018-09-23 | End: 2018-09-25

## 2018-09-23 RX ORDER — INFLUENZA VIRUS VACCINE 15; 15; 15; 15 UG/.5ML; UG/.5ML; UG/.5ML; UG/.5ML
0.5 SUSPENSION INTRAMUSCULAR ONCE
Qty: 0 | Refills: 0 | Status: COMPLETED | OUTPATIENT
Start: 2018-09-23 | End: 2018-09-27

## 2018-09-23 RX ORDER — METOPROLOL TARTRATE 50 MG
50 TABLET ORAL DAILY
Qty: 0 | Refills: 0 | Status: DISCONTINUED | OUTPATIENT
Start: 2018-09-23 | End: 2018-09-26

## 2018-09-23 RX ORDER — PANTOPRAZOLE SODIUM 20 MG/1
40 TABLET, DELAYED RELEASE ORAL
Qty: 0 | Refills: 0 | Status: DISCONTINUED | OUTPATIENT
Start: 2018-09-23 | End: 2018-09-26

## 2018-09-23 RX ORDER — IPRATROPIUM/ALBUTEROL SULFATE 18-103MCG
3 AEROSOL WITH ADAPTER (GRAM) INHALATION ONCE
Qty: 0 | Refills: 0 | Status: COMPLETED | OUTPATIENT
Start: 2018-09-23 | End: 2018-09-23

## 2018-09-23 RX ORDER — ACETAMINOPHEN 500 MG
650 TABLET ORAL EVERY 6 HOURS
Qty: 0 | Refills: 0 | Status: DISCONTINUED | OUTPATIENT
Start: 2018-09-23 | End: 2018-09-27

## 2018-09-23 RX ORDER — AMIODARONE HYDROCHLORIDE 400 MG/1
400 TABLET ORAL
Qty: 0 | Refills: 0 | Status: DISCONTINUED | OUTPATIENT
Start: 2018-09-23 | End: 2018-09-27

## 2018-09-23 RX ORDER — FINASTERIDE 5 MG/1
1 TABLET, FILM COATED ORAL
Qty: 0 | Refills: 0 | COMMUNITY

## 2018-09-23 RX ORDER — BUDESONIDE AND FORMOTEROL FUMARATE DIHYDRATE 160; 4.5 UG/1; UG/1
2 AEROSOL RESPIRATORY (INHALATION)
Qty: 0 | Refills: 0 | Status: DISCONTINUED | OUTPATIENT
Start: 2018-09-23 | End: 2018-09-25

## 2018-09-23 RX ORDER — FUROSEMIDE 40 MG
40 TABLET ORAL ONCE
Qty: 0 | Refills: 0 | Status: COMPLETED | OUTPATIENT
Start: 2018-09-23 | End: 2018-09-23

## 2018-09-23 RX ORDER — FUROSEMIDE 40 MG
20 TABLET ORAL EVERY 12 HOURS
Qty: 0 | Refills: 0 | Status: DISCONTINUED | OUTPATIENT
Start: 2018-09-23 | End: 2018-09-25

## 2018-09-23 RX ORDER — AMIODARONE HYDROCHLORIDE 400 MG/1
1 TABLET ORAL
Qty: 0 | Refills: 0 | COMMUNITY

## 2018-09-23 RX ORDER — METOPROLOL TARTRATE 50 MG
50 TABLET ORAL DAILY
Qty: 0 | Refills: 0 | Status: DISCONTINUED | OUTPATIENT
Start: 2018-09-23 | End: 2018-09-23

## 2018-09-23 RX ORDER — RIVAROXABAN 15 MG-20MG
1 KIT ORAL
Qty: 0 | Refills: 0 | COMMUNITY

## 2018-09-23 RX ORDER — IPRATROPIUM/ALBUTEROL SULFATE 18-103MCG
3 AEROSOL WITH ADAPTER (GRAM) INHALATION EVERY 6 HOURS
Qty: 0 | Refills: 0 | Status: DISCONTINUED | OUTPATIENT
Start: 2018-09-23 | End: 2018-09-25

## 2018-09-23 RX ADMIN — Medication 3 MILLILITER(S): at 23:43

## 2018-09-23 RX ADMIN — AMLODIPINE BESYLATE 10 MILLIGRAM(S): 2.5 TABLET ORAL at 18:09

## 2018-09-23 RX ADMIN — Medication 20 MILLIGRAM(S): at 18:09

## 2018-09-23 RX ADMIN — AMIODARONE HYDROCHLORIDE 400 MILLIGRAM(S): 400 TABLET ORAL at 18:09

## 2018-09-23 RX ADMIN — Medication 125 MILLIGRAM(S): at 11:40

## 2018-09-23 RX ADMIN — ENOXAPARIN SODIUM 40 MILLIGRAM(S): 100 INJECTION SUBCUTANEOUS at 18:09

## 2018-09-23 RX ADMIN — BUDESONIDE AND FORMOTEROL FUMARATE DIHYDRATE 2 PUFF(S): 160; 4.5 AEROSOL RESPIRATORY (INHALATION) at 18:34

## 2018-09-23 RX ADMIN — Medication 3 MILLILITER(S): at 11:46

## 2018-09-23 RX ADMIN — Medication 40 MILLIGRAM(S): at 11:38

## 2018-09-23 RX ADMIN — Medication 3 MILLILITER(S): at 18:34

## 2018-09-23 NOTE — H&P ADULT - NSHPPHYSICALEXAM_GEN_ALL_CORE
comfortable  no JVD  midline chest wound  lungs - diminished breath sounds at bases with few rales  S1S2, regular rhythm  Abdomen soft, non-tender  no edema

## 2018-09-23 NOTE — CONSULT NOTE ADULT - ASSESSMENT
* I discussed case with Dr. Lopez, Dr. Baxter, Dr. Gary.
Pt with recent aortic arch replacement, presents with sob. Hi d-dimer. R/o PE, CHF, pneumonia, atelectasis, pleural effusion.  Hx mild asthma, on beta blocker.

## 2018-09-23 NOTE — H&P ADULT - ASSESSMENT
78 male with    1. Acute CHF exacerbation with normal EF/Postoperative state from AAA/AVR: monitor intake and output on lasix.   daily weights. f/up cardio recs - d/w Chano Palumbo/John.    2. Paroxysmal A.fib: Aspirin for CVA prevention, Toprol for rate control    3. Asthma: nebs prn    4. Elevated d-dimer: f/up CT angio     5. Transaminitis: hold statin for now and avoid hepatotoxic meds.  Monitor daily.    6. IMPROVE VTE Individual Risk Assessment          RISK                                                          Points    [  ] Previous VTE                                                3    [  ] Thrombophilia                                             2    [  ] Lower limb paralysis                                    2        (unable to hold up >15 seconds)      [  ] Current Cancer                                             2         (within 6 months)    [  ] Immobilization > 24 hrs                              1    [  ] ICU/CCU stay > 24 hours                            1    [ x ] Age > 60                                                    1    IMPROVE VTE Score 1  Lovenox for dvt ppx    7. Dispo: home in 2-3 days hopefully.  Plan of care d/w RN, consultants, family.  spoke to Dr. Walton at [place].  spent 75 mins 78 male with    1. Acute CHF exacerbation with normal EF/Postoperative state from AAA/AVR / right>left pleural effusions: monitor intake and output on lasix.   daily weights. f/up cardio recs - d/w Chano Palumbo/John.    2. Paroxysmal A.fib: Aspirin for CVA prevention, Toprol for rate control    3. Asthma/Congenital bullous lung disease: nebs prn.  I reviewed images with patient/wife at bedside.    4. Elevated d-dimer: no PE on CT angio.    5. Transaminitis: hold statin for now and avoid hepatotoxic meds.  Monitor daily.    6. IMPROVE VTE Individual Risk Assessment          RISK                                                          Points    [  ] Previous VTE                                                3    [  ] Thrombophilia                                             2    [  ] Lower limb paralysis                                    2        (unable to hold up >15 seconds)      [  ] Current Cancer                                             2         (within 6 months)    [  ] Immobilization > 24 hrs                              1    [  ] ICU/CCU stay > 24 hours                            1    [ x ] Age > 60                                                    1    IMPROVE VTE Score 1  Lovenox for dvt ppx    7. Dispo: home in 2-3 days hopefully.  Plan of care d/w RN, consultants, family.  spoke to Dr. Walton at [place].  spent 75 mins

## 2018-09-23 NOTE — CONSULT NOTE ADULT - SUBJECTIVE AND OBJECTIVE BOX
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  He returned home following open heart surgery on Friday (9/21); experienced 2 days of mild shortness of breath that worsens with the supine position and is associated with mildly decreased SPO2.  No relief with albuterol or Symbicort.  No associated edema, palpitations, angina.    PAST MEDICAL & SURGICAL HISTORY:  UTI (urinary tract infection)  Sepsis due to Escherichia coli  Thoracic aortic aneurysm without rupture  HLD (hyperlipidemia)  BPH (Benign Prostatic Hyperplasia)  HTN (hypertension)  AF (atrial fibrillation)  Atrial fibrillation status post cardioversion  S/p tibial fracture  s/p aortic surgery + AVR + reimplantation of coronary arteries    SOCIAL HISTORY:   Smoking: Nonsmoker  Drug Use: Denied  Marital Status:     FAMILY HISTORY: No pertinent family history in first degree relatives    MEDICATIONS  (STANDING):  ALBUTerol/ipratropium for Nebulization 3 milliLiter(s) Nebulizer every 6 hours  buDESOnide 160 MICROgram(s)/formoterol 4.5 MICROgram(s) Inhaler 2 Puff(s) Inhalation two times a day  methylPREDNISolone sodium succinate Injectable 40 milliGRAM(s) IV Push Once    Home Medications:  acetaminophen 325 mg oral tablet: 2 tab(s) orally every 6 hours, As needed, For Temp greater than 38 C (100.4 F) (23 Sep 2018 12:50)  amiodarone 400 mg oral tablet: 1 tab(s) orally once a day (23 Sep 2018 12:50)  amLODIPine 10 mg oral tablet: 1 tab(s) orally once a day (23 Sep 2018 12:50)  aspirin 325 mg oral tablet: 1 tab(s) orally once a day (23 Sep 2018 12:50)  Colace 100 mg oral capsule: orally 3 times a day (23 Sep 2018 12:50)  HYDROcodone-acetaminophen 5 mg-300 mg oral tablet: 1  orally every 4 hours, As Needed (23 Sep 2018 12:50)  Lipitor 10 mg oral tablet: 1 tab(s) orally once a day (23 Sep 2018 12:50)  losartan 100 mg oral tablet: 1 tab(s) orally once a day (23 Sep 2018 12:50)  Metoprolol Succinate ER 50 mg oral tablet, extended release: 2 tab(s) orally once a day (23 Sep 2018 12:50)  Senna: 17.2 milligram(s) orally once a day (at bedtime) (23 Sep 2018 12:50)    Allergies:  No Known Allergies    REVIEW OF SYSTEMS:  CONSTITUTIONAL: No weakness, fevers or chills  Eyes: No visual changes  NECK: No pain or stiffness  RESPIRATORY: Infrequent cough, +shortness of breath SEE HPI  CARDIOVASCULAR: No chest pain or palpitations; no edema  GASTROINTESTINAL: No abdominal pain. No nausea, vomiting, or hematemesis; No diarrhea or constipation. No melena or hematochezia.  GENITOURINARY: No dysuria, frequency or hematuria  NEUROLOGICAL: No numbness.  SKIN: No itching or rash  All other review of systems is negative unless indicated above    VITAL SIGNS:   Vital Signs Last 24 Hrs  T(C): 36.8 (23 Sep 2018 10:12), Max: 36.8 (23 Sep 2018 10:12)  T(F): 98.2 (23 Sep 2018 10:12), Max: 98.2 (23 Sep 2018 10:12)  HR: 79 (23 Sep 2018 12:04) (79 - 84)  BP: 133/74 (23 Sep 2018 12:04) (122/62 - 133/74)  RR: 15 (23 Sep 2018 12:04) (14 - 16)  SpO2: 97% (23 Sep 2018 12:04) (95% - 97%)    PHYSICAL EXAM:  Constitutional: NAD, awake and alert, well-appearing  HEENT:  No oral cyanosis.  Pulmonary: Non-labored, breath sounds are decreased at bases with scant crackles  Cardiovascular: S1 and S2, regular rate and rhythm  Gastrointestinal: Bowel Sounds present, soft, nontender.   Lymph: No peripheral edema. No cervical lymphadenopathy.  Neurological: Alert, no focal deficits  Skin: No rashes.  Psych:  Mood & affect appropriate    LABS:                    10.5   10.49 )-----------( 527      ( 23 Sep 2018 10:40 )             30.8     135    |  103    |  18     ----------------------------<  122    4.5     |  25     |  1.01     CARDIAC MARKERS ( 23 Sep 2018 10:40 ) .219 ng/mL / x     / x     / x     / x      Pro Bnp 1361    ECG: Sinus rhythm    Transthoracic Echocardiogram w/Doppler Complete (09.23.18 @ 12:00):  1. Normal left ventricular size and function. LVEF estimated at 70%.  2. Grossly normal right ventricular size and function.  3. Mild left atrial enlargement.  4. Bioprosthetic aortic valve present.  The peak transaortic velocity is approximately 1.6 m/s, which is normal in the setting of a prosthetic valve.  5. Minimal pericardial effusion.    Xray Chest 1 View- PORTABLE-Urgent (09.23.18 @ 10:29):  Increased pulmonary vascular congestion suggestive of congestive heart failure. Small bilateral pleural effusions are noted. Status post sternotomy.
PULMONARY/CRITICAL CARE        Patient is a 78y old  Male who presents with a chief complaint of   BRIEF HOSPITAL COURSE: ***    · Chief Complaint: The patient is a 78y Male complaining of shortness of breath.	  · HPI Objective Statement: Patient came in ambulatory with wife complaining of increasing sob since yesterday no cough or fever  Was recently discharged 2 days ago rom [facility] after he has undergo repair of thoracic aneurysm, Aortic valve replacement and coronary artery repair state surgery . Was given multiple doses of Lasix while there. Had increase in beta blocker dose, with some sob. Tried inhalers with minimal response. Denies CP.	  Pt measured oximetry at home and dropped to 90% overnite.  Events last 24 hours: ***    PAST MEDICAL & SURGICAL HISTORY:  UTI (urinary tract infection)  Sepsis due to Escherichia coli  Thoracic aortic aneurysm without rupture  HLD (hyperlipidemia)  BPH (Benign Prostatic Hyperplasia)  HTN (hypertension)  AF (atrial fibrillation)  Atrial fibrillation status post cardioversion  S/p tibial fracture  Mild asthma.  Allergies    No Known Allergies    Intolerances      FAMILY HISTORY and SOCIAL: no cigs. Urologist.  No pertinent family history in first degree relatives      Review of Systems:  CONSTITUTIONAL: No fever, chills, or fatigue  EYES: No eye pain, visual disturbances, or discharge  ENMT:  No difficulty hearing, tinnitus, vertigo; No sinus or throat pain  NECK: No pain or stiffness  RESPIRATORY: nonprod cough, wheezing, no  chills or hemoptysis; has shortness of breath  CARDIOVASCULAR: some incisional  chest pain, palpitations, dizziness, or leg swelling  GASTROINTESTINAL: No abdominal or epigastric pain. No nausea, vomiting, or hematemesis; No diarrhea or constipation. No melena or hematochezia.  GENITOURINARY: No dysuria, frequency, hematuria, or incontinence  NEUROLOGICAL: No headaches, memory loss, loss of strength, numbness, or tremors  SKIN: No itching, burning, rashes, or lesions   MUSCULOSKELETAL: No joint pain or swelling; No muscle, back, or extremity pain  PSYCHIATRIC: No depression, anxiety, mood swings, or difficulty sleeping      Medications:    furosemide   Injectable 40 milliGRAM(s) IV Push Once                methylPREDNISolone sodium succinate Injectable 125 milliGRAM(s) IV Push Once                  ICU Vital Signs Last 24 Hrs  T(C): 36.8 (23 Sep 2018 10:12), Max: 36.8 (23 Sep 2018 10:12)  T(F): 98.2 (23 Sep 2018 10:12), Max: 98.2 (23 Sep 2018 10:12)  HR: 81 (23 Sep 2018 10:12) (81 - 81)  BP: 123/73 (23 Sep 2018 10:12) (123/73 - 123/73)  BP(mean): --  ABP: --  ABP(mean): --  RR: 14 (23 Sep 2018 10:12) (14 - 14)  SpO2: 95% (23 Sep 2018 10:12) (95% - 95%)    Vital Signs Last 24 Hrs  T(C): 36.8 (23 Sep 2018 10:12), Max: 36.8 (23 Sep 2018 10:12)  T(F): 98.2 (23 Sep 2018 10:12), Max: 98.2 (23 Sep 2018 10:12)  HR: 81 (23 Sep 2018 10:12) (81 - 81)  BP: 123/73 (23 Sep 2018 10:12) (123/73 - 123/73)  BP(mean): --  RR: 14 (23 Sep 2018 10:12) (14 - 14)  SpO2: 95% (23 Sep 2018 10:12) (95% - 95%)        I&O's Detail        LABS:                        10.5   10.49 )-----------( 527      ( 23 Sep 2018 10:40 )             30.8     09-23    135  |  103  |  18  ----------------------------<  122<H>  4.5   |  25  |  1.01    Ca    8.3<L>      23 Sep 2018 10:40    TPro  7.0  /  Alb  2.5<L>  /  TBili  0.5  /  DBili  x   /  AST  50<H>  /  ALT  113<H>  /  AlkPhos  136<H>  09-23      CARDIAC MARKERS ( 23 Sep 2018 10:40 )  .219 ng/mL / x     / x     / x     / x          CAPILLARY BLOOD GLUCOSE        PT/INR - ( 23 Sep 2018 10:40 )   PT: 13.5 sec;   INR: 1.23 ratio         PTT - ( 23 Sep 2018 10:40 )  PTT:24.2 sec    CULTURES:      Physical Examination:    General: mild  acute distress.      HEENT: Pupils equal, reactive to light.  Symmetric.    PULM:  few bas crackles, decreased bs, dull to percuss 1/4-1/3 up on right, left base. No wheeze.    CVS: Regular rate and rhythm, no murmurs, rubs, or gallops  Sternotomy wound healing.    ABD: Soft, nondistended, nontender, normoactive bowel sounds, no masses    EXT: No edema, nontender    SKIN: Warm and well perfused, no rashes noted.    NEURO: Alert, oriented, interactive, nonfocal    RADIOLOGY: ***< from: Xray Chest 1 View- PORTABLE-Urgent (09.23.18 @ 10:29) >    EXAM:  XR CHEST PORTABLE URGENT 1V                                  PROCEDURE DATE:  09/23/2018          INTERPRETATION:  Chest radiograph (one view)     CPT 47143    CLINICAL INFORMATION:       Severe shortness of breath times several   hours.    TECHNIQUE:  Single frontal view of the chest was obtained.    FINDINGS:  Prior study dated the 27th 2018 available for review.    The lungs demonstrate increased pulmonary vascular congestion. Small   bilateral pleural effusions are noted.    The heart is difficult to evaluate. Patient is status post median   sternotomy. Aortic clip is noted.           IMPRESSION: Increased pulmonary vascular congestion suggestive of   congestive heart failure. Small bilateral pleural effusions are noted.   Statuspost sternotomy.        < end of copied text >    EKG inverted t waves ant leads  CRITICAL CARE TIME SPENT: ***

## 2018-09-23 NOTE — ED PROVIDER NOTE - OBJECTIVE STATEMENT
Patient came in ambulatory with wife complaining of increasing sob since yesterday no cough or fever state recently discharge 2 days ago At [facility] after he has undergo repair of thoracic aneurysm, Aortic valve replacement and coronary artery repair state surgery was done Sept 13, 2018. Other PMH

## 2018-09-23 NOTE — ED ADULT NURSE REASSESSMENT NOTE - NS ED NURSE REASSESS COMMENT FT1
1340- Pt returned from [place] & [facility]

## 2018-09-23 NOTE — ED ADULT NURSE REASSESSMENT NOTE - NS ED NURSE REASSESS COMMENT FT1
1145- Rec Lasix, Solumedrol & Duoneb as per MD. Arrangements being made for transfer to [place] for CTA    1205- Breathing easier. "Feeling better"  Voiding clear yellow urine without difficulty. U/A to lab

## 2018-09-23 NOTE — CONSULT NOTE ADULT - PROBLEM SELECTOR RECOMMENDATION 9
Mild symptoms; abnormal chest radiograph -- need CT chest to better characterize abnormalities (? effusion, edema, atelectasis); CT not available in [facility] today; patient transferred to [place] for imaging; gentle diuresis with IV lasix is reasonable (1 dose given in ED) -- does not appear to be significantly volume overloaded. No significant pericardial effusion on echo.

## 2018-09-23 NOTE — H&P ADULT - HISTORY OF PRESENT ILLNESS
78m s/p recent extensive thoracic surgery at [place] on 9/12 by Dr. 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 Post-op course complicated by A.fib and CHF requiring initiation of Amiodarone and Lasix. No Lasix prescribed on discharge, now p/w SOB.   CXR shows congestion and small effusions.  In ER, given Lasix 40 iv, Solumedrol and nebs.  Seen by cardio/pulm.  Elevated d-dimer - CT angio pending.

## 2018-09-23 NOTE — ED ADULT NURSE NOTE - NSIMPLEMENTINTERV_GEN_ALL_ED
Implemented All Fall with Harm Risk Interventions:  [place] to call system. Call bell, personal items and telephone within reach. Instruct patient to call for assistance. Room bathroom lighting operational. Non-slip footwear when patient is off stretcher. Physically safe environment: no spills, clutter or unnecessary equipment. Stretcher in lowest position, wheels locked, appropriate side rails in place. Provide visual cue, wrist band, yellow gown, etc. Monitor gait and stability. Monitor for mental status changes and reorient to person, place, and time. Review medications for side effects contributing to fall risk. Reinforce activity limits and safety measures with patient and family. Provide visual clues: red socks.

## 2018-09-23 NOTE — H&P ADULT - NSHPLABSRESULTS_GEN_ALL_CORE
Hematocrit: 30.8 % <L> ( @ 10:40)  Platelet Count - Automated: 527 K/uL <H> ( @ 10:40)  Hemoglobin: 10.5 g/dL <L> ( @ 10:40)  WBC Count: 10.49 K/uL ( @ 10:40)  RBC Count: 3.44 M/uL <L> ( @ 10:40)          135  |  103  |  18  ----------------------------<  122<H>  4.5   |  25  |  1.01    Ca    8.3<L>      23 Sep 2018 10:40    TPro  7.0  /  Alb  2.5<L>  /  TBili  0.5  /  DBili  x   /  AST  50<H>  /  ALT  113<H>  /  AlkPhos  136<H>            CARDIAC MARKERS ( 23 Sep 2018 10:40 )  .219 ng/mL / x     / x     / x     / x            PT/INR - ( 23 Sep 2018 10:40 )   PT: 13.5 sec;   INR: 1.23 ratio         PTT - ( 23 Sep 2018 10:40 )  PTT:24.2 sec    Urinalysis Basic - ( 23 Sep 2018 12:17 )    Color: Yellow / Appearance: Clear / S.010 / pH: x  Gluc: x / Ketone: Negative  / Bili: Negative / Urobili: Negative mg/dL   Blood: x / Protein: Negative mg/dL / Nitrite: Negative   Leuk Esterase: Negative / RBC: x / WBC x   Sq Epi: x / Non Sq Epi: x / Bacteria: x        Aspartate Aminotransferase (AST/SGOT): 50 U/L <H> ( @ 10:40)  Albumin, Serum: 2.5 g/dL <L> ( @ 10:40)  Alkaline Phosphatase, Serum: 136 U/L <H> ( @ 10:40)  Alanine Aminotransferase (ALT/SGPT): 113 U/L DA <H> ( @ 10:40)  Bilirubin Total, Serum: 0.5 mg/dL ( @ 10:40)            CXR: congestion  EKG: sinus with APCs and ST depressions V2, 3

## 2018-09-23 NOTE — ED PROVIDER NOTE - PMH
AF (atrial fibrillation)    BPH (Benign Prostatic Hyperplasia)    HLD (hyperlipidemia)    HTN (hypertension)    Sepsis due to Escherichia coli    Thoracic aortic aneurysm without rupture    UTI (urinary tract infection)

## 2018-09-23 NOTE — ED ADULT NURSE NOTE - OBJECTIVE STATEMENT
Amb to ED. Pt had cardiac surgery performed on 9/13/18 at [place].  Was discharged from hospital 2 days ago. C/O SOB since last night. O/E color fair. Skin warm & dry to touch. Lungs- clear -no wheeze, diminished at bases.  Abd- soft, non tender. Steristrips in place to sternal wound. No redness or drainage noted.  Open area noted mid upper abd where drain was removed. No active drainage noted from site. CM- RSR without ectopics noted.

## 2018-09-24 ENCOUNTER — RESULT REVIEW (OUTPATIENT)
Age: 78
End: 2018-09-24

## 2018-09-24 LAB
ALBUMIN SERPL ELPH-MCNC: 2.5 G/DL — LOW (ref 3.3–5)
ALP SERPL-CCNC: 127 U/L — HIGH (ref 30–120)
ALT FLD-CCNC: 98 U/L DA — HIGH (ref 10–60)
ANION GAP SERPL CALC-SCNC: 8 MMOL/L — SIGNIFICANT CHANGE UP (ref 5–17)
AST SERPL-CCNC: 28 U/L — SIGNIFICANT CHANGE UP (ref 10–40)
B PERT IGG+IGM PNL SER: ABNORMAL
BASOPHILS # BLD AUTO: 0.01 K/UL — SIGNIFICANT CHANGE UP (ref 0–0.2)
BASOPHILS NFR BLD AUTO: 0.1 % — SIGNIFICANT CHANGE UP (ref 0–2)
BILIRUB SERPL-MCNC: 0.5 MG/DL — SIGNIFICANT CHANGE UP (ref 0.2–1.2)
BUN SERPL-MCNC: 19 MG/DL — SIGNIFICANT CHANGE UP (ref 7–23)
CALCIUM SERPL-MCNC: 8.7 MG/DL — SIGNIFICANT CHANGE UP (ref 8.4–10.5)
CHLORIDE SERPL-SCNC: 99 MMOL/L — SIGNIFICANT CHANGE UP (ref 96–108)
CO2 SERPL-SCNC: 26 MMOL/L — SIGNIFICANT CHANGE UP (ref 22–31)
COLOR FLD: SIGNIFICANT CHANGE UP
CREAT SERPL-MCNC: 1.21 MG/DL — SIGNIFICANT CHANGE UP (ref 0.5–1.3)
EOSINOPHIL # BLD AUTO: 0 K/UL — SIGNIFICANT CHANGE UP (ref 0–0.5)
EOSINOPHIL NFR BLD AUTO: 0 % — SIGNIFICANT CHANGE UP (ref 0–6)
FLUID INTAKE SUBSTANCE CLASS: SIGNIFICANT CHANGE UP
GLUCOSE SERPL-MCNC: 154 MG/DL — HIGH (ref 70–99)
HCT VFR BLD CALC: 30.9 % — LOW (ref 39–50)
HGB BLD-MCNC: 10.4 G/DL — LOW (ref 13–17)
IMM GRANULOCYTES NFR BLD AUTO: 0.7 % — SIGNIFICANT CHANGE UP (ref 0–1.5)
LYMPHOCYTES # BLD AUTO: 0.52 K/UL — LOW (ref 1–3.3)
LYMPHOCYTES # BLD AUTO: 3.9 % — LOW (ref 13–44)
MCHC RBC-ENTMCNC: 30.2 PG — SIGNIFICANT CHANGE UP (ref 27–34)
MCHC RBC-ENTMCNC: 33.7 GM/DL — SIGNIFICANT CHANGE UP (ref 32–36)
MCV RBC AUTO: 89.8 FL — SIGNIFICANT CHANGE UP (ref 80–100)
MONOCYTES # BLD AUTO: 0.24 K/UL — SIGNIFICANT CHANGE UP (ref 0–0.9)
MONOCYTES NFR BLD AUTO: 1.8 % — LOW (ref 2–14)
NEUTROPHILS # BLD AUTO: 12.56 K/UL — HIGH (ref 1.8–7.4)
NEUTROPHILS NFR BLD AUTO: 93.5 % — HIGH (ref 43–77)
NRBC # BLD: 0 /100 WBCS — SIGNIFICANT CHANGE UP (ref 0–0)
NT-PROBNP SERPL-SCNC: 1094 PG/ML — HIGH (ref 0–450)
PH FLD: 7.56 — SIGNIFICANT CHANGE UP
PLATELET # BLD AUTO: 629 K/UL — HIGH (ref 150–400)
POTASSIUM SERPL-MCNC: 4.3 MMOL/L — SIGNIFICANT CHANGE UP (ref 3.5–5.3)
POTASSIUM SERPL-SCNC: 4.3 MMOL/L — SIGNIFICANT CHANGE UP (ref 3.5–5.3)
PROCALCITONIN SERPL-MCNC: 0.03 NG/ML — SIGNIFICANT CHANGE UP (ref 0.02–0.1)
PROT SERPL-MCNC: 7.1 G/DL — SIGNIFICANT CHANGE UP (ref 6–8.3)
RBC # BLD: 3.44 M/UL — LOW (ref 4.2–5.8)
RBC # FLD: 13.6 % — SIGNIFICANT CHANGE UP (ref 10.3–14.5)
RCV VOL RI: HIGH /UL (ref 0–5)
SODIUM SERPL-SCNC: 133 MMOL/L — LOW (ref 135–145)
TOTAL NUCLEATED CELL COUNT, BODY FLUID: 1310 /UL — HIGH (ref 0–5)
TROPONIN I SERPL-MCNC: 0.16 NG/ML — HIGH (ref 0.02–0.06)
TUBE TYPE: SIGNIFICANT CHANGE UP
WBC # BLD: 13.42 K/UL — HIGH (ref 3.8–10.5)
WBC # FLD AUTO: 13.42 K/UL — HIGH (ref 3.8–10.5)

## 2018-09-24 PROCEDURE — 32555 ASPIRATE PLEURA W/ IMAGING: CPT | Mod: RT

## 2018-09-24 PROCEDURE — 99233 SBSQ HOSP IP/OBS HIGH 50: CPT

## 2018-09-24 PROCEDURE — 76604 US EXAM CHEST: CPT | Mod: 26

## 2018-09-24 PROCEDURE — 93970 EXTREMITY STUDY: CPT | Mod: 26

## 2018-09-24 PROCEDURE — 71045 X-RAY EXAM CHEST 1 VIEW: CPT | Mod: 26,76

## 2018-09-24 PROCEDURE — 88108 CYTOPATH CONCENTRATE TECH: CPT | Mod: 26

## 2018-09-24 PROCEDURE — 88305 TISSUE EXAM BY PATHOLOGIST: CPT | Mod: 26

## 2018-09-24 RX ORDER — MAGNESIUM HYDROXIDE 400 MG/1
30 TABLET, CHEWABLE ORAL DAILY
Qty: 0 | Refills: 0 | Status: DISCONTINUED | OUTPATIENT
Start: 2018-09-24 | End: 2018-09-27

## 2018-09-24 RX ADMIN — Medication 20 MILLIGRAM(S): at 18:09

## 2018-09-24 RX ADMIN — Medication 50 MILLIGRAM(S): at 06:45

## 2018-09-24 RX ADMIN — PANTOPRAZOLE SODIUM 40 MILLIGRAM(S): 20 TABLET, DELAYED RELEASE ORAL at 06:45

## 2018-09-24 RX ADMIN — Medication 3 MILLILITER(S): at 20:18

## 2018-09-24 RX ADMIN — Medication 40 MILLIGRAM(S): at 07:20

## 2018-09-24 RX ADMIN — Medication 325 MILLIGRAM(S): at 06:49

## 2018-09-24 RX ADMIN — Medication 3 MILLILITER(S): at 07:21

## 2018-09-24 RX ADMIN — Medication 3 MILLILITER(S): at 13:05

## 2018-09-24 RX ADMIN — BUDESONIDE AND FORMOTEROL FUMARATE DIHYDRATE 2 PUFF(S): 160; 4.5 AEROSOL RESPIRATORY (INHALATION) at 07:23

## 2018-09-24 RX ADMIN — AMIODARONE HYDROCHLORIDE 400 MILLIGRAM(S): 400 TABLET ORAL at 06:45

## 2018-09-24 RX ADMIN — AMIODARONE HYDROCHLORIDE 400 MILLIGRAM(S): 400 TABLET ORAL at 18:09

## 2018-09-24 RX ADMIN — AMLODIPINE BESYLATE 10 MILLIGRAM(S): 2.5 TABLET ORAL at 18:09

## 2018-09-24 RX ADMIN — MAGNESIUM HYDROXIDE 30 MILLILITER(S): 400 TABLET, CHEWABLE ORAL at 20:28

## 2018-09-24 RX ADMIN — Medication 20 MILLIGRAM(S): at 06:44

## 2018-09-24 RX ADMIN — BUDESONIDE AND FORMOTEROL FUMARATE DIHYDRATE 2 PUFF(S): 160; 4.5 AEROSOL RESPIRATORY (INHALATION) at 18:44

## 2018-09-24 NOTE — BRIEF OPERATIVE NOTE - PROCEDURE
<<-----Click on this checkbox to enter Procedure Thoracentesis of right pleural cavity with ultrasound guidance with insertion of chest tube  09/24/2018    Active  ENRIQUE

## 2018-09-25 LAB
ALBUMIN FLD-MCNC: 2.3 G/DL — SIGNIFICANT CHANGE UP
ALBUMIN SERPL ELPH-MCNC: 2.3 G/DL — LOW (ref 3.3–5)
ALP SERPL-CCNC: 107 U/L — SIGNIFICANT CHANGE UP (ref 30–120)
ALT FLD-CCNC: 78 U/L DA — HIGH (ref 10–60)
ANION GAP SERPL CALC-SCNC: 5 MMOL/L — SIGNIFICANT CHANGE UP (ref 5–17)
ANION GAP SERPL CALC-SCNC: 5 MMOL/L — SIGNIFICANT CHANGE UP (ref 5–17)
AST SERPL-CCNC: 21 U/L — SIGNIFICANT CHANGE UP (ref 10–40)
BILIRUB SERPL-MCNC: 0.4 MG/DL — SIGNIFICANT CHANGE UP (ref 0.2–1.2)
BUN SERPL-MCNC: 29 MG/DL — HIGH (ref 7–23)
BUN SERPL-MCNC: 31 MG/DL — HIGH (ref 7–23)
CALCIUM SERPL-MCNC: 7.9 MG/DL — LOW (ref 8.4–10.5)
CALCIUM SERPL-MCNC: 8 MG/DL — LOW (ref 8.4–10.5)
CHLORIDE SERPL-SCNC: 102 MMOL/L — SIGNIFICANT CHANGE UP (ref 96–108)
CHLORIDE SERPL-SCNC: 99 MMOL/L — SIGNIFICANT CHANGE UP (ref 96–108)
CK MB BLD-MCNC: 1.4 % — SIGNIFICANT CHANGE UP (ref 0–3.5)
CK MB BLD-MCNC: 2.7 % — SIGNIFICANT CHANGE UP (ref 0–3.5)
CK MB CFR SERPL CALC: 0.5 NG/ML — SIGNIFICANT CHANGE UP (ref 0–3.6)
CK MB CFR SERPL CALC: 0.6 NG/ML — SIGNIFICANT CHANGE UP (ref 0–3.6)
CK SERPL-CCNC: 22 U/L — LOW (ref 39–308)
CK SERPL-CCNC: 37 U/L — LOW (ref 39–308)
CO2 SERPL-SCNC: 26 MMOL/L — SIGNIFICANT CHANGE UP (ref 22–31)
CO2 SERPL-SCNC: 29 MMOL/L — SIGNIFICANT CHANGE UP (ref 22–31)
CREAT SERPL-MCNC: 1.08 MG/DL — SIGNIFICANT CHANGE UP (ref 0.5–1.3)
CREAT SERPL-MCNC: 1.23 MG/DL — SIGNIFICANT CHANGE UP (ref 0.5–1.3)
FLUID SEGMENTED GRANULOCYTES: 3 % — SIGNIFICANT CHANGE UP
GLUCOSE FLD-MCNC: 190 MG/DL — SIGNIFICANT CHANGE UP
GLUCOSE SERPL-MCNC: 121 MG/DL — HIGH (ref 70–99)
GLUCOSE SERPL-MCNC: 144 MG/DL — HIGH (ref 70–99)
GRAM STN FLD: SIGNIFICANT CHANGE UP
HCT VFR BLD CALC: 30.4 % — LOW (ref 39–50)
HGB BLD-MCNC: 10.1 G/DL — LOW (ref 13–17)
LDH SERPL L TO P-CCNC: 243 U/L — SIGNIFICANT CHANGE UP
LYMPHOCYTES # FLD: 59 % — SIGNIFICANT CHANGE UP
MAGNESIUM SERPL-MCNC: 2.3 MG/DL — SIGNIFICANT CHANGE UP (ref 1.6–2.6)
MAGNESIUM SERPL-MCNC: 2.6 MG/DL — SIGNIFICANT CHANGE UP (ref 1.6–2.6)
MCHC RBC-ENTMCNC: 29.8 PG — SIGNIFICANT CHANGE UP (ref 27–34)
MCHC RBC-ENTMCNC: 33.2 GM/DL — SIGNIFICANT CHANGE UP (ref 32–36)
MCV RBC AUTO: 89.7 FL — SIGNIFICANT CHANGE UP (ref 80–100)
MESOTHL CELL # FLD: 7 % — SIGNIFICANT CHANGE UP
MONOS+MACROS # FLD: 28 % — SIGNIFICANT CHANGE UP
NIGHT BLUE STAIN TISS: SIGNIFICANT CHANGE UP
NRBC # BLD: 0 /100 WBCS — SIGNIFICANT CHANGE UP (ref 0–0)
OTHER CELLS FLD MANUAL: 3 % — SIGNIFICANT CHANGE UP
PHOSPHATE SERPL-MCNC: 3.6 MG/DL — SIGNIFICANT CHANGE UP (ref 2.5–4.5)
PLATELET # BLD AUTO: 548 K/UL — HIGH (ref 150–400)
POTASSIUM SERPL-MCNC: 4.2 MMOL/L — SIGNIFICANT CHANGE UP (ref 3.5–5.3)
POTASSIUM SERPL-MCNC: 4.7 MMOL/L — SIGNIFICANT CHANGE UP (ref 3.5–5.3)
POTASSIUM SERPL-SCNC: 4.2 MMOL/L — SIGNIFICANT CHANGE UP (ref 3.5–5.3)
POTASSIUM SERPL-SCNC: 4.7 MMOL/L — SIGNIFICANT CHANGE UP (ref 3.5–5.3)
PROT FLD-MCNC: 3.7 G/DL — SIGNIFICANT CHANGE UP
PROT SERPL-MCNC: 6.3 G/DL — SIGNIFICANT CHANGE UP (ref 6–8.3)
RBC # BLD: 3.39 M/UL — LOW (ref 4.2–5.8)
RBC # FLD: 13.7 % — SIGNIFICANT CHANGE UP (ref 10.3–14.5)
SODIUM SERPL-SCNC: 133 MMOL/L — LOW (ref 135–145)
SODIUM SERPL-SCNC: 133 MMOL/L — LOW (ref 135–145)
SPECIMEN SOURCE: SIGNIFICANT CHANGE UP
SPECIMEN SOURCE: SIGNIFICANT CHANGE UP
TROPONIN I SERPL-MCNC: 0.14 NG/ML — HIGH (ref 0.02–0.06)
TROPONIN I SERPL-MCNC: 0.16 NG/ML — HIGH (ref 0.02–0.06)
WBC # BLD: 16.7 K/UL — HIGH (ref 3.8–10.5)
WBC # FLD AUTO: 16.7 K/UL — HIGH (ref 3.8–10.5)

## 2018-09-25 PROCEDURE — 99233 SBSQ HOSP IP/OBS HIGH 50: CPT

## 2018-09-25 PROCEDURE — 93010 ELECTROCARDIOGRAM REPORT: CPT | Mod: 77

## 2018-09-25 PROCEDURE — 93010 ELECTROCARDIOGRAM REPORT: CPT

## 2018-09-25 PROCEDURE — 71045 X-RAY EXAM CHEST 1 VIEW: CPT | Mod: 26

## 2018-09-25 RX ORDER — SODIUM CHLORIDE 9 MG/ML
250 INJECTION INTRAMUSCULAR; INTRAVENOUS; SUBCUTANEOUS ONCE
Qty: 0 | Refills: 0 | Status: COMPLETED | OUTPATIENT
Start: 2018-09-25 | End: 2018-09-25

## 2018-09-25 RX ORDER — ALPRAZOLAM 0.25 MG
0.25 TABLET ORAL ONCE
Qty: 0 | Refills: 0 | Status: DISCONTINUED | OUTPATIENT
Start: 2018-09-25 | End: 2018-09-25

## 2018-09-25 RX ORDER — SODIUM CHLORIDE 9 MG/ML
1000 INJECTION INTRAMUSCULAR; INTRAVENOUS; SUBCUTANEOUS
Qty: 0 | Refills: 0 | Status: DISCONTINUED | OUTPATIENT
Start: 2018-09-25 | End: 2018-09-25

## 2018-09-25 RX ORDER — AMIODARONE HYDROCHLORIDE 400 MG/1
150 TABLET ORAL ONCE
Qty: 0 | Refills: 0 | Status: COMPLETED | OUTPATIENT
Start: 2018-09-25 | End: 2018-09-25

## 2018-09-25 RX ORDER — DIGOXIN 250 MCG
0.25 TABLET ORAL ONCE
Qty: 0 | Refills: 0 | Status: COMPLETED | OUTPATIENT
Start: 2018-09-25 | End: 2018-09-25

## 2018-09-25 RX ORDER — IPRATROPIUM/ALBUTEROL SULFATE 18-103MCG
3 AEROSOL WITH ADAPTER (GRAM) INHALATION EVERY 6 HOURS
Qty: 0 | Refills: 0 | Status: DISCONTINUED | OUTPATIENT
Start: 2018-09-25 | End: 2018-09-27

## 2018-09-25 RX ORDER — MAGNESIUM SULFATE 500 MG/ML
2 VIAL (ML) INJECTION ONCE
Qty: 0 | Refills: 0 | Status: DISCONTINUED | OUTPATIENT
Start: 2018-09-25 | End: 2018-09-25

## 2018-09-25 RX ORDER — METOPROLOL TARTRATE 50 MG
5 TABLET ORAL ONCE
Qty: 0 | Refills: 0 | Status: COMPLETED | OUTPATIENT
Start: 2018-09-25 | End: 2018-09-25

## 2018-09-25 RX ORDER — ENOXAPARIN SODIUM 100 MG/ML
70 INJECTION SUBCUTANEOUS EVERY 12 HOURS
Qty: 0 | Refills: 0 | Status: DISCONTINUED | OUTPATIENT
Start: 2018-09-25 | End: 2018-09-27

## 2018-09-25 RX ADMIN — AMIODARONE HYDROCHLORIDE 618 MILLIGRAM(S): 400 TABLET ORAL at 00:45

## 2018-09-25 RX ADMIN — Medication 325 MILLIGRAM(S): at 06:12

## 2018-09-25 RX ADMIN — Medication 0.25 MILLIGRAM(S): at 01:49

## 2018-09-25 RX ADMIN — PANTOPRAZOLE SODIUM 40 MILLIGRAM(S): 20 TABLET, DELAYED RELEASE ORAL at 06:06

## 2018-09-25 RX ADMIN — AMIODARONE HYDROCHLORIDE 400 MILLIGRAM(S): 400 TABLET ORAL at 06:06

## 2018-09-25 RX ADMIN — ENOXAPARIN SODIUM 40 MILLIGRAM(S): 100 INJECTION SUBCUTANEOUS at 12:01

## 2018-09-25 RX ADMIN — BUDESONIDE AND FORMOTEROL FUMARATE DIHYDRATE 2 PUFF(S): 160; 4.5 AEROSOL RESPIRATORY (INHALATION) at 06:13

## 2018-09-25 RX ADMIN — SODIUM CHLORIDE 250 MILLILITER(S): 9 INJECTION INTRAMUSCULAR; INTRAVENOUS; SUBCUTANEOUS at 10:41

## 2018-09-25 RX ADMIN — Medication 0.25 MILLIGRAM(S): at 17:57

## 2018-09-25 RX ADMIN — Medication 50 MILLIGRAM(S): at 06:06

## 2018-09-25 RX ADMIN — Medication 5 MILLIGRAM(S): at 00:34

## 2018-09-25 RX ADMIN — AMIODARONE HYDROCHLORIDE 618 MILLIGRAM(S): 400 TABLET ORAL at 15:47

## 2018-09-25 RX ADMIN — ENOXAPARIN SODIUM 70 MILLIGRAM(S): 100 INJECTION SUBCUTANEOUS at 23:41

## 2018-09-25 RX ADMIN — AMIODARONE HYDROCHLORIDE 400 MILLIGRAM(S): 400 TABLET ORAL at 17:57

## 2018-09-25 RX ADMIN — Medication 0.25 MILLIGRAM(S): at 22:20

## 2018-09-25 NOTE — DIETITIAN INITIAL EVALUATION ADULT. - OTHER INFO
78 year old male admitted from home c SOB.  Pt seen for nutrition assessment for SCU LOS policy.  Cxr noted to show congestion/small effusion, s/p thoracentesis.  Pt also c hx asthma/congenital bollus lung disease as well recent extensive thoracic surgery at [place] earlier this month (bovine AVR, left atrial appendage closure, AAA/aortic arch aneurysm repair).  Noted post op course complicated by afib.  Pt presently on DASH/TLC diet, tolerating well c varied po intake.  Pt states intake depends on meal received.  Discussed ability to make meal changes to optimize po intake; menu left at bedside.  Spouse/pt states he has been following higher protein diet since recent thoracic surgery. Reports ~10lbs wt loss in the past few weeks, likely secondary to catabolism/increased energy needs, intake has generally been fair.  Pt c hypotension this am, s/p fld bolus. Skin intact.  RD to f/u.

## 2018-09-25 NOTE — DIETITIAN INITIAL EVALUATION ADULT. - NS AS NUTRI INTERV MEALS SNACK
General/healthful diet/[place] - modified diet/Fat - modified diet/dash/tlc diet, honor food preferences

## 2018-09-26 DIAGNOSIS — I48.0 PAROXYSMAL ATRIAL FIBRILLATION: ICD-10-CM

## 2018-09-26 DIAGNOSIS — R07.9 CHEST PAIN, UNSPECIFIED: ICD-10-CM

## 2018-09-26 LAB
ALBUMIN SERPL ELPH-MCNC: 2.3 G/DL — LOW (ref 3.3–5)
ALP SERPL-CCNC: 103 U/L — SIGNIFICANT CHANGE UP (ref 30–120)
ALT FLD-CCNC: 57 U/L DA — SIGNIFICANT CHANGE UP (ref 10–60)
ANION GAP SERPL CALC-SCNC: 2 MMOL/L — LOW (ref 5–17)
AST SERPL-CCNC: 18 U/L — SIGNIFICANT CHANGE UP (ref 10–40)
BASOPHILS # BLD AUTO: 0.01 K/UL — SIGNIFICANT CHANGE UP (ref 0–0.2)
BASOPHILS NFR BLD AUTO: 0.1 % — SIGNIFICANT CHANGE UP (ref 0–2)
BILIRUB SERPL-MCNC: 0.4 MG/DL — SIGNIFICANT CHANGE UP (ref 0.2–1.2)
BUN SERPL-MCNC: 25 MG/DL — HIGH (ref 7–23)
CALCIUM SERPL-MCNC: 7.8 MG/DL — LOW (ref 8.4–10.5)
CHLORIDE SERPL-SCNC: 104 MMOL/L — SIGNIFICANT CHANGE UP (ref 96–108)
CO2 SERPL-SCNC: 29 MMOL/L — SIGNIFICANT CHANGE UP (ref 22–31)
CREAT SERPL-MCNC: 1.03 MG/DL — SIGNIFICANT CHANGE UP (ref 0.5–1.3)
EOSINOPHIL # BLD AUTO: 0.04 K/UL — SIGNIFICANT CHANGE UP (ref 0–0.5)
EOSINOPHIL NFR BLD AUTO: 0.5 % — SIGNIFICANT CHANGE UP (ref 0–6)
GLUCOSE SERPL-MCNC: 106 MG/DL — HIGH (ref 70–99)
HCT VFR BLD CALC: 32.2 % — LOW (ref 39–50)
HGB BLD-MCNC: 10.5 G/DL — LOW (ref 13–17)
IMM GRANULOCYTES NFR BLD AUTO: 0.9 % — SIGNIFICANT CHANGE UP (ref 0–1.5)
INR BLD: 1.27 RATIO — HIGH (ref 0.88–1.16)
LYMPHOCYTES # BLD AUTO: 1.11 K/UL — SIGNIFICANT CHANGE UP (ref 1–3.3)
LYMPHOCYTES # BLD AUTO: 13.9 % — SIGNIFICANT CHANGE UP (ref 13–44)
MAGNESIUM SERPL-MCNC: 2.4 MG/DL — SIGNIFICANT CHANGE UP (ref 1.6–2.6)
MCHC RBC-ENTMCNC: 30 PG — SIGNIFICANT CHANGE UP (ref 27–34)
MCHC RBC-ENTMCNC: 32.6 GM/DL — SIGNIFICANT CHANGE UP (ref 32–36)
MCV RBC AUTO: 92 FL — SIGNIFICANT CHANGE UP (ref 80–100)
MONOCYTES # BLD AUTO: 0.81 K/UL — SIGNIFICANT CHANGE UP (ref 0–0.9)
MONOCYTES NFR BLD AUTO: 10.2 % — SIGNIFICANT CHANGE UP (ref 2–14)
NEUTROPHILS # BLD AUTO: 5.94 K/UL — SIGNIFICANT CHANGE UP (ref 1.8–7.4)
NEUTROPHILS NFR BLD AUTO: 74.4 % — SIGNIFICANT CHANGE UP (ref 43–77)
NON-GYN CYTOLOGY SPEC: SIGNIFICANT CHANGE UP
NRBC # BLD: 0 /100 WBCS — SIGNIFICANT CHANGE UP (ref 0–0)
PLATELET # BLD AUTO: 715 K/UL — HIGH (ref 150–400)
POTASSIUM SERPL-MCNC: 4.2 MMOL/L — SIGNIFICANT CHANGE UP (ref 3.5–5.3)
POTASSIUM SERPL-SCNC: 4.2 MMOL/L — SIGNIFICANT CHANGE UP (ref 3.5–5.3)
PROCALCITONIN SERPL-MCNC: 0.02 NG/ML — SIGNIFICANT CHANGE UP (ref 0.02–0.1)
PROT SERPL-MCNC: 6.1 G/DL — SIGNIFICANT CHANGE UP (ref 6–8.3)
PROTHROM AB SERPL-ACNC: 13.9 SEC — HIGH (ref 9.8–12.7)
RBC # BLD: 3.5 M/UL — LOW (ref 4.2–5.8)
RBC # FLD: 13.9 % — SIGNIFICANT CHANGE UP (ref 10.3–14.5)
SODIUM SERPL-SCNC: 135 MMOL/L — SIGNIFICANT CHANGE UP (ref 135–145)
WBC # BLD: 7.98 K/UL — SIGNIFICANT CHANGE UP (ref 3.8–10.5)
WBC # FLD AUTO: 7.98 K/UL — SIGNIFICANT CHANGE UP (ref 3.8–10.5)

## 2018-09-26 PROCEDURE — 92960 CARDIOVERSION ELECTRIC EXT: CPT

## 2018-09-26 PROCEDURE — 71045 X-RAY EXAM CHEST 1 VIEW: CPT | Mod: 26

## 2018-09-26 PROCEDURE — 99233 SBSQ HOSP IP/OBS HIGH 50: CPT | Mod: 25

## 2018-09-26 PROCEDURE — 93010 ELECTROCARDIOGRAM REPORT: CPT

## 2018-09-26 PROCEDURE — 99233 SBSQ HOSP IP/OBS HIGH 50: CPT

## 2018-09-26 RX ORDER — DOCUSATE SODIUM 100 MG
100 CAPSULE ORAL THREE TIMES A DAY
Qty: 0 | Refills: 0 | Status: DISCONTINUED | OUTPATIENT
Start: 2018-09-26 | End: 2018-09-27

## 2018-09-26 RX ORDER — WARFARIN SODIUM 2.5 MG/1
5 TABLET ORAL DAILY
Qty: 0 | Refills: 0 | Status: DISCONTINUED | OUTPATIENT
Start: 2018-09-26 | End: 2018-09-27

## 2018-09-26 RX ORDER — METOPROLOL TARTRATE 50 MG
75 TABLET ORAL DAILY
Qty: 0 | Refills: 0 | Status: DISCONTINUED | OUTPATIENT
Start: 2018-09-26 | End: 2018-09-27

## 2018-09-26 RX ORDER — ALPRAZOLAM 0.25 MG
0.25 TABLET ORAL ONCE
Qty: 0 | Refills: 0 | Status: DISCONTINUED | OUTPATIENT
Start: 2018-09-26 | End: 2018-09-26

## 2018-09-26 RX ORDER — ATORVASTATIN CALCIUM 80 MG/1
10 TABLET, FILM COATED ORAL AT BEDTIME
Qty: 0 | Refills: 0 | Status: DISCONTINUED | OUTPATIENT
Start: 2018-09-26 | End: 2018-09-27

## 2018-09-26 RX ADMIN — ENOXAPARIN SODIUM 70 MILLIGRAM(S): 100 INJECTION SUBCUTANEOUS at 12:08

## 2018-09-26 RX ADMIN — Medication 100 MILLIGRAM(S): at 22:14

## 2018-09-26 RX ADMIN — WARFARIN SODIUM 5 MILLIGRAM(S): 2.5 TABLET ORAL at 22:14

## 2018-09-26 RX ADMIN — PANTOPRAZOLE SODIUM 40 MILLIGRAM(S): 20 TABLET, DELAYED RELEASE ORAL at 06:25

## 2018-09-26 RX ADMIN — AMIODARONE HYDROCHLORIDE 400 MILLIGRAM(S): 400 TABLET ORAL at 17:22

## 2018-09-26 RX ADMIN — AMIODARONE HYDROCHLORIDE 400 MILLIGRAM(S): 400 TABLET ORAL at 06:24

## 2018-09-26 RX ADMIN — Medication 0.25 MILLIGRAM(S): at 23:20

## 2018-09-26 RX ADMIN — Medication 50 MILLIGRAM(S): at 06:25

## 2018-09-26 RX ADMIN — Medication 325 MILLIGRAM(S): at 06:28

## 2018-09-26 RX ADMIN — ENOXAPARIN SODIUM 70 MILLIGRAM(S): 100 INJECTION SUBCUTANEOUS at 23:21

## 2018-09-27 ENCOUNTER — TRANSCRIPTION ENCOUNTER (OUTPATIENT)
Age: 78
End: 2018-09-27

## 2018-09-27 VITALS — WEIGHT: 160.94 LBS

## 2018-09-27 LAB
ALBUMIN SERPL ELPH-MCNC: 2.5 G/DL — LOW (ref 3.3–5)
ALP SERPL-CCNC: 105 U/L — SIGNIFICANT CHANGE UP (ref 30–120)
ALT FLD-CCNC: 49 U/L DA — SIGNIFICANT CHANGE UP (ref 10–60)
ANION GAP SERPL CALC-SCNC: 5 MMOL/L — SIGNIFICANT CHANGE UP (ref 5–17)
APTT BLD: 32.1 SEC — SIGNIFICANT CHANGE UP (ref 27.5–37.4)
AST SERPL-CCNC: 16 U/L — SIGNIFICANT CHANGE UP (ref 10–40)
BASOPHILS # BLD AUTO: 0.03 K/UL — SIGNIFICANT CHANGE UP (ref 0–0.2)
BASOPHILS NFR BLD AUTO: 0.4 % — SIGNIFICANT CHANGE UP (ref 0–2)
BILIRUB SERPL-MCNC: 0.5 MG/DL — SIGNIFICANT CHANGE UP (ref 0.2–1.2)
BUN SERPL-MCNC: 20 MG/DL — SIGNIFICANT CHANGE UP (ref 7–23)
CALCIUM SERPL-MCNC: 8.5 MG/DL — SIGNIFICANT CHANGE UP (ref 8.4–10.5)
CHLORIDE SERPL-SCNC: 105 MMOL/L — SIGNIFICANT CHANGE UP (ref 96–108)
CO2 SERPL-SCNC: 27 MMOL/L — SIGNIFICANT CHANGE UP (ref 22–31)
CREAT SERPL-MCNC: 1.08 MG/DL — SIGNIFICANT CHANGE UP (ref 0.5–1.3)
EOSINOPHIL # BLD AUTO: 0.07 K/UL — SIGNIFICANT CHANGE UP (ref 0–0.5)
EOSINOPHIL NFR BLD AUTO: 0.9 % — SIGNIFICANT CHANGE UP (ref 0–6)
GLUCOSE SERPL-MCNC: 95 MG/DL — SIGNIFICANT CHANGE UP (ref 70–99)
HCT VFR BLD CALC: 34.1 % — LOW (ref 39–50)
HGB BLD-MCNC: 11.3 G/DL — LOW (ref 13–17)
IMM GRANULOCYTES NFR BLD AUTO: 0.9 % — SIGNIFICANT CHANGE UP (ref 0–1.5)
INR BLD: 1.14 RATIO — SIGNIFICANT CHANGE UP (ref 0.88–1.16)
LYMPHOCYTES # BLD AUTO: 1.23 K/UL — SIGNIFICANT CHANGE UP (ref 1–3.3)
LYMPHOCYTES # BLD AUTO: 16.4 % — SIGNIFICANT CHANGE UP (ref 13–44)
MCHC RBC-ENTMCNC: 30 PG — SIGNIFICANT CHANGE UP (ref 27–34)
MCHC RBC-ENTMCNC: 33.1 GM/DL — SIGNIFICANT CHANGE UP (ref 32–36)
MCV RBC AUTO: 90.5 FL — SIGNIFICANT CHANGE UP (ref 80–100)
MONOCYTES # BLD AUTO: 0.68 K/UL — SIGNIFICANT CHANGE UP (ref 0–0.9)
MONOCYTES NFR BLD AUTO: 9 % — SIGNIFICANT CHANGE UP (ref 2–14)
NEUTROPHILS # BLD AUTO: 5.44 K/UL — SIGNIFICANT CHANGE UP (ref 1.8–7.4)
NEUTROPHILS NFR BLD AUTO: 72.4 % — SIGNIFICANT CHANGE UP (ref 43–77)
NRBC # BLD: 0 /100 WBCS — SIGNIFICANT CHANGE UP (ref 0–0)
PLATELET # BLD AUTO: 762 K/UL — HIGH (ref 150–400)
POTASSIUM SERPL-MCNC: 5.2 MMOL/L — SIGNIFICANT CHANGE UP (ref 3.5–5.3)
POTASSIUM SERPL-SCNC: 5.2 MMOL/L — SIGNIFICANT CHANGE UP (ref 3.5–5.3)
PROT SERPL-MCNC: 5.8 G/DL — LOW (ref 6–8.3)
PROTHROM AB SERPL-ACNC: 12.5 SEC — SIGNIFICANT CHANGE UP (ref 9.8–12.7)
RBC # BLD: 3.77 M/UL — LOW (ref 4.2–5.8)
RBC # FLD: 13.6 % — SIGNIFICANT CHANGE UP (ref 10.3–14.5)
SODIUM SERPL-SCNC: 137 MMOL/L — SIGNIFICANT CHANGE UP (ref 135–145)
WBC # BLD: 7.52 K/UL — SIGNIFICANT CHANGE UP (ref 3.8–10.5)
WBC # FLD AUTO: 7.52 K/UL — SIGNIFICANT CHANGE UP (ref 3.8–10.5)

## 2018-09-27 PROCEDURE — 80048 BASIC METABOLIC PNL TOTAL CA: CPT

## 2018-09-27 PROCEDURE — 88305 TISSUE EXAM BY PATHOLOGIST: CPT

## 2018-09-27 PROCEDURE — 87075 CULTR BACTERIA EXCEPT BLOOD: CPT

## 2018-09-27 PROCEDURE — 76604 US EXAM CHEST: CPT

## 2018-09-27 PROCEDURE — 88108 CYTOPATH CONCENTRATE TECH: CPT

## 2018-09-27 PROCEDURE — 36415 COLL VENOUS BLD VENIPUNCTURE: CPT

## 2018-09-27 PROCEDURE — 71045 X-RAY EXAM CHEST 1 VIEW: CPT

## 2018-09-27 PROCEDURE — 99239 HOSP IP/OBS DSCHRG MGMT >30: CPT

## 2018-09-27 PROCEDURE — 87070 CULTURE OTHR SPECIMN AEROBIC: CPT

## 2018-09-27 PROCEDURE — 87205 SMEAR GRAM STAIN: CPT

## 2018-09-27 PROCEDURE — 93970 EXTREMITY STUDY: CPT

## 2018-09-27 PROCEDURE — 94760 N-INVAS EAR/PLS OXIMETRY 1: CPT

## 2018-09-27 PROCEDURE — 96374 THER/PROPH/DIAG INJ IV PUSH: CPT

## 2018-09-27 PROCEDURE — 89051 BODY FLUID CELL COUNT: CPT

## 2018-09-27 PROCEDURE — C1729: CPT

## 2018-09-27 PROCEDURE — 85379 FIBRIN DEGRADATION QUANT: CPT

## 2018-09-27 PROCEDURE — 85730 THROMBOPLASTIN TIME PARTIAL: CPT

## 2018-09-27 PROCEDURE — 84145 PROCALCITONIN (PCT): CPT

## 2018-09-27 PROCEDURE — 93312 ECHO TRANSESOPHAGEAL: CPT

## 2018-09-27 PROCEDURE — 84157 ASSAY OF PROTEIN OTHER: CPT

## 2018-09-27 PROCEDURE — 83880 ASSAY OF NATRIURETIC PEPTIDE: CPT

## 2018-09-27 PROCEDURE — 85027 COMPLETE CBC AUTOMATED: CPT

## 2018-09-27 PROCEDURE — 85652 RBC SED RATE AUTOMATED: CPT

## 2018-09-27 PROCEDURE — 81003 URINALYSIS AUTO W/O SCOPE: CPT

## 2018-09-27 PROCEDURE — 86140 C-REACTIVE PROTEIN: CPT

## 2018-09-27 PROCEDURE — 93005 ELECTROCARDIOGRAM TRACING: CPT

## 2018-09-27 PROCEDURE — 84484 ASSAY OF TROPONIN QUANT: CPT

## 2018-09-27 PROCEDURE — 93325 DOPPLER ECHO COLOR FLOW MAPG: CPT

## 2018-09-27 PROCEDURE — 94640 AIRWAY INHALATION TREATMENT: CPT

## 2018-09-27 PROCEDURE — 92960 CARDIOVERSION ELECTRIC EXT: CPT

## 2018-09-27 PROCEDURE — 90686 IIV4 VACC NO PRSV 0.5 ML IM: CPT

## 2018-09-27 PROCEDURE — 93306 TTE W/DOPPLER COMPLETE: CPT

## 2018-09-27 PROCEDURE — 82553 CREATINE MB FRACTION: CPT

## 2018-09-27 PROCEDURE — 84100 ASSAY OF PHOSPHORUS: CPT

## 2018-09-27 PROCEDURE — 83735 ASSAY OF MAGNESIUM: CPT

## 2018-09-27 PROCEDURE — 80053 COMPREHEN METABOLIC PANEL: CPT

## 2018-09-27 PROCEDURE — 32555 ASPIRATE PLEURA W/ IMAGING: CPT

## 2018-09-27 PROCEDURE — 87116 MYCOBACTERIA CULTURE: CPT

## 2018-09-27 PROCEDURE — 99285 EMERGENCY DEPT VISIT HI MDM: CPT | Mod: 25

## 2018-09-27 PROCEDURE — 82042 OTHER SOURCE ALBUMIN QUAN EA: CPT

## 2018-09-27 PROCEDURE — 83986 ASSAY PH BODY FLUID NOS: CPT

## 2018-09-27 PROCEDURE — 85610 PROTHROMBIN TIME: CPT

## 2018-09-27 PROCEDURE — 82550 ASSAY OF CK (CPK): CPT

## 2018-09-27 PROCEDURE — 82945 GLUCOSE OTHER FLUID: CPT

## 2018-09-27 PROCEDURE — 83615 LACTATE (LD) (LDH) ENZYME: CPT

## 2018-09-27 PROCEDURE — 87015 SPECIMEN INFECT AGNT CONCNTJ: CPT

## 2018-09-27 PROCEDURE — 87206 SMEAR FLUORESCENT/ACID STAI: CPT

## 2018-09-27 PROCEDURE — 93320 DOPPLER ECHO COMPLETE: CPT

## 2018-09-27 RX ORDER — METOPROLOL TARTRATE 50 MG
1 TABLET ORAL
Qty: 60 | Refills: 0
Start: 2018-09-27 | End: 2018-10-26

## 2018-09-27 RX ORDER — ATORVASTATIN CALCIUM 80 MG/1
1 TABLET, FILM COATED ORAL
Qty: 30 | Refills: 0
Start: 2018-09-27 | End: 2018-10-26

## 2018-09-27 RX ORDER — AMIODARONE HYDROCHLORIDE 400 MG/1
1 TABLET ORAL
Qty: 60 | Refills: 0
Start: 2018-09-27

## 2018-09-27 RX ORDER — LOSARTAN POTASSIUM 100 MG/1
1 TABLET, FILM COATED ORAL
Qty: 0 | Refills: 0 | COMMUNITY

## 2018-09-27 RX ORDER — ATORVASTATIN CALCIUM 80 MG/1
1 TABLET, FILM COATED ORAL
Qty: 0 | Refills: 0 | COMMUNITY

## 2018-09-27 RX ORDER — AMIODARONE HYDROCHLORIDE 400 MG/1
200 TABLET ORAL
Qty: 0 | Refills: 0 | Status: DISCONTINUED | OUTPATIENT
Start: 2018-09-27 | End: 2018-09-27

## 2018-09-27 RX ORDER — AMLODIPINE BESYLATE 2.5 MG/1
1 TABLET ORAL
Qty: 0 | Refills: 0 | COMMUNITY

## 2018-09-27 RX ORDER — ASPIRIN/CALCIUM CARB/MAGNESIUM 324 MG
1 TABLET ORAL
Qty: 0 | Refills: 0 | COMMUNITY

## 2018-09-27 RX ORDER — AMIODARONE HYDROCHLORIDE 400 MG/1
1 TABLET ORAL
Qty: 0 | Refills: 0 | COMMUNITY

## 2018-09-27 RX ORDER — ASPIRIN/CALCIUM CARB/MAGNESIUM 324 MG
1 TABLET ORAL
Qty: 30 | Refills: 0
Start: 2018-09-27 | End: 2018-10-26

## 2018-09-27 RX ORDER — WARFARIN SODIUM 2.5 MG/1
1 TABLET ORAL
Qty: 30 | Refills: 0
Start: 2018-09-27

## 2018-09-27 RX ORDER — ATORVASTATIN CALCIUM 80 MG/1
1 TABLET, FILM COATED ORAL
Qty: 30 | Refills: 0 | DISCHARGE
Start: 2018-09-27

## 2018-09-27 RX ORDER — METOPROLOL TARTRATE 50 MG
2 TABLET ORAL
Qty: 0 | Refills: 0 | COMMUNITY

## 2018-09-27 RX ORDER — LOSARTAN POTASSIUM 100 MG/1
1 TABLET, FILM COATED ORAL
Qty: 30 | Refills: 0
Start: 2018-09-27 | End: 2018-10-26

## 2018-09-27 RX ADMIN — INFLUENZA VIRUS VACCINE 0.5 MILLILITER(S): 15; 15; 15; 15 SUSPENSION INTRAMUSCULAR at 11:15

## 2018-09-27 RX ADMIN — AMIODARONE HYDROCHLORIDE 400 MILLIGRAM(S): 400 TABLET ORAL at 06:06

## 2018-09-27 RX ADMIN — Medication 100 MILLIGRAM(S): at 06:06

## 2018-09-27 RX ADMIN — Medication 75 MILLIGRAM(S): at 06:06

## 2018-09-27 RX ADMIN — Medication 325 MILLIGRAM(S): at 06:06

## 2018-09-27 RX ADMIN — ENOXAPARIN SODIUM 70 MILLIGRAM(S): 100 INJECTION SUBCUTANEOUS at 11:16

## 2018-09-27 NOTE — DISCHARGE NOTE ADULT - SECONDARY DIAGNOSIS.
Essential hypertension HLD (hyperlipidemia) Pleural effusion Thoracic aortic aneurysm without rupture

## 2018-09-27 NOTE — DISCHARGE NOTE ADULT - PLAN OF CARE
Meds/INR monitoring as per [facility] Lopez/[facility]. Blood pressure meds with hold parameters Lipitor 10 Right thoracentesis 560cc serosanguinous fluid removed 9/24 s/p thoracic surgery 9/24 avoid recurrence

## 2018-09-27 NOTE — PROGRESS NOTE ADULT - PROBLEM SELECTOR PLAN 6
Can d/c inhalers and avoid beta stimulation  PFT as outpt
Can d/c inhalers and avoid beta stimulation
Can d/c inhalers and avoid beta stimulation

## 2018-09-27 NOTE — PROGRESS NOTE ADULT - PROBLEM SELECTOR PROBLEM 2
Dyspnea and respiratory abnormalities
Thoracic aortic aneurysm without rupture
Acute systolic congestive heart failure
Chest pain, unspecified type
Dyspnea and respiratory abnormalities
Dyspnea and respiratory abnormalities
Paroxysmal atrial fibrillation

## 2018-09-27 NOTE — PROGRESS NOTE ADULT - PROBLEM SELECTOR PLAN 4
Continue current medications.
observe
IR thoracentesis right
observe
observe
symbicort and hung   -curently on room air

## 2018-09-27 NOTE — PROGRESS NOTE ADULT - SUBJECTIVE AND OBJECTIVE BOX
PULMONARY/CRITICAL CARE      INTERVAL HPI/OVERNIGHT EVENTS:    78y MaleHPI: Pt cardioverted successfully. Remains in NSR. No sob, cp. Alert, nad.  78m s/p recent extensive thoracic surgery at [facility] on 9/12 by Dr. 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 Post-op course complicated by A.fib and CHF requiring initiation of Amiodarone and Lasix. No Lasix prescribed on discharge, now p/w SOB.   CXR shows congestion and small effusions.  In ER, given Lasix 40 iv, Solumedrol and nebs.  Seen by cardio/pulm.  Elevated d-dimer - CT angio pending. (23 Sep 2018 13:04)        PAST MEDICAL & SURGICAL HISTORY:  UTI (urinary tract infection)  Sepsis due to Escherichia coli  Thoracic aortic aneurysm without rupture  HLD (hyperlipidemia)  BPH (Benign Prostatic Hyperplasia)  HTN (hypertension)  AF (atrial fibrillation)  Atrial fibrillation status post cardioversion  S/p tibial fracture        ICU Vital Signs Last 24 Hrs  T(C): 36.7 (27 Sep 2018 00:18), Max: 36.7 (27 Sep 2018 00:18)  T(F): 98.1 (27 Sep 2018 00:18), Max: 98.1 (27 Sep 2018 00:18)  HR: 67 (27 Sep 2018 06:00) (62 - 665)  BP: 130/69 (27 Sep 2018 06:00) (86/53 - 144/87)  BP(mean): 87 (27 Sep 2018 06:00) (66 - 104)  ABP: --  ABP(mean): --  RR: 28 (27 Sep 2018 06:00) (16 - 28)  SpO2: 92% (27 Sep 2018 06:00) (92% - 100%)    Qtts:     I&O's Summary    25 Sep 2018 07:01  -  26 Sep 2018 07:00  --------------------------------------------------------  IN: 930 mL / OUT: 1800 mL / NET: -870 mL    26 Sep 2018 07:01  -  27 Sep 2018 06:33  --------------------------------------------------------  IN: 510 mL / OUT: 1275 mL / NET: -765 mL          REVIEW OF SYSTEMS:    CONSTITUTIONAL: No fever, weight loss, or fatigue  EYES: No eye pain, visual disturbances, or discharge  ENMT:  No difficulty hearing, tinnitus, vertigo; No sinus or throat pain  RESPIRATORY: No cough, wheezing, chills or hemoptysis; less shortness of breath  CARDIOVASCULAR: No chest pain, palpitations, dizziness, or leg swelling  GASTROINTESTINAL: No abdominal or epigastric pain. No nausea, vomiting, or hematemesis; No diarrhea or constipation. No melena or hematochezia.  NEUROLOGICAL: No headaches, memory loss, loss of strength, numbness, or tremors  SKIN: No itching, burning, rashes, or lesions       PHYSICAL EXAM:    GENERAL: NAD, well-groomed, well-developed, NAD  HEAD:  Atraumatic, Normocephalic  EYES: EOMI, PERRLA, conjunctiva and sclera clear  ENMT: No tonsillar erythema, exudates, or enlargement; Moist mucous membranes, Good dentition, No lesions  NECK: Supple, No JVD, Normal thyroid  NERVOUS SYSTEM:  Alert & Oriented X3, Good concentration; Motor Strength 5/5 B/L upper and lower extremities  CHEST/LUNG: Few crackles left base. Decreased bs, decreased fremitus, bs left base.  HEART: In regular rate and rhythm; No murmurs, rubs, or gallops  ABDOMEN: Soft, Nontender, Nondistended; Bowel sounds present  EXTREMITIES:  2+ Peripheral Pulses, No clubbing, cyanosis, or edema  LYMPH: No lymphadenopathy noted  SKIN: No rashes or lesions          LABS:                        10.5   7.98  )-----------( 715      ( 26 Sep 2018 10:00 )             32.2     09-26    135  |  104  |  25<H>  ----------------------------<  106<H>  4.2   |  29  |  1.03    Ca    7.8<L>      26 Sep 2018 05:48  Phos  3.6     09-25  Mg     2.4     09-26    TPro  6.1  /  Alb  2.3<L>  /  TBili  0.4  /  DBili  x   /  AST  18  /  ALT  57  /  AlkPhos  103  09-26    PT/INR - ( 26 Sep 2018 17:20 )   PT: 13.9 sec;   INR: 1.27 ratio               vanco through     RADIOLOGY & ADDITIONAL STUDIES:< from: Xray Chest 1 View- PORTABLE-Routine (09.26.18 @ 05:34) >  EXAM:  XR CHEST PORTABLE ROUTINE 1V                                  PROCEDURE DATE:  09/26/2018          INTERPRETATION:  EXAM: PORTABLE CHEST.     CLINICAL INDICATION: Shortness of breath.     TECHNIQUE: AP view.     PRIOR EXAM: 09/25/2018.     FINDINGS:      A patchy right basilar infiltrate has improved. Blunted left lateral   costophrenic sulcus may be related to a small pleural effusion or   scarring. There is evidence of calcified pleural plaques on the left side.    Cardiac and mediastinal silhouette is magnified but stable. Sternotomy   wires and a left atrial appendage clipping is redemonstrated.      IMPRESSION:     Improving right basilar infiltrate. No other significant change as   described above.            < end of copied text >        CRITICAL CARE TIME SPENT:
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  He returned home following open heart surgery on Friday (9/21); experienced 2 days of mild shortness of breath that worsens with the supine position and is associated with mildly decreased SPO2.  No relief with albuterol or Symbicort.  No associated edema, palpitations, angina.  9/24/18: Improved. Less SOB. No chest pain. NSR.   9/25/18  events noted , patient developed  atrial fibrillation with mild rapid ventricular rate , patient feels palpitations ,  patients breathing improved significantly  after thoracentesis , CXR showed atelectasis ,   9/26/18  Patient was remained in atrial fibrillation ,  over night , scheduled for external cardioversion , patient  underwent external  cardioversion  120 joules  converted rhythm to sinus  ,       PAST MEDICAL & SURGICAL HISTORY:  UTI (urinary tract infection)  Sepsis due to Escherichia coli  Thoracic aortic aneurysm without rupture  HLD (hyperlipidemia)  BPH (Benign Prostatic Hyperplasia)  HTN (hypertension)  AF (atrial fibrillation)  Atrial fibrillation status post cardioversion  S/p tibial fracture    MEDICATIONS  (STANDING):  amiodarone    Tablet 400 milliGRAM(s) Oral two times a day  aspirin 325 milliGRAM(s) Oral daily  atorvastatin 10 milliGRAM(s) Oral at bedtime  enoxaparin Injectable 70 milliGRAM(s) SubCutaneous every 12 hours  influenza   Vaccine 0.5 milliLiter(s) IntraMuscular once  metoprolol succinate ER 50 milliGRAM(s) Oral daily    MEDICATIONS  (PRN):  acetaminophen   Tablet .. 650 milliGRAM(s) Oral every 6 hours PRN Temp greater or equal to 38C (100.4F), Moderate Pain (4 - 6)  ALBUTerol/ipratropium for Nebulization 3 milliLiter(s) Nebulizer every 6 hours PRN Wheezing  magnesium hydroxide Suspension 30 milliLiter(s) Oral daily PRN Constipation      Vital Signs Last 24 Hrs  T(C): 36.6 (26 Sep 2018 12:05), Max: 36.6 (26 Sep 2018 00:12)  T(F): 97.8 (26 Sep 2018 12:05), Max: 97.8 (26 Sep 2018 00:12)  HR: 86 (26 Sep 2018 12:00) (85 - 105)  BP: 108/78 (26 Sep 2018 12:00) (88/70 - 140/73)  BP(mean): 87 (26 Sep 2018 12:00) (77 - 94)  RR: 17 (26 Sep 2018 12:00) (11 - 26)  SpO2: 93% (26 Sep 2018 12:00) (93% - 97%)    I&O's Summary    25 Sep 2018 07:01  -  26 Sep 2018 07:00  --------------------------------------------------------  IN: 930 mL / OUT: 1800 mL / NET: -870 mL    26 Sep 2018 07:01  -  26 Sep 2018 13:16  --------------------------------------------------------  IN: 0 mL / OUT: 200 mL / NET: -200 mL          PHYSICAL EXAM:    Constitutional: NAD, awake and alert, well-developed  HEENT: PERR, EOMI,  No oral cyananosis.  Neck:  supple,  No JVD  Respiratory: Breath sounds are clear bilaterally, No wheezing, rales or rhonchi  Cardiovascular: S1 and S2, irregular mild tachycardia  Post op sternotomy  Gastrointestinal: Bowel Sounds present, soft, nontender.   Extremities: No peripheral edema. No clubbing or cyanosis.  Vascular: 2+ peripheral pulses  Neurological: A/O x 3, no focal deficits  Musculoskeletal: no calf tenderness.  Skin: No rashes.      LABS: All Labs Reviewed:                             10.5 7.98  )-----------( 715      ( 26 Sep 2018 10:00 )             32.2     09-26    135  |  104  |  25<H>  ----------------------------<  106<H>  4.2   |  29  |  1.03    Ca    7.8<L>      26 Sep 2018 05:48  Phos  3.6     09-25  Mg     2.4     09-26    TPro  6.1  /  Alb  2.3<L>  /  TBili  0.4  /  DBili  x   /  AST  18  /  ALT  57  /  AlkPhos  103  09-26    CARDIAC MARKERS ( 25 Sep 2018 21:24 )  .139 ng/mL / x     / 22 U/L / x     / 0.6 ng/mL  CARDIAC MARKERS ( 25 Sep 2018 05:30 )  .159 ng/mL / x     / 37 U/L / x     / 0.5 ng/mL      LIVER FUNCTIONS - ( 26 Sep 2018 05:48 )  Alb: 2.3 g/dL / Pro: 6.1 g/dL / ALK PHOS: 103 U/L / ALT: 57 U/L DA / AST: 18 U/L / GGT: x                       RADIOLOGY/EKG:      ECHO/CARDIAC CATHTERIZATION/STRESS TEST:< from: US Transthoracic Echocardiogram w/Doppler Complete (09.23.18 @ 12:00) >  CONCLUSIONS:  1. Normal left ventricular size and function. LVEF estimated at 70%.  2. Grossly normal right ventricular size and function.  3. Mild left atrial enlargement.  4. Bioprosthetic aortic valve present.  The peak transaortic velocity is   approximately 1.6 m/s, which is normal in the setting of a prosthetic   valve.  5. Minimal pericardial effusion.                    CHANTAL ANTON M.D., ATTENDING CARDIOLOGIST  This document has been electronically signed. Sep 23 2018 12:51PM      < from: 12 Lead ECG (09.23.18 @ 10:26) >  inus rhythm with premature atrial complexes  Low voltage QRS  ST & T wave abnormality, consider anterior ischemia  Prolonged QT  Abnormal ECG    < end of copied text >            Post cardioversion   Normal sinus 68  low qrs voltage , non specific  T wave inversion V1 to V3   M sec     < end of copied text >
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  He returned home following open heart surgery on Friday (9/21); experienced 2 days of mild shortness of breath that worsens with the supine position and is associated with mildly decreased SPO2.  No relief with albuterol or Symbicort.  No associated edema, palpitations, angina.  9/24/18: Improved. Less SOB. No chest pain. NSR.   9/25/18  events noted , patient developed  atrial fibrillation with mild rapid ventricular rate , patient feels palpitations ,  patients breathing improved significantly  after thoracentesis , CXR showed atelectasis ,       PAST MEDICAL & SURGICAL HISTORY:  UTI (urinary tract infection)  Sepsis due to Escherichia coli  Thoracic aortic aneurysm without rupture  HLD (hyperlipidemia)  BPH (Benign Prostatic Hyperplasia)  HTN (hypertension)  AF (atrial fibrillation)  Atrial fibrillation status post cardioversion  S/p tibial fracture      MEDICATIONS  (STANDING):  amiodarone    Tablet 400 milliGRAM(s) Oral two times a day  aspirin 325 milliGRAM(s) Oral daily  enoxaparin Injectable 40 milliGRAM(s) SubCutaneous daily  influenza   Vaccine 0.5 milliLiter(s) IntraMuscular once  metoprolol succinate ER 50 milliGRAM(s) Oral daily  pantoprazole    Tablet 40 milliGRAM(s) Oral before breakfast  sodium chloride 0.9%. 1000 milliLiter(s) (100 mL/Hr) IV Continuous <Continuous>    MEDICATIONS  (PRN):  acetaminophen   Tablet .. 650 milliGRAM(s) Oral every 6 hours PRN Temp greater or equal to 38C (100.4F), Moderate Pain (4 - 6)  ALBUTerol/ipratropium for Nebulization 3 milliLiter(s) Nebulizer every 6 hours PRN Wheezing  magnesium hydroxide Suspension 30 milliLiter(s) Oral daily PRN Constipation      Vital Signs Last 24 Hrs  T(C): 36.7 (25 Sep 2018 12:46), Max: 36.7 (25 Sep 2018 12:46)  T(F): 98.1 (25 Sep 2018 12:46), Max: 98.1 (25 Sep 2018 12:46)  HR: 105 (25 Sep 2018 15:11) (70 - 105)  BP: 104/71 (25 Sep 2018 15:11) (76/61 - 131/65)  BP(mean): 81 (25 Sep 2018 15:11) (70 - 88)  RR: 23 (25 Sep 2018 06:00) (15 - 30)  SpO2: 95% (25 Sep 2018 06:00) (88% - 100%)    I&O's Summary    24 Sep 2018 07:01  -  25 Sep 2018 07:00  --------------------------------------------------------  IN: 670 mL / OUT: 1320 mL / NET: -650 mL    25 Sep 2018 07:01  -  25 Sep 2018 15:20  --------------------------------------------------------  IN: 250 mL / OUT: 0 mL / NET: 250 mL          PHYSICAL EXAM:    Constitutional: NAD, awake and alert, well-developed  HEENT: PERR, EOMI,  No oral cyananosis.  Neck:  supple,  No JVD  Respiratory: Breath sounds are clear bilaterally, No wheezing, rales or rhonchi  Cardiovascular: S1 and S2, irregular mild tachycardia  Post op sternotomy  Gastrointestinal: Bowel Sounds present, soft, nontender.   Extremities: No peripheral edema. No clubbing or cyanosis.  Vascular: 2+ peripheral pulses  Neurological: A/O x 3, no focal deficits  Musculoskeletal: no calf tenderness.  Skin: No rashes.      LABS: All Labs Reviewed:                            10.1   16.70 )-----------( 548      ( 25 Sep 2018 05:30 )             30.4     09-25    133<L>  |  99  |  29<H>  ----------------------------<  144<H>  4.2   |  29  |  1.23    Ca    8.0<L>      25 Sep 2018 05:30  Mg     2.6     09-25    TPro  6.3  /  Alb  2.3<L>  /  TBili  0.4  /  DBili  x   /  AST  21  /  ALT  78<H>  /  AlkPhos  107  09-25    CARDIAC MARKERS ( 25 Sep 2018 05:30 )  .159 ng/mL / x     / 37 U/L / x     / 0.5 ng/mL  CARDIAC MARKERS ( 24 Sep 2018 00:03 )  .163 ng/mL / x     / x     / x     / x      CARDIAC MARKERS ( 23 Sep 2018 16:07 )  .187 ng/mL / x     / 27 U/L / x     / x          LIVER FUNCTIONS - ( 25 Sep 2018 05:30 )  Alb: 2.3 g/dL / Pro: 6.3 g/dL / ALK PHOS: 107 U/L / ALT: 78 U/L DA / AST: 21 U/L / GGT: x                   RADIOLOGY/EKG:      ECHO/CARDIAC CATHTERIZATION/STRESS TEST:< from: US Transthoracic Echocardiogram w/Doppler Complete (09.23.18 @ 12:00) >  CONCLUSIONS:  1. Normal left ventricular size and function. LVEF estimated at 70%.  2. Grossly normal right ventricular size and function.  3. Mild left atrial enlargement.  4. Bioprosthetic aortic valve present.  The peak transaortic velocity is   approximately 1.6 m/s, which is normal in the setting of a prosthetic   valve.  5. Minimal pericardial effusion.                    CHANTAL ANTON M.D., ATTENDING CARDIOLOGIST  This document has been electronically signed. Sep 23 2018 12:51PM      < from: 12 Lead ECG (09.23.18 @ 10:26) >  inus rhythm with premature atrial complexes  Low voltage QRS  ST & T wave abnormality, consider anterior ischemia  Prolonged QT  Abnormal ECG    < end of copied text >            < end of copied text >
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  He returned home following open heart surgery on Friday (); experienced 2 days of mild shortness of breath that worsens with the supine position and is associated with mildly decreased SPO2.  No relief with albuterol or Symbicort.  No associated edema, palpitations, angina.  18: Improved. Less SOB. No chest pain. NSR.       PAST MEDICAL & SURGICAL HISTORY:  UTI (urinary tract infection)  Sepsis due to Escherichia coli  Thoracic aortic aneurysm without rupture  HLD (hyperlipidemia)  BPH (Benign Prostatic Hyperplasia)  HTN (hypertension)  AF (atrial fibrillation)  Atrial fibrillation status post cardioversion  S/p tibial fracture      SOCIAL HISTORY:    FAMILY HISTORY:  No pertinent family history in first degree relatives      ALLERGIES:  Allergies    No Known Allergies    Intolerances        MEDICATIONS:    MEDICATIONS  (STANDING):  ALBUTerol/ipratropium for Nebulization 3 milliLiter(s) Nebulizer every 6 hours  amiodarone    Tablet 400 milliGRAM(s) Oral two times a day  amLODIPine   Tablet 10 milliGRAM(s) Oral daily  aspirin 325 milliGRAM(s) Oral daily  buDESOnide 160 MICROgram(s)/formoterol 4.5 MICROgram(s) Inhaler 2 Puff(s) Inhalation two times a day  enoxaparin Injectable 40 milliGRAM(s) SubCutaneous daily  furosemide   Injectable 20 milliGRAM(s) IV Push every 12 hours  influenza   Vaccine 0.5 milliLiter(s) IntraMuscular once  metoprolol succinate ER 50 milliGRAM(s) Oral daily  pantoprazole    Tablet 40 milliGRAM(s) Oral before breakfast    MEDICATIONS  (PRN):  acetaminophen   Tablet .. 650 milliGRAM(s) Oral every 6 hours PRN Temp greater or equal to 38C (100.4F), Moderate Pain (4 - 6)        Vital Signs Last 24 Hrs  T(C): 36.7 (24 Sep 2018 08:20), Max: 36.8 (23 Sep 2018 10:12)  T(F): 98.1 (24 Sep 2018 08:20), Max: 98.2 (23 Sep 2018 10:12)  HR: 75 (24 Sep 2018 07:21) (71 - 89)  BP: 117/66 (24 Sep 2018 06:00) (117/66 - 133/74)  BP(mean): 80 (24 Sep 2018 06:00) (76 - 89)  RR: 24 (24 Sep 2018 06:00) (14 - 31)  SpO2: 99% (24 Sep 2018 07:21) (90% - 99%)Daily Height in cm: 172.72 (23 Sep 2018 10:12)    Daily Weight in k.7 (24 Sep 2018 04:01)I&O's Summary    23 Sep 2018 07:01  -  24 Sep 2018 07:00  --------------------------------------------------------  IN: 600 mL / OUT: 1980 mL / NET: -1380 mL        PHYSICAL EXAM:    Constitutional: NAD, awake and alert, well-developed  HEENT: PERR, EOMI,  No oral cyananosis.  Neck:  supple,  No JVD  Respiratory: Breath sounds are clear bilaterally, No wheezing, rales or rhonchi  Cardiovascular: S1 and S2, regular rate and rhythm, no Murmurs, gallops or rubs. Post op sternotomy  Gastrointestinal: Bowel Sounds present, soft, nontender.   Extremities: No peripheral edema. No clubbing or cyanosis.  Vascular: 2+ peripheral pulses  Neurological: A/O x 3, no focal deficits  Musculoskeletal: no calf tenderness.  Skin: No rashes.      LABS: All Labs Reviewed:                        10.4   13.42 )-----------( 629      ( 24 Sep 2018 06:19 )             30.9                         10.5   10.49 )-----------( 527      ( 23 Sep 2018 10:40 )             30.8     24 Sep 2018 06:18    133    |  99     |  19     ----------------------------<  154    4.3     |  26     |  1.21   23 Sep 2018 10:40    135    |  103    |  18     ----------------------------<  122    4.5     |  25     |  1.01     Ca    8.7        24 Sep 2018 06:18  Ca    8.3        23 Sep 2018 10:40    TPro  7.1    /  Alb  2.5    /  TBili  0.5    /  DBili  x      /  AST  28     /  ALT  98     /  AlkPhos  127    24 Sep 2018 06:18  TPro  7.0    /  Alb  2.5    /  TBili  0.5    /  DBili  x      /  AST  50     /  ALT  113    /  AlkPhos  136    23 Sep 2018 10:40    PT/INR - ( 23 Sep 2018 10:40 )   PT: 13.5 sec;   INR: 1.23 ratio         PTT - ( 23 Sep 2018 10:40 )  PTT:24.2 sec  CARDIAC MARKERS ( 24 Sep 2018 00:03 )  .163 ng/mL / x     / x     / x     / x      CARDIAC MARKERS ( 23 Sep 2018 16:07 )  .187 ng/mL / x     / 27 U/L / x     / x      CARDIAC MARKERS ( 23 Sep 2018 10:40 )  .219 ng/mL / x     / x     / x     / x          Blood Culture:    @ 06:18  Pro Bnp 1094   @ 10:40  Pro Bnp 1361        RADIOLOGY/EKG:      ECHO/CARDIAC CATHTERIZATION/STRESS TEST:< from: US Transthoracic Echocardiogram w/Doppler Complete (18 @ 12:00) >  CONCLUSIONS:  1. Normal left ventricular size and function. LVEF estimated at 70%.  2. Grossly normal right ventricular size and function.  3. Mild left atrial enlargement.  4. Bioprosthetic aortic valve present.  The peak transaortic velocity is   approximately 1.6 m/s, which is normal in the setting of a prosthetic   valve.  5. Minimal pericardial effusion.                    CHANTAL ANTON M.D., ATTENDING CARDIOLOGIST  This document has been electronically signed. Sep 23 2018 12:51PM                < end of copied text >
PULMONARY/CRITICAL CARE      INTERVAL HPI/OVERNIGHT EVENTS:  Breathing much improved after thoracentesis, but went into rapid AF early am. Given Amiodarone and Lopressor with slowing of rate. No cp.    78y MaleHPI:  78m s/p recent extensive thoracic surgery at [facility] on  by Dr. 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 Post-op course complicated by A.fib and CHF requiring initiation of Amiodarone and Lasix. No Lasix prescribed on discharge, now p/w SOB.   CXR shows congestion and small effusions.  In ER, given Lasix 40 iv, Solumedrol and nebs.  Seen by cardio/pulm.  Elevated d-dimer - CT angio pending. (23 Sep 2018 13:04)        PAST MEDICAL & SURGICAL HISTORY:  UTI (urinary tract infection)  Sepsis due to Escherichia coli  Thoracic aortic aneurysm without rupture  HLD (hyperlipidemia)  BPH (Benign Prostatic Hyperplasia)  HTN (hypertension)  AF (atrial fibrillation)  Atrial fibrillation status post cardioversion  S/p tibial fracture        ICU Vital Signs Last 24 Hrs  T(C): 36.6 (25 Sep 2018 04:08), Max: 36.7 (24 Sep 2018 08:20)  T(F): 97.9 (25 Sep 2018 04:08), Max: 98.1 (24 Sep 2018 08:20)  HR: 98 (25 Sep 2018 06:00) (70 - 100)  BP: 100/65 (25 Sep 2018 06:00) (96/72 - 131/65)  BP(mean): 76 (25 Sep 2018 06:00) (70 - 88)  ABP: --  ABP(mean): --  RR: 23 (25 Sep 2018 06:00) (15 - 30)  SpO2: 95% (25 Sep 2018 06:00) (88% - 100%)    Qtts:     I&O's Summary    23 Sep 2018 07:01  -  24 Sep 2018 07:00  --------------------------------------------------------  IN: 600 mL / OUT: 1980 mL / NET: -1380 mL    24 Sep 2018 07:01  -  25 Sep 2018 06:54  --------------------------------------------------------  IN: 670 mL / OUT: 1320 mL / NET: -650 mL            REVIEW OF SYSTEMS:    CONSTITUTIONAL: No fever, weight loss, or fatigue  EYES: No eye pain, visual disturbances, or discharge  ENMT:  No difficulty hearing, tinnitus, vertigo; No sinus or throat pain  NECK: No pain or stiffness  BREASTS: No pain, masses, or nipple discharge  RESPIRATORY: No cough, wheezing, chills or hemoptysis; less shortness of breath  CARDIOVASCULAR: No chest pain, palpitations, dizziness, or leg swelling  GASTROINTESTINAL: No abdominal or epigastric pain. No nausea, vomiting, or hematemesis; No diarrhea or constipation. No melena or hematochezia.  GENITOURINARY: No dysuria, frequency, hematuria, or incontinence  NEUROLOGICAL: No headaches, memory loss, loss of strength, numbness, or tremors  SKIN: No itching, burning, rashes, or lesions   LYMPH NODES: No enlarged glands  ENDOCRINE: No heat or cold intolerance; No hair loss  MUSCULOSKELETAL: No joint pain or swelling; No muscle, back, or extremity pain, no calf tenderness  PSYCHIATRIC: No depression, anxiety, mood swings, or difficulty sleeping  HEME/LYMPH: No easy bruising, or bleeding gums  ALLERGY AND IMMUNOLOGIC: No hives or eczema      PHYSICAL EXAM:    GENERAL: NAD, well-groomed, well-developed, NAD  HEAD:  Atraumatic, Normocephalic  EYES: EOMI, PERRLA, conjunctiva and sclera clear  ENMT: No tonsillar erythema, exudates, or enlargement; Moist mucous membranes, Good dentition, No lesions  NECK: Supple, No JVD, Normal thyroid  NERVOUS SYSTEM:  Alert & Oriented X3, Good concentration; Motor Strength 5/5 B/L upper and lower extremities  CHEST/LUNG: Few crackles left base. Decreased bs, decreased fremitus, bs left base.  HEART: irregular rate and rhythm; No murmurs, rubs, or gallops  ABDOMEN: Soft, Nontender, Nondistended; Bowel sounds present  EXTREMITIES:  2+ Peripheral Pulses, No clubbing, cyanosis, or edema  LYMPH: No lymphadenopathy noted  SKIN: No rashes or lesions        LABS:                        10.1   16.70 )-----------( 548      ( 25 Sep 2018 05:30 )             30.4         133<L>  |  99  |  29<H>  ----------------------------<  144<H>  4.2   |  29  |  1.23    Ca    8.0<L>      25 Sep 2018 05:30  Mg     2.6         TPro  6.3  /  Alb  2.3<L>  /  TBili  0.4  /  DBili  x   /  AST  21  /  ALT  78<H>  /  AlkPhos  107      PT/INR - ( 23 Sep 2018 10:40 )   PT: 13.5 sec;   INR: 1.23 ratio         PTT - ( 23 Sep 2018 10:40 )  PTT:24.2 sec  Urinalysis Basic - ( 23 Sep 2018 12:17 )    Color: Yellow / Appearance: Clear / S.010 / pH: x  Gluc: x / Ketone: Negative  / Bili: Negative / Urobili: Negative mg/dL   Blood: x / Protein: Negative mg/dL / Nitrite: Negative   Leuk Esterase: Negative / RBC: x / WBC x   Sq Epi: x / Non Sq Epi: x / Bacteria: x        vanco through     RADIOLOGY & ADDITIONAL STUDIES:< from: US Guided Thoracentesis w/Imaging, Right (18 @ 14:54) >    EXAM:  US THORACENTESIS RT [facility]                                  PROCEDURE DATE:  2018          INTERPRETATION:    Ultrasound-guided diagnostic and therapeutic right pleural drainage:    Clinical History:    Postop aortic surgery the right pleural fluid. Image guided drainage   procedure.    Technique:    An informed consent obtained from patient . Risks and benefits explained   in detail. Patient made sit on the stretcher foward leaning on a pillow.   Time out procedure was performed. Limited ultrasonography demonstrated   right pleural fluid. The site marked on the skin.    The access site prepped and draped in sterile fashion. 1% lidocaine   utilized for local anesthesia. Right pleural cavity accessed, under   ultrasound guidance, using safety centesis catheter.   Ultrasound guidance with permanent recording and reporting. Approximately   560 cc of serosanguineous fluid drained. The catheter removed. Site   dressed in sterile fashion.  Follow-up Chest x-ray demonstrated no pneumothorax.   Patient tolerated the procedure well. No complications noted. Patient   transferred to the floor in stable condition for further observation and   management.    Impression:    Ultrasound guided right pleural drainage. Fluid sent for lab analysis.    < end of copied text >  < from: Xray Chest 1 View- PORTABLE-Urgent (18 @ 14:56) >  EXAM:  XR CHEST PORTABLE URGENT 1V                                  PROCEDURE DATE:  2018          INTERPRETATION:  Clinical information: Status post thoracentesis.   Difficulty breathing.    Technique: Frontal view of the chest.    Comparison: Prior chest x-ray examination from 2018 at 5:33 PM.    Findings: There is been interval decrease in size of a right-sided   pleural effusion. No right-sided pneumothorax is visualized. A small   layering left-sided pleural effusion remains unchanged.    The heart size is enlarged. The patient is status post median sternotomy.   The patient is status post aortic valve replacement.    There are mild multilevel degenerative changes of the thoracic spine.    IMPRESSION: Interval decrease in size of a right-sided layering pleural   effusion. Unchanged small layering left-sided pleural effusion.                    RYANNE ZHAO M.D., ATTENDING RADIOLOGIST  This document has been electronically signed. Sep 24 2018  3:21PM    < end of copied text >        CRITICAL CARE TIME SPENT:
PULMONARY/CRITICAL CARE      INTERVAL HPI/OVERNIGHT EVENTS:  Still in AT Fib. Less sob. No cp now. Some palpitations.    78y MaleHPI:  78m s/p recent extensive thoracic surgery at [place] on 9/12 by Dr. 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 Post-op course complicated by A.fib and CHF requiring initiation of Amiodarone and Lasix. No Lasix prescribed on discharge, now p/w SOB.   CXR shows congestion and small effusions.  In ER, given Lasix 40 iv, Solumedrol and nebs.  Seen by cardio/pulm.  Elevated d-dimer - CT angio pending. (23 Sep 2018 13:04)        PAST MEDICAL & SURGICAL HISTORY:  UTI (urinary tract infection)  Sepsis due to Escherichia coli  Thoracic aortic aneurysm without rupture  HLD (hyperlipidemia)  BPH (Benign Prostatic Hyperplasia)  HTN (hypertension)  AF (atrial fibrillation)  Atrial fibrillation status post cardioversion  S/p tibial fracture        ICU Vital Signs Last 24 Hrs  T(C): 36.4 (26 Sep 2018 04:15), Max: 36.7 (25 Sep 2018 12:46)  T(F): 97.5 (26 Sep 2018 04:15), Max: 98.1 (25 Sep 2018 12:46)  HR: 93 (26 Sep 2018 08:00) (85 - 105)  BP: 99/66 (26 Sep 2018 08:00) (76/61 - 140/73)  BP(mean): 77 (26 Sep 2018 08:00) (77 - 94)  ABP: --  ABP(mean): --  RR: 21 (26 Sep 2018 08:00) (11 - 26)  SpO2: 94% (26 Sep 2018 08:00) (94% - 97%)    Qtts:     I&O's Summary    25 Sep 2018 07:01  -  26 Sep 2018 07:00  --------------------------------------------------------  IN: 930 mL / OUT: 1800 mL / NET: -870 mL    26 Sep 2018 07:01  -  26 Sep 2018 09:18  --------------------------------------------------------  IN: 0 mL / OUT: 200 mL / NET: -200 mL          REVIEW OF SYSTEMS:    CONSTITUTIONAL: No fever, weight loss, or fatigue  EYES: No eye pain, visual disturbances, or discharge  ENMT:  No difficulty hearing, tinnitus, vertigo; No sinus or throat pain  RESPIRATORY: No cough, wheezing, chills or hemoptysis; less shortness of breath  CARDIOVASCULAR: No chest pain, palpitations, dizziness, or leg swelling  GASTROINTESTINAL: No abdominal or epigastric pain. No nausea, vomiting, or hematemesis; No diarrhea or constipation. No melena or hematochezia.  NEUROLOGICAL: No headaches, memory loss, loss of strength, numbness, or tremors  SKIN: No itching, burning, rashes, or lesions       PHYSICAL EXAM:    GENERAL: NAD, well-groomed, well-developed, NAD  HEAD:  Atraumatic, Normocephalic  EYES: EOMI, PERRLA, conjunctiva and sclera clear  ENMT: No tonsillar erythema, exudates, or enlargement; Moist mucous membranes, Good dentition, No lesions  NECK: Supple, No JVD, Normal thyroid  NERVOUS SYSTEM:  Alert & Oriented X3, Good concentration; Motor Strength 5/5 B/L upper and lower extremities  CHEST/LUNG: Few crackles left base. Decreased bs, decreased fremitus, bs left base.  HEART: irregular rate and rhythm; No murmurs, rubs, or gallops  ABDOMEN: Soft, Nontender, Nondistended; Bowel sounds present  EXTREMITIES:  2+ Peripheral Pulses, No clubbing, cyanosis, or edema  LYMPH: No lymphadenopathy noted  SKIN: No rashes or lesions          LABS:                        10.1   16.70 )-----------( 548      ( 25 Sep 2018 05:30 )             30.4     09-26    135  |  104  |  25<H>  ----------------------------<  106<H>  4.2   |  29  |  1.03    Ca    7.8<L>      26 Sep 2018 05:48  Phos  3.6     09-25  Mg     2.4     09-26    TPro  6.1  /  Alb  2.3<L>  /  TBili  0.4  /  DBili  x   /  AST  18  /  ALT  57  /  AlkPhos  103  09-26          vanco through     RADIOLOGY & ADDITIONAL STUDIES:  CXR improved.    CRITICAL CARE TIME SPENT:
PULMONARY/CRITICAL CARE      INTERVAL HPI/OVERNIGHT EVENTS:  Still some sob. Coughs when takes deep breath. No chest pain. No fever. Borderline O2 sat.  78y MaleHPI:  78m s/p recent extensive thoracic surgery at [place] on  by Dr. 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 Post-op course complicated by A.fib and CHF requiring initiation of Amiodarone and Lasix. No Lasix prescribed on discharge, now p/w SOB.   CXR shows congestion and small effusions.  In ER, given Lasix 40 iv, Solumedrol and nebs.  Seen by cardio/pulm.  Elevated d-dimer - CT angio noted        PAST MEDICAL & SURGICAL HISTORY:  UTI (urinary tract infection)  Sepsis due to Escherichia coli  Thoracic aortic aneurysm without rupture  HLD (hyperlipidemia)  BPH (Benign Prostatic Hyperplasia)  HTN (hypertension)  AF (atrial fibrillation)  Atrial fibrillation status post cardioversion  S/p tibial fracture        ICU Vital Signs Last 24 Hrs  T(C): 36.6 (24 Sep 2018 04:01), Max: 36.8 (23 Sep 2018 10:12)  T(F): 97.9 (24 Sep 2018 04:01), Max: 98.2 (23 Sep 2018 10:12)  HR: 71 (24 Sep 2018 06:00) (71 - 89)  BP: 117/66 (24 Sep 2018 06:00) (117/66 - 133/74)  BP(mean): 80 (24 Sep 2018 06:00) (76 - 89)  ABP: --  ABP(mean): --  RR: 24 (24 Sep 2018 06:00) (14 - 31)  SpO2: 90% (24 Sep 2018 06:00) (90% - 97%)    Qtts:     I&O's Summary    23 Sep 2018 07:01  -  24 Sep 2018 06:49  --------------------------------------------------------  IN: 600 mL / OUT: 1980 mL / NET: -1380 mL            REVIEW OF SYSTEMS:    CONSTITUTIONAL: No fever, weight loss, or fatigue  EYES: No eye pain, visual disturbances, or discharge  ENMT:  No difficulty hearing, tinnitus, vertigo; No sinus or throat pain  NECK: No pain or stiffness  BREASTS: No pain, masses, or nipple discharge  RESPIRATORY: some cough, no wheezing, chills or hemoptysis; some shortness of breath  CARDIOVASCULAR: No chest pain, palpitations, dizziness, or leg swelling  GASTROINTESTINAL: No abdominal or epigastric pain. No nausea, vomiting, or hematemesis; No diarrhea or constipation. No melena or hematochezia.  GENITOURINARY: No dysuria, frequency, hematuria, or incontinence  NEUROLOGICAL: No headaches, memory loss, loss of strength, numbness, or tremors  SKIN: No itching, burning, rashes, or lesions   LYMPH NODES: No enlarged glands  ENDOCRINE: No heat or cold intolerance; No hair loss  MUSCULOSKELETAL: No joint pain or swelling; No muscle, back, or extremity pain, no calf tenderness  PSYCHIATRIC: No depression, anxiety, mood swings, or difficulty sleeping  HEME/LYMPH: No easy bruising, or bleeding gums  ALLERGY AND IMMUNOLOGIC: No hives or eczema      PHYSICAL EXAM:    GENERAL: NAD, well-groomed, well-developed, NAD  HEAD:  Atraumatic, Normocephalic  EYES: EOMI, PERRLA, conjunctiva and sclera clear  ENMT: No tonsillar erythema, exudates, or enlargement; Moist mucous membranes, Good dentition, No lesions  NECK: Supple, No JVD, Normal thyroid  NERVOUS SYSTEM:  Alert & Oriented X3, Good concentration; Motor Strength 5/5 B/L upper and lower extremities  CHEST/LUNG: Decreased bs 1/3 right, left base with decreased vocal fremitus right 1/3 up.  No rales, rhonchi, wheezing, or rubs  HEART: Regular rate and rhythm; 1/6 sytolic murmurs, no rubs, or gallops  Healing sterotomy incision  ABDOMEN: Soft, Nontender, Nondistended; Bowel sounds present  EXTREMITIES:  2+ Peripheral Pulses, No clubbing, cyanosis, or edema  No calf tenderness  LYMPH: No lymphadenopathy noted  SKIN: No rashes or lesions        LABS:                        10.4   13.42 )-----------( 629      ( 24 Sep 2018 06:19 )             30.9     09-23    135  |  103  |  18  ----------------------------<  122<H>  4.5   |  25  |  1.01    Ca    8.3<L>      23 Sep 2018 10:40    TPro  7.0  /  Alb  2.5<L>  /  TBili  0.5  /  DBili  x   /  AST  50<H>  /  ALT  113<H>  /  AlkPhos  136<H>      PT/INR - ( 23 Sep 2018 10:40 )   PT: 13.5 sec;   INR: 1.23 ratio         PTT - ( 23 Sep 2018 10:40 )  PTT:24.2 sec  Urinalysis Basic - ( 23 Sep 2018 12:17 )    Color: Yellow / Appearance: Clear / S.010 / pH: x  Gluc: x / Ketone: Negative  / Bili: Negative / Urobili: Negative mg/dL   Blood: x / Protein: Negative mg/dL / Nitrite: Negative   Leuk Esterase: Negative / RBC: x / WBC x   Sq Epi: x / Non Sq Epi: x / Bacteria: x        vanco through     RADIOLOGY & ADDITIONAL STUDIES:  < from: CT Angio Chest w/ IV Cont (18 @ 13:19) >  EXAM:  CT ANGIO CHEST (W)AW IC                            PROCEDURE DATE:  2018          INTERPRETATION:  Clinical Information: Dyspnea    Comparison: None available    Procedure: Contrast enhanced CT angiogram of the chest with axial,   sagittal, coronal, and axial MIP reconstructions. 100 cc of Omnipaque 350   utilized.    Findings:     Vasculature: No pulmonary emboli. No aneurysm or dissection of the aorta   or imaged great vessels.    Airways, pleura, lungs: Small left pleural effusion. Moderate right   pleural effusion. There are multiple large bulla in the left lower lobe,   and there are additional air cysts scattered throughout the lungs,   unclear etiology.    Mediastinum and robin: Status post aortic valve replacement and ascending   aorta graft. There is a poorly defined mediastinal fluid collection   surrounding the ascending aorta graft, likely postoperative. There is no   contrast extravasation within the fluid collection. Trace pericardial   effusion. Esophagus unremarkable.    Chest wall and lower neck: Unremarkable  Imaged upper abdomen: Unremarkable  Musculoskeletal: Sternotomy. Degenerative disc disease.    Impression:   -No pulmonary emboli.  -Moderate right pleural effusion and small left pleural effusion with   associated compressive atelectasis.  -Several large bulla within the left lower lobe and multifocal air cysts,   unknown etiology.    -Ill-defined fluid collection within the mediastinum, mostly surrounding   the ascending aorta graft; likely postsurgical. No evidence of acute   hemorrhage. Correlate clinically for superimposed infection.  -Trace pericardial effusion.                SARI HERNANDEZ M.D., ATTENDING RADIOLOGIST  This document has been electronically signed. Sep 23 2018  1:34PM              < end of copied text >  < from: US Transthoracic Echocardiogram w/Doppler Complete (18 @ 12:00) >  EXAM:  US TTE W DOPPLER COMPLETE                                  PROCEDURE DATE:  2018          INTERPRETATION:  Ordering Physician: CHANTAL ANTON [id number]    Indication: Dyspnea.    Technician: Liberty Carolina    Study Quality: Fair     Height: 5 feet 8 inches  Weight: 169 pounds  Blood Pressure: 122/68    MEASUREMENTS  IVS: 1.0 cm  PWT: 1.0 cm  LA: 4.2 cm  Aortic Root: 3.0 cm  LVIDd: 4.9 cm  LVIDs: 3.8 cm    LVEF: Approximately 70%    FINDINGS  Left Ventricle: Normal left ventricular size and function. LVEF estimated   at 70%.  Right Ventricle: Grossly normal right ventricular size and function.  Left Atrium: Mild left atrial enlargement.  Right Atrium: Normal right atrium.  Mitral Valve: Normal mitral valve.  Aortic Valve: Bioprosthetic aortic valve present; leaflets not well   visualized.  The peak transaortic velocity is approximately 1.6 m/s,   which is normal in the setting of a prosthetic valve.  Tricuspid Valve: Normal tricuspid valve.  Pulmonic Valve: Normal pulmonic valve. Minimal pulmonic insufficiency.  Pericardium/Pleura: Minimal pericardial effusion.      CONCLUSIONS:  1. Normal left ventricular size and function. LVEF estimated at 70%.  2. Grossly normal right ventricular size and function.  3. Mild left atrial enlargement.  4. Bioprosthetic aortic valve present.  The peak transaortic velocity is   approximately 1.6 m/s, which is normal in the setting of a prosthetic   valve.  5. Minimal pericardial effusion.        < end of copied text >      CRITICAL CARE TIME SPENT:
Patient is a 78y old  Male who presents with a chief complaint of SOB  AAA repair (23 Sep 2018 14:44)      BRIEF HOSPITAL COURSE:     78M S/P AAA repair, complicated by rapid afib at [place]. Admitted to [facility] with CHF exacerbation.    Events last 24 hours:   -comfortable on room air  -rate controlled, sinus rhythm now     PAST MEDICAL & SURGICAL HISTORY:  UTI (urinary tract infection)  Sepsis due to Escherichia coli  Thoracic aortic aneurysm without rupture  HLD (hyperlipidemia)  BPH (Benign Prostatic Hyperplasia)  HTN (hypertension)  AF (atrial fibrillation)  Atrial fibrillation status post cardioversion  S/p tibial fracture      Review of Systems:  CONSTITUTIONAL: No fever, chills, or fatigue  EYES: No eye pain, visual disturbances, or discharge  ENMT:  No difficulty hearing, tinnitus, vertigo; No sinus or throat pain  NECK: No pain or stiffness  RESPIRATORY: No cough, wheezing, chills or hemoptysis; No shortness of breath  CARDIOVASCULAR: No chest pain, palpitations, dizziness, or leg swelling  GASTROINTESTINAL: No abdominal or epigastric pain. No nausea, vomiting, or hematemesis; No diarrhea or constipation. No melena or hematochezia.  GENITOURINARY: No dysuria, frequency, hematuria, or incontinence  NEUROLOGICAL: No headaches, memory loss, loss of strength, numbness, or tremors  SKIN: No itching, burning, rashes, or lesions   MUSCULOSKELETAL: No joint pain or swelling; No muscle, back, or extremity pain  PSYCHIATRIC: No depression, anxiety, mood swings, or difficulty sleeping      Medications:    amiodarone    Tablet 400 milliGRAM(s) Oral two times a day  amLODIPine   Tablet 10 milliGRAM(s) Oral daily  furosemide   Injectable 20 milliGRAM(s) IV Push every 12 hours  metoprolol succinate ER 50 milliGRAM(s) Oral daily    ALBUTerol/ipratropium for Nebulization 3 milliLiter(s) Nebulizer every 6 hours  buDESOnide 160 MICROgram(s)/formoterol 4.5 MICROgram(s) Inhaler 2 Puff(s) Inhalation two times a day    acetaminophen   Tablet .. 650 milliGRAM(s) Oral every 6 hours PRN  aspirin 325 milliGRAM(s) Oral daily      enoxaparin Injectable 40 milliGRAM(s) SubCutaneous daily    pantoprazole    Tablet 40 milliGRAM(s) Oral before breakfast          influenza   Vaccine 0.5 milliLiter(s) IntraMuscular once              ICU Vital Signs Last 24 Hrs  T(C): 36.8 (23 Sep 2018 20:01), Max: 36.8 (23 Sep 2018 10:12)  T(F): 98.2 (23 Sep 2018 20:01), Max: 98.2 (23 Sep 2018 10:12)  HR: 82 (23 Sep 2018 20:00) (79 - 84)  BP: 120/66 (23 Sep 2018 20:00) (119/74 - 133/74)  BP(mean): 82 (23 Sep 2018 20:00) (76 - 89)  ABP: --  ABP(mean): --  RR: 22 (23 Sep 2018 20:00) (14 - 30)  SpO2: 94% (23 Sep 2018 20:00) (94% - 97%)          I&O's Detail    23 Sep 2018 07:01  -  23 Sep 2018 20:57  --------------------------------------------------------  IN:    Oral Fluid: 600 mL  Total IN: 600 mL    OUT:    Voided: 1430 mL  Total OUT: 1430 mL    Total NET: -830 mL            LABS:                        10.5   10.49 )-----------( 527      ( 23 Sep 2018 10:40 )             30.8     -    135  |  103  |  18  ----------------------------<  122<H>  4.5   |  25  |  1.01    Ca    8.3<L>      23 Sep 2018 10:40    TPro  7.0  /  Alb  2.5<L>  /  TBili  0.5  /  DBili  x   /  AST  50<H>  /  ALT  113<H>  /  AlkPhos  136<H>        CARDIAC MARKERS ( 23 Sep 2018 16:07 )  .187 ng/mL / x     / 27 U/L / x     / x      CARDIAC MARKERS ( 23 Sep 2018 10:40 )  .219 ng/mL / x     / x     / x     / x          CAPILLARY BLOOD GLUCOSE        PT/INR - ( 23 Sep 2018 10:40 )   PT: 13.5 sec;   INR: 1.23 ratio         PTT - ( 23 Sep 2018 10:40 )  PTT:24.2 sec  Urinalysis Basic - ( 23 Sep 2018 12:17 )    Color: Yellow / Appearance: Clear / S.010 / pH: x  Gluc: x / Ketone: Negative  / Bili: Negative / Urobili: Negative mg/dL   Blood: x / Protein: Negative mg/dL / Nitrite: Negative   Leuk Esterase: Negative / RBC: x / WBC x   Sq Epi: x / Non Sq Epi: x / Bacteria: x      CULTURES:      Physical Examination:    General: No acute distress.  Alert, oriented, interactive, nonfocal    HEENT: Pupils equal, reactive to light.  Symmetric.    PULM: diminished at bases bilaterally, no significant sputum production    CVS: Regular rate and rhythm, no murmurs, rubs, or gallops    ABD: Soft, nondistended, nontender, normoactive bowel sounds, no masses    EXT: No edema, nontender    SKIN: Warm and well perfused, no rashes noted.    RADIOLOGY:   < from: CT Angio Chest w/ IV Cont (18 @ 13:19) >  No pulmonary emboli.  -Moderate right pleural effusion and small left pleural effusion with   associated compressive atelectasis.  -Several large bulla within the left lower lobe and multifocal air cysts,   unknown etiology.    -Ill-defined fluid collection within the mediastinum, mostly surrounding   the ascending aorta graft; likely postsurgical. No evidence of acute   hemorrhage. Correlate clinically for superimposed infection.  -Trace pericardial effusion.    < end of copied text >
Patient is a 78y old  Male who presents with a chief complaint of SOB (24 Sep 2018 08:28)      INTERVAL History of Present Illness/OVERNIGHT EVENTS: 60% improved since admission.  able to walk around.  would like to know pleural effusion volume prior to thoracentesis.    MEDICATIONS  (STANDING):  ALBUTerol/ipratropium for Nebulization 3 milliLiter(s) Nebulizer every 6 hours  amiodarone    Tablet 400 milliGRAM(s) Oral two times a day  amLODIPine   Tablet 10 milliGRAM(s) Oral daily  aspirin 325 milliGRAM(s) Oral daily  buDESOnide 160 MICROgram(s)/formoterol 4.5 MICROgram(s) Inhaler 2 Puff(s) Inhalation two times a day  enoxaparin Injectable 40 milliGRAM(s) SubCutaneous daily  furosemide   Injectable 20 milliGRAM(s) IV Push every 12 hours  influenza   Vaccine 0.5 milliLiter(s) IntraMuscular once  metoprolol succinate ER 50 milliGRAM(s) Oral daily  pantoprazole    Tablet 40 milliGRAM(s) Oral before breakfast    MEDICATIONS  (PRN):  acetaminophen   Tablet .. 650 milliGRAM(s) Oral every 6 hours PRN Temp greater or equal to 38C (100.4F), Moderate Pain (4 - 6)      Allergies    No Known Allergies    Intolerances        REVIEW OF SYSTEMS:  Negative unless otherwise specified above.    Vital Signs Last 24 Hrs  T(C): 36.6 (24 Sep 2018 12:50), Max: 36.8 (23 Sep 2018 20:01)  T(F): 97.8 (24 Sep 2018 12:50), Max: 98.2 (23 Sep 2018 20:01)  HR: 76 (24 Sep 2018 13:06) (71 - 89)  BP: 119/64 (24 Sep 2018 12:00) (117/66 - 128/68)  BP(mean): 81 (24 Sep 2018 12:00) (76 - 89)  RR: 22 (24 Sep 2018 12:00) (19 - 31)  SpO2: 99% (24 Sep 2018 13:06) (90% - 99%)        PHYSICAL EXAM:  GENERAL: No apparent distress  HEAD:  Atraumatic, Normocephalic  EYES: conjunctiva and sclera clear  ENMT: Moist mucous membranes  NECK: no JVD  CHEST/LUNG: improved aeration - left base is clear and mild diminished BS right base  HEART: Regular rate and rhythm  ABDOMEN: Soft, Nontender, Nondistended; Bowel sounds present  EXTREMITIES:  No clubbing, cyanosis or edema  SKIN: No rashes or lesions  NERVOUS SYSTEM:  Alert & Oriented X3; Bilateral Lower extremity mobile, sensation to light touch intact      LABS:                        10.4   13.42 )-----------( 629      ( 24 Sep 2018 06:19 )             30.9     24 Sep 2018 06:18    133    |  99     |  19     ----------------------------<  154    4.3     |  26     |  1.21     Ca    8.7        24 Sep 2018 06:18    TPro  7.1    /  Alb  2.5    /  TBili  0.5    /  DBili  x      /  AST  28     /  ALT  98     /  AlkPhos  127    24 Sep 2018 06:18    PT/INR - ( 23 Sep 2018 10:40 )   PT: 13.5 sec;   INR: 1.23 ratio         PTT - ( 23 Sep 2018 10:40 )  PTT:24.2 sec  Urinalysis Basic - ( 23 Sep 2018 12:17 )    Color: Yellow / Appearance: Clear / S.010 / pH: x  Gluc: x / Ketone: Negative  / Bili: Negative / Urobili: Negative mg/dL   Blood: x / Protein: Negative mg/dL / Nitrite: Negative   Leuk Esterase: Negative / RBC: x / WBC x   Sq Epi: x / Non Sq Epi: x / Bacteria: x      CAPILLARY BLOOD GLUCOSE          RADIOLOGY & ADDITIONAL TESTS:    Images reviewed personally    Consultant Notes Reviewed and Care Discussed with relevant Consultants/Other Providers.
Patient is a 78y old  Male who presents with a chief complaint of SOB (24 Sep 2018 13:11)      BRIEF HOSPITAL COURSE: 78 year old male PMHx. HTN, HLD, PAF ( prior DCCV) BPH, Recent Aortic Arch Rpr with Bio-AVR and L Atrial Appendage closure ([place] on 2018). Post-op course complicated by AF w/rvr  and CHF requiring initiation of Amiodarone and Lasix. Presented to  ED  with SOB.  Rx with CHF.  CTA was Neg for PE but revealed Pulmonary venous Congestion and Small B/L effusions.  S/P Thorocentesis of the larger R Effusion 2018.    Events last 24 hours: SOB Improved,  Tonight with NSR to AF conversion.  Asymptomatic.    PAST MEDICAL & SURGICAL HISTORY:  UTI (urinary tract infection)  Sepsis due to Escherichia coli  Thoracic aortic aneurysm without rupture  HLD (hyperlipidemia)  BPH (Benign Prostatic Hyperplasia)  HTN (hypertension)  AF (atrial fibrillation)  Atrial fibrillation status post cardioversion  S/p tibial fracture      Review of Systems:  CONSTITUTIONAL: No fever, chills, or fatigue  EYES: No eye pain, visual disturbances, or discharge  ENMT:  No difficulty hearing, tinnitus, vertigo; No sinus or throat pain  NECK: No pain or stiffness  RESPIRATORY: No cough, wheezing, chills or hemoptysis; No shortness of breath  CARDIOVASCULAR: No chest pain, palpitations, dizziness, or leg swelling  GASTROINTESTINAL: No abdominal or epigastric pain. No nausea, vomiting, or hematemesis; No diarrhea or constipation. No melena or hematochezia.  GENITOURINARY: No dysuria, frequency, hematuria, or incontinence  NEUROLOGICAL: No headaches, memory loss, loss of strength, numbness, or tremors  SKIN: No itching, burning, rashes, or lesions   MUSCULOSKELETAL: No joint pain or swelling; No muscle, back, or extremity pain  PSYCHIATRIC: No depression, anxiety, mood swings, or difficulty sleeping      Medications:  amiodarone    Tablet 400 milliGRAM(s) Oral two times a day  amLODIPine   Tablet 10 milliGRAM(s) Oral daily  furosemide   Injectable 20 milliGRAM(s) IV Push every 12 hours  metoprolol succinate ER 50 milliGRAM(s) Oral daily  ALBUTerol/ipratropium for Nebulization 3 milliLiter(s) Nebulizer every 6 hours  buDESOnide 160 MICROgram(s)/formoterol 4.5 MICROgram(s) Inhaler 2 Puff(s) Inhalation two times a day  acetaminophen   Tablet .. 650 milliGRAM(s) Oral every 6 hours PRN  aspirin 325 milliGRAM(s) Oral daily  enoxaparin Injectable 40 milliGRAM(s) SubCutaneous daily  magnesium hydroxide Suspension 30 milliLiter(s) Oral daily PRN  pantoprazole    Tablet 40 milliGRAM(s) Oral before breakfast  influenza   Vaccine 0.5 milliLiter(s) IntraMuscular once              ICU Vital Signs Last 24 Hrs  T(C): 36.6 (25 Sep 2018 04:08), Max: 36.7 (24 Sep 2018 08:20)  T(F): 97.9 (25 Sep 2018 04:08), Max: 98.1 (24 Sep 2018 08:20)  HR: 91 (25 Sep 2018 04:00) (70 - 100)  BP: 96/72 (25 Sep 2018 04:00) (96/72 - 131/65)  BP(mean): 79 (25 Sep 2018 04:00) (70 - 88)  RR: 22 (25 Sep 2018 04:00) (15 - 30)  SpO2: 88% (25 Sep 2018 04:00) (88% - 100%)          I&O's Detail    23 Sep 2018 07:01  -  24 Sep 2018 07:00  --------------------------------------------------------  IN:    Oral Fluid: 600 mL  Total IN: 600 mL    OUT:    Voided: 1980 mL  Total OUT: 1980 mL    Total NET: -1380 mL      24 Sep 2018 07:01  -  25 Sep 2018 05:09  --------------------------------------------------------  IN:    Oral Fluid: 120 mL    Other: 550 mL  Total IN: 670 mL    OUT:    Voided: 680 mL  Total OUT: 680 mL    Total NET: -10 mL            LABS:                        10.4   13.42 )-----------( 629      ( 24 Sep 2018 06:19 )             30.9     09-    133<L>  |  99  |  19  ----------------------------<  154<H>  4.3   |  26  |  1.21    Ca    8.7      24 Sep 2018 06:18    TPro  7.1  /  Alb  2.5<L>  /  TBili  0.5  /  DBili  x   /  AST  28  /  ALT  98<H>  /  AlkPhos  127<H>        CARDIAC MARKERS ( 24 Sep 2018 00:03 )  .163 ng/mL / x     / x     / x     / x      CARDIAC MARKERS ( 23 Sep 2018 16:07 )  .187 ng/mL / x     / 27 U/L / x     / x      CARDIAC MARKERS ( 23 Sep 2018 10:40 )  .219 ng/mL / x     / x     / x     / x          CAPILLARY BLOOD GLUCOSE        PT/INR - ( 23 Sep 2018 10:40 )   PT: 13.5 sec;   INR: 1.23 ratio         PTT - ( 23 Sep 2018 10:40 )  PTT:24.2 sec  Urinalysis Basic - ( 23 Sep 2018 12:17 )    Color: Yellow / Appearance: Clear / S.010 / pH: x  Gluc: x / Ketone: Negative  / Bili: Negative / Urobili: Negative mg/dL   Blood: x / Protein: Negative mg/dL / Nitrite: Negative   Leuk Esterase: Negative / RBC: x / WBC x   Sq Epi: x / Non Sq Epi: x / Bacteria: x      CULTURES:          Physical Examination:    General:  Awake.  Alert, oriented, interactive, nonfocal    HEENT: Pupils equal, reactive to light.  Symmetric. No JVD.    PULM: Posterior Rales Bibasilarly R > L, No Wheeze, No Rhonchi, no significant sputum production    CVS: Regular rate Irregular rhythm, no murmurs, rubs, or gallops - Sternal site with steri strips Healing nicely    ABD: Soft, nondistended, nontender, normoactive bowel sounds, no masses    EXT: No edema, nontender    SKIN: Warm and well perfused, no rashes noted.          RADIOLOGY:   < from: Xray Chest 1 View- PORTABLE-Urgent (18 @ 14:56) >  EXAM:  XR CHEST PORTABLE URGENT 1V                                  PROCEDURE DATE:  2018          INTERPRETATION:  Clinical information: Status post thoracentesis.   Difficulty breathing.    Technique: Frontal view of the chest.    Comparison: Prior chest x-ray examination from 2018 at 5:33 PM.    Findings: There is been interval decrease in size of a right-sided   pleural effusion. No right-sided pneumothorax is visualized. A small   layering left-sided pleural effusion remains unchanged.    The heart size is enlarged. The patient is status post median sternotomy.   The patient is status post aortic valve replacement.    There are mild multilevel degenerative changes of the thoracic spine.    IMPRESSION: Interval decrease in size of a right-sided layering pleural   effusion. Unchanged small layering left-sided pleural effusion.    < end of copied text >    CRITICAL CARE TIME SPENT: 32 minutes   (Assessing presenting problems of acute illness, which pose high probability of life threatening deterioration or end organ damage/dysfunction, as well as medical decision making including initiating plan of care, reviewing data, reviewing radiologic exams, discussing with multidisciplinary team,  discussing goals of care with patient/family, and writing this note.  Non-inclusive of procedures performed)
Patient is a 78y old  Male who presents with a chief complaint of SOB (25 Sep 2018 06:54)      INTERVAL History of Present Illness/OVERNIGHT EVENTS: overall improved though feels fatigued.  went into A.fib at 1am    MEDICATIONS  (STANDING):  amiodarone    Tablet 400 milliGRAM(s) Oral two times a day  aspirin 325 milliGRAM(s) Oral daily  enoxaparin Injectable 40 milliGRAM(s) SubCutaneous daily  influenza   Vaccine 0.5 milliLiter(s) IntraMuscular once  metoprolol succinate ER 50 milliGRAM(s) Oral daily  pantoprazole    Tablet 40 milliGRAM(s) Oral before breakfast    MEDICATIONS  (PRN):  acetaminophen   Tablet .. 650 milliGRAM(s) Oral every 6 hours PRN Temp greater or equal to 38C (100.4F), Moderate Pain (4 - 6)  ALBUTerol/ipratropium for Nebulization 3 milliLiter(s) Nebulizer every 6 hours PRN Wheezing  magnesium hydroxide Suspension 30 milliLiter(s) Oral daily PRN Constipation      Allergies    No Known Allergies    Intolerances        REVIEW OF SYSTEMS:  Negative unless otherwise specified above.    Vital Signs Last 24 Hrs  T(C): 36.6 (25 Sep 2018 04:08), Max: 36.7 (24 Sep 2018 15:20)  T(F): 97.9 (25 Sep 2018 04:08), Max: 98.1 (24 Sep 2018 15:20)  HR: 98 (25 Sep 2018 10:06) (70 - 100)  BP: 116/73 (25 Sep 2018 10:06) (76/61 - 131/65)  BP(mean): 76 (25 Sep 2018 06:00) (70 - 88)  RR: 23 (25 Sep 2018 06:00) (15 - 30)  SpO2: 95% (25 Sep 2018 06:00) (88% - 100%)        PHYSICAL EXAM:  GENERAL: No apparent distress  HEAD:  Atraumatic, Normocephalic  EYES: conjunctiva and sclera clear  ENMT: Moist mucous membranes  NECK: no JVD  CHEST/LUNG: improved aeration right lung base; diminished BS and bibasilar rales  HEART: Irregular rate and rhythm  ABDOMEN: Soft, Nontender, Nondistended; Bowel sounds present  EXTREMITIES:  No clubbing, cyanosis or edema  SKIN: No rashes or lesions  NERVOUS SYSTEM:  Alert & Oriented X3; Bilateral Lower extremity mobile, sensation to light touch intact      LABS:                        10.1   16.70 )-----------( 548      ( 25 Sep 2018 05:30 )             30.4     25 Sep 2018 05:30    133    |  99     |  29     ----------------------------<  144    4.2     |  29     |  1.23     Ca    8.0        25 Sep 2018 05:30  Mg     2.6       25 Sep 2018 05:30    TPro  6.3    /  Alb  2.3    /  TBili  0.4    /  DBili  x      /  AST  21     /  ALT  78     /  AlkPhos  107    25 Sep 2018 05:30        CAPILLARY BLOOD GLUCOSE          RADIOLOGY & ADDITIONAL TESTS:    Images reviewed personally    Consultant Notes Reviewed and Care Discussed with relevant Consultants/Other Providers.
Patient is a 78y old  Male who presents with a chief complaint of SOB (25 Sep 2018 15:16)      BRIEF HOSPITAL COURSE: 79 y/o male with pmhx of HTN, HLD, paroxysmal afib s/p 2 DCCV, thoracic aortic aneurysm s/p recent aortic arch repair with biologic aortic valve repair and left atrial appendage closure on 9/12 at [place] with Dr. Walton who presented to [place] ED on 9/23 c/o shortness of breath. Patients post op course was complicated by CHF and afib with RVR. In ED here he was found to have diffuse pulmonary congestion and small bilateral effusion R > L. R sided effusion wdrained on 9/24. Patient was started post operatively on amiodarone and lasix. During this hospital stay he converted from normal sinus rhythm back to afib and was rebolused with amiodarone.    Events last 24 hours: patient remains in a-fib with rate control ranging from 75-93. Denies shortness of breath. Admits to mild chest pressure at around 6 pm 9/25. CP is non-radiating and went away after less than a minute.    PAST MEDICAL & SURGICAL HISTORY:  UTI (urinary tract infection)  Sepsis due to Escherichia coli  Thoracic aortic aneurysm without rupture  HLD (hyperlipidemia)  BPH (Benign Prostatic Hyperplasia)  HTN (hypertension)  AF (atrial fibrillation)  Atrial fibrillation status post cardioversion  S/p tibial fracture      Review of Systems:  CONSTITUTIONAL: No fever, chills, or fatigue  EYES: No eye pain, visual disturbances, or discharge  ENMT:  No difficulty hearing, tinnitus, vertigo; No sinus or throat pain  NECK: No pain or stiffness  RESPIRATORY: No cough, wheezing, chills or hemoptysis; No shortness of breath  CARDIOVASCULAR: No palpitations, dizziness, or leg swelling  GASTROINTESTINAL: No abdominal or epigastric pain. No nausea, vomiting, or hematemesis; No diarrhea or constipation. No melena or hematochezia.  GENITOURINARY: No dysuria, frequency, hematuria, or incontinence  NEUROLOGICAL: No headaches, memory loss, loss of strength, numbness, or tremors  SKIN: No itching, burning, rashes, or lesions   MUSCULOSKELETAL: No joint pain or swelling; No muscle, back, or extremity pain  PSYCHIATRIC: No depression, anxiety, mood swings, or difficulty sleeping      Medications:    amiodarone    Tablet 400 milliGRAM(s) Oral two times a day  metoprolol succinate ER 50 milliGRAM(s) Oral daily    ALBUTerol/ipratropium for Nebulization 3 milliLiter(s) Nebulizer every 6 hours PRN    acetaminophen   Tablet .. 650 milliGRAM(s) Oral every 6 hours PRN  aspirin 325 milliGRAM(s) Oral daily      enoxaparin Injectable 70 milliGRAM(s) SubCutaneous every 12 hours    magnesium hydroxide Suspension 30 milliLiter(s) Oral daily PRN  pantoprazole    Tablet 40 milliGRAM(s) Oral before breakfast          influenza   Vaccine 0.5 milliLiter(s) IntraMuscular once              ICU Vital Signs Last 24 Hrs  T(C): 36.6 (26 Sep 2018 00:12), Max: 36.7 (25 Sep 2018 12:46)  T(F): 97.8 (26 Sep 2018 00:12), Max: 98.1 (25 Sep 2018 12:46)  HR: 95 (26 Sep 2018 04:00) (85 - 105)  BP: 112/77 (26 Sep 2018 04:00) (76/61 - 140/73)  BP(mean): 88 (26 Sep 2018 04:00) (76 - 94)  ABP: --  ABP(mean): --  RR: 11 (26 Sep 2018 04:00) (11 - 26)  SpO2: 97% (26 Sep 2018 04:00) (95% - 97%)          I&O's Detail    24 Sep 2018 07:01  -  25 Sep 2018 07:00  --------------------------------------------------------  IN:    Oral Fluid: 120 mL    Other: 550 mL  Total IN: 670 mL    OUT:    Voided: 1320 mL  Total OUT: 1320 mL    Total NET: -650 mL      25 Sep 2018 07:01  -  26 Sep 2018 05:32  --------------------------------------------------------  IN:    Oral Fluid: 580 mL    sodium chloride 0.9%: 100 mL    Sodium Chloride 0.9% IV Bolus: 250 mL  Total IN: 930 mL    OUT:    Voided: 1650 mL  Total OUT: 1650 mL    Total NET: -720 mL            LABS:                        10.1   16.70 )-----------( 548      ( 25 Sep 2018 05:30 )             30.4     09-25    133<L>  |  102  |  31<H>  ----------------------------<  121<H>  4.7   |  26  |  1.08    Ca    7.9<L>      25 Sep 2018 21:24  Phos  3.6     09-25  Mg     2.3     09-25    TPro  6.3  /  Alb  2.3<L>  /  TBili  0.4  /  DBili  x   /  AST  21  /  ALT  78<H>  /  AlkPhos  107  09-25      CARDIAC MARKERS ( 25 Sep 2018 21:24 )  .139 ng/mL / x     / 22 U/L / x     / 0.6 ng/mL  CARDIAC MARKERS ( 25 Sep 2018 05:30 )  .159 ng/mL / x     / 37 U/L / x     / 0.5 ng/mL      CAPILLARY BLOOD GLUCOSE            CULTURES:  Culture Results:   No growth (09-24 @ 21:21)      Physical Examination:    General: No acute distress.      HEENT: Pupils equal, reactive to light.  Symmetric.    PULM: Clear to auscultation bilaterally, no significant sputum production    NECK: Supple, no lymphadenopathy, trachea midline    CVS: Regular rate and rhythm, no murmurs, rubs, or gallops    ABD: Soft, nondistended, nontender, normoactive bowel sounds, no masses    EXT: No edema, nontender    SKIN: sternotomy incision site clean, with discharge, with steri-strips intact. Warm and well perfused, no rashes noted.    NEURO: Alert, oriented, interactive, nonfocal    DEVICES:  none    RADIOLOGY: no recent pertinent radiology to review
Patient is a 78y old  Male who presents with a chief complaint of SOB (26 Sep 2018 09:18)      INTERVAL History of Present Illness/OVERNIGHT EVENTS: overall much improved.  anxious about atrial fibrillation.  await [facility]    MEDICATIONS  (STANDING):  amiodarone    Tablet 400 milliGRAM(s) Oral two times a day  aspirin 325 milliGRAM(s) Oral daily  enoxaparin Injectable 70 milliGRAM(s) SubCutaneous every 12 hours  influenza   Vaccine 0.5 milliLiter(s) IntraMuscular once  metoprolol succinate ER 50 milliGRAM(s) Oral daily    MEDICATIONS  (PRN):  acetaminophen   Tablet .. 650 milliGRAM(s) Oral every 6 hours PRN Temp greater or equal to 38C (100.4F), Moderate Pain (4 - 6)  ALBUTerol/ipratropium for Nebulization 3 milliLiter(s) Nebulizer every 6 hours PRN Wheezing  magnesium hydroxide Suspension 30 milliLiter(s) Oral daily PRN Constipation      Allergies    No Known Allergies    Intolerances        REVIEW OF SYSTEMS:  Negative unless otherwise specified above.    Vital Signs Last 24 Hrs  T(C): 36.4 (26 Sep 2018 04:15), Max: 36.7 (25 Sep 2018 12:46)  T(F): 97.5 (26 Sep 2018 04:15), Max: 98.1 (25 Sep 2018 12:46)  HR: 94 (26 Sep 2018 10:00) (85 - 105)  BP: 108/75 (26 Sep 2018 10:00) (88/70 - 140/73)  BP(mean): 86 (26 Sep 2018 10:00) (77 - 94)  RR: 23 (26 Sep 2018 10:00) (11 - 26)  SpO2: 95% (26 Sep 2018 10:00) (94% - 97%)        PHYSICAL EXAM:  GENERAL: No apparent distress  HEAD:  Atraumatic, Normocephalic  EYES: conjunctiva and sclera clear  ENMT: Moist mucous membranes  NECK: No JVD   CHEST/LUNG: Bibasilar rales, improved aeration  HEART: Irregular rate and rhythm  ABDOMEN: Soft, Nontender, Nondistended; Bowel sounds present  EXTREMITIES:  No clubbing, cyanosis or edema  SKIN: No rashes or lesions  NERVOUS SYSTEM:  Alert & Oriented X3; Bilateral Lower extremity mobile, sensation to light touch intact      LABS:                        10.5   7.98  )-----------( 715      ( 26 Sep 2018 10:00 )             32.2     26 Sep 2018 05:48    135    |  104    |  25     ----------------------------<  106    4.2     |  29     |  1.03     Ca    7.8        26 Sep 2018 05:48  Phos  3.6       25 Sep 2018 22:18  Mg     2.4       26 Sep 2018 05:48    TPro  6.1    /  Alb  2.3    /  TBili  0.4    /  DBili  x      /  AST  18     /  ALT  57     /  AlkPhos  103    26 Sep 2018 05:48        CAPILLARY BLOOD GLUCOSE          RADIOLOGY & ADDITIONAL TESTS:    Images reviewed personally    Consultant Notes Reviewed and Care Discussed with relevant Consultants/Other Providers.
Patient is a 78y old  Male who presents with a chief complaint of SOB (27 Sep 2018 10:36)      INTERVAL History of Present Illness/OVERNIGHT EVENTS: remains in sinus.  eager to go home.      MEDICATIONS  (STANDING):  amiodarone    Tablet 200 milliGRAM(s) Oral two times a day  aspirin 325 milliGRAM(s) Oral daily  atorvastatin 10 milliGRAM(s) Oral at bedtime  docusate sodium 100 milliGRAM(s) Oral three times a day  enoxaparin Injectable 70 milliGRAM(s) SubCutaneous every 12 hours  influenza   Vaccine 0.5 milliLiter(s) IntraMuscular once  metoprolol succinate ER 75 milliGRAM(s) Oral daily  warfarin 5 milliGRAM(s) Oral daily    MEDICATIONS  (PRN):  acetaminophen   Tablet .. 650 milliGRAM(s) Oral every 6 hours PRN Temp greater or equal to 38C (100.4F), Moderate Pain (4 - 6)  ALBUTerol/ipratropium for Nebulization 3 milliLiter(s) Nebulizer every 6 hours PRN Wheezing  magnesium hydroxide Suspension 30 milliLiter(s) Oral daily PRN Constipation      Allergies    No Known Allergies    Intolerances        REVIEW OF SYSTEMS:  Negative unless otherwise specified above.    Vital Signs Last 24 Hrs  T(C): 36.7 (27 Sep 2018 08:57), Max: 36.9 (27 Sep 2018 06:15)  T(F): 98.1 (27 Sep 2018 08:57), Max: 98.4 (27 Sep 2018 06:15)  HR: 68 (27 Sep 2018 08:22) (62 - 665)  BP: 112/72 (27 Sep 2018 08:22) (86/53 - 144/87)  BP(mean): 84 (27 Sep 2018 08:22) (66 - 104)  RR: 25 (27 Sep 2018 08:22) (16 - 28)  SpO2: 98% (27 Sep 2018 08:22) (92% - 100%)        PHYSICAL EXAM:  GENERAL: No apparent distress  HEAD:  Atraumatic, Normocephalic  EYES: conjunctiva and sclera clear  ENMT: Moist mucous membranes  NECK: Supple  CHEST/LUNG: Clear to auscultation bilaterally, midline chest scar  HEART: Regular rate and rhythm  ABDOMEN: Soft, Nontender, Nondistended; Bowel sounds present  EXTREMITIES:  No clubbing, cyanosis or edema  SKIN: No rashes or lesions  NERVOUS SYSTEM:  Alert & Oriented X3; Bilateral Lower extremity mobile, sensation to light touch intact      LABS:                        11.3   7.52  )-----------( 762      ( 27 Sep 2018 06:53 )             34.1     27 Sep 2018 06:53    137    |  105    |  20     ----------------------------<  95     5.2     |  27     |  1.08     Ca    8.5        27 Sep 2018 06:53    TPro  5.8    /  Alb  2.5    /  TBili  0.5    /  DBili  x      /  AST  16     /  ALT  49     /  AlkPhos  105    27 Sep 2018 06:53    PT/INR - ( 27 Sep 2018 06:58 )   PT: 12.5 sec;   INR: 1.14 ratio         PTT - ( 27 Sep 2018 06:58 )  PTT:32.1 sec    CAPILLARY BLOOD GLUCOSE          RADIOLOGY & ADDITIONAL TESTS:    Images reviewed personally    Consultant Notes Reviewed and Care Discussed with relevant Consultants/Other Providers.

## 2018-09-27 NOTE — DISCHARGE NOTE ADULT - CARE PROVIDER_API CALL
Palla, Venugopal R (MD), Cardiovascular Disease; Internal Medicine  [address], [place] [id number]  Phone: [phone]  Fax: [phone]

## 2018-09-27 NOTE — DISCHARGE NOTE ADULT - MEDICATION SUMMARY - MEDICATIONS TO STOP TAKING
I will STOP taking the medications listed below when I get home from the hospital:    amLODIPine 10 mg oral tablet  -- 1 tab(s) by mouth once a day    HYDROcodone-acetaminophen 5 mg-300 mg oral tablet  -- 1  by mouth every 4 hours, As Needed

## 2018-09-27 NOTE — DISCHARGE NOTE ADULT - MEDICATION SUMMARY - MEDICATIONS TO CHANGE
I will SWITCH the dose or number of times a day I take the medications listed below when I get home from the hospital:    losartan 100 mg oral tablet  -- 1 tab(s) by mouth once a day    amiodarone 400 mg oral tablet  -- 1 tab(s) by mouth once a day    aspirin 325 mg oral tablet  -- 1 tab(s) by mouth once a day

## 2018-09-27 NOTE — PROGRESS NOTE ADULT - ASSESSMENT
Pt with recent aortic arch replacement, presents with sob. Hi d-dimer. Neg. procalcitonin, bnp. Echo ok.  Hypoxia due to, atelectasis, pleural effusion.  No evidence CHF, but may have been fluid overloaded. No evidence pneumonia.   Hx mild asthma, on beta blocker.  Breathing markedly improved with thoracentesis.  Now having paroxysmal At fib, rate ok.  Had successful  cardioversion and , is being coumadinized.    Pt. to see me in office next week for fu CXR and PFT.  WIll need to decrease AMIO dose.  Pt to see Dr. Palla for management of INR.
78 male with    1. Acute CHF exacerbation with normal EF/Postoperative state from AAArepair/AVR / right>left pleural effusions: Monitor intake and output on lasix.  Daily weights. f/up cardio recs.  Clinically improved, physical exam improved.  f/up sono chest to evaluate right pleural effusion volume.    2. Paroxysmal A.fib: Aspirin for CVA prevention, Toprol. Currently sinus    3. Asthma/Congenital bullous lung disease: nebs prn.  I reviewed images with patient/wife at bedside.  NO ROLE FOR STEROIDS    4. Elevated d-dimer: no PE on CT angio.    5. Transaminitis: hold statin for now and avoid hepatotoxic meds.  Monitor daily.    6. IMPROVE VTE Individual Risk Assessment          RISK                                                          Points    [  ] Previous VTE                                                3    [  ] Thrombophilia                                             2    [  ] Lower limb paralysis                                    2        (unable to hold up >15 seconds)      [  ] Current Cancer                                             2         (within 6 months)    [  ] Immobilization > 24 hrs                              1    [  ] ICU/CCU stay > 24 hours                            1    [ x ] Age > 60                                                    1    IMPROVE VTE Score 1  Lovenox for dvt ppx  Encouraged ambulation    7. Dispo: home in 1-2 days hopefully.  Plan of care d/w RN, consultants  spent 45 mins
78 male with    1. Postoperative state from AAA/aortic arch aneurysm repair/AVR bovine/bilateral pleural effusions (improved):   Clinically improved, physical exam improved.  s/p right thoracentesis 550cc transudative fluid removed.  Euvolemic.    2. Recurrent A.fib: prior h/o DCCV*3  Aspirin for CVA prevention, Toprol, Amiodarone.   f/up cardio recs.  CHADS2 score is 2 - decision on coumadin as per patient/cardiology.  NPO for DCCV.    3. Congenital bullous lung disease: nebs prn.  Room air sats 94%    4. Transaminitis: resolved. Resume low dose Lipitor.    5. IMPROVE VTE Individual Risk Assessment          RISK                                                          Points    [  ] Previous VTE                                                3    [  ] Thrombophilia                                             2    [  ] Lower limb paralysis                                    2        (unable to hold up >15 seconds)      [  ] Current Cancer                                             2         (within 6 months)    [  ] Immobilization > 24 hrs                              1    [  ] ICU/CCU stay > 24 hours                            1    [ x ] Age > 60                                                    1    IMPROVE VTE Score 1  Lovenox as per cardiology.  Encouraged ambulation    7. Dispo: home when cleared by cardiology.  Plan of care d/w RN, consultants  spent 45 mins
78 male with    1. Postoperative state from AAA/aortic arch aneurysm repair/AVR bovine/bilateral pleural effusions (improved):   Clinically improved, physical exam improved.  s/p right thoracentesis 560cc transudative fluid removed.  Euvolemic.  Outpatient f/up with Dr. Rushing October 12.    2. Recurrent A.fib: s/p successful DCCV.  Aspirin for CVA prevention, Toprol, Amiodarone.   Coumadin initiated and will be monitored by cardiology.    3. Congenital bullous lung disease: nebs prn.  Room air sats 94%    4. Transaminitis: resolved. Resumed low dose Lipitor.    5. IMPROVE VTE Individual Risk Assessment          RISK                                                          Points    [  ] Previous VTE                                                3    [  ] Thrombophilia                                             2    [  ] Lower limb paralysis                                    2        (unable to hold up >15 seconds)      [  ] Current Cancer                                             2         (within 6 months)    [  ] Immobilization > 24 hrs                              1    [  ] ICU/CCU stay > 24 hours                            1    [ x ] Age > 60                                                    1    IMPROVE VTE Score 1  Encouraged ambulation    7. Dispo: home today.  Plan of care d/w RN, Dr. Palla  spent 45 mins
78 male with    1. Postoperative state from AAA/aortic arch aneurysm repair/AVR bovine/bilateral pleural effusions:   Clinically improved, physical exam improved.  s/p right thoracentesis 550cc transudative fluid removed.  Appears dehydrated this AM - given fluid bolus for episode of hypotension.    2. Paroxysmal A.fib: Aspirin for CVA prevention, Toprol, Amiodarone.   f/up cardio recs.  CHADS2 score is 2 - decision on coumadin as per patient/cardiology.    3. Congenital bullous lung disease: nebs prn.  I reviewed images with patient/wife at bedside.  Room air sats 93-94% now.    4. Transaminitis: hold statin for now and avoid hepatotoxic meds.  Monitor daily.    5. IMPROVE VTE Individual Risk Assessment          RISK                                                          Points    [  ] Previous VTE                                                3    [  ] Thrombophilia                                             2    [  ] Lower limb paralysis                                    2        (unable to hold up >15 seconds)      [  ] Current Cancer                                             2         (within 6 months)    [  ] Immobilization > 24 hrs                              1    [  ] ICU/CCU stay > 24 hours                            1    [ x ] Age > 60                                                    1    IMPROVE VTE Score 1  Lovenox for dvt ppx  Encouraged ambulation    7. Dispo: home in 1-2 days hopefully.  Plan of care d/w RN, consultants  spent 45 mins
78 year old male PMHx. HTN, HLD, PAF ( prior DCCV) BPH, Recent Aortic Arch Rpr with Bio-AVR and L Atrial Appendage closure (Lakhwinder on 9/12/2018). Post-op course complicated by AF w/rvr  and CHF requiring initiation of Amiodarone and Lasix. Presented to  ED 9/23 with SOB.  Rx with CHF.  CTA was Neg for PE but revealed Pulmonary venous Congestion and Small B/L effusions.  S/P Thoracentesis of the larger R Effusion 9/24/2018.    Tonight with NSR to AF conversion HR varied       On Amio 400QD / Metoporal ER 50QD / Norvasc    Additional Amio 150mg IVPB x 1   Additional B'Blocker 5mg Lopressor given   EKG AF No ST changes from Prior   Asymptomatic throughout  remains in AF rate controlled
78M S/P AAA repair, complicated by rapid afib at [place]. Admitted to [facility] with CHF exacerbation.
79 y/o male with pmhx of HTN, HLD, paroxysmal afib s/p 2 DCCV, thoracic aortic aneurysm s/p recent aortic arch repair with biologic aortic valve repair and left atrial appendage closure now with acute exacerbation of CHF that has resolved with hospital course now complicated by conversion from NSR back to afib, rate controlled.
Pt with recent aortic arch replacement, presents with sob. Hi d-dimer. Neg. procalcitonin, bnp. Echo ok.  Hypoxia due to, atelectasis, pleural effusion.  No evidence CHF, but may have been fluid overloaded. No evidence pneumonia.   Hx mild asthma, on beta blocker.
Pt with recent aortic arch replacement, presents with sob. Hi d-dimer. Neg. procalcitonin, bnp. Echo ok.  Hypoxia due to, atelectasis, pleural effusion.  No evidence CHF, but may have been fluid overloaded. No evidence pneumonia.   Hx mild asthma, on beta blocker.  Breathing markedly improved with thoracentesis.  No having paroxysmal At fib, rate ok.
Pt with recent aortic arch replacement, presents with sob. Hi d-dimer. Neg. procalcitonin, bnp. Echo ok.  Hypoxia due to, atelectasis, pleural effusion.  No evidence CHF, but may have been fluid overloaded. No evidence pneumonia.   Hx mild asthma, on beta blocker.  Breathing markedly improved with thoracentesis.  Now having paroxysmal At fib, rate ok.    Will need cardioversion and AC.

## 2018-09-27 NOTE — PROGRESS NOTE ADULT - PROBLEM SELECTOR PROBLEM 4
Essential hypertension
Thoracic aortic aneurysm without rupture
Asthma
Pleural effusion
Thoracic aortic aneurysm without rupture
Thoracic aortic aneurysm without rupture

## 2018-09-27 NOTE — PROGRESS NOTE ADULT - PROVIDER SPECIALTY LIST ADULT
Cardiology
Critical Care
Hospitalist
Critical Care

## 2018-09-27 NOTE — PROGRESS NOTE ADULT - PROBLEM SELECTOR PROBLEM 3
Acute systolic congestive heart failure
Paroxysmal atrial fibrillation
Acute systolic congestive heart failure
Essential hypertension
Thoracic aortic aneurysm without rupture

## 2018-09-27 NOTE — PROGRESS NOTE ADULT - PROBLEM SELECTOR PLAN 2
Diuresed  Us venous doppler legs neg
Hemodynamically stable
-EKG performed and reviewed by cardiology. poor R wave progression in anterior leads suspicious for anterior wall ischemia but cardiology attributed to post op changes as per patient.  -repeated cardiac enzymes which continue to be negative.  -low threshold to repeat EKG and enzymes  -pain may have been anxiety related as patient states he is nervous about being in the hospital soon after his surgery.
-rate controlled with beta blocker and amiodarone
Diurese  Us venous doppler legs neg
Diuresed  Us venous doppler legs neg
Stable, no obvious failure

## 2018-09-27 NOTE — PROGRESS NOTE ADULT - PROBLEM SELECTOR PLAN 1
Cardio fu  Close monitoring of INR while on  warfarin  See Dr. Velez in office
Improved. Thoracentesis planned today. Continue to monitor
Improved. s/p thoracentesis Continue to monitor
Improved. s/p thoracentesis Continue to monitor
-diurese, lasix BID, daily BMP while diuresing  -echo with normal EF%  -0n Beta blocker and ACE
-patient already bolused with amiodarone and is on 400 mg BID as well as toprol 50 daily  -as per cardiology patient will likely go for ablation if remains in afib. NPO after midnight.  -continue full dose lovenox for anticoagulation
Cardio fu  ?AC
Cardio fu  ?AC warfarin  Cardioversion Dr. Velez
Diurese  Us venous doppler legs

## 2018-09-27 NOTE — PROGRESS NOTE ADULT - PROBLEM SELECTOR PROBLEM 1
Dyspnea, unspecified type
R/O Paroxysmal atrial fibrillation
Acute systolic congestive heart failure
Dyspnea and respiratory abnormalities
Paroxysmal atrial fibrillation
R/O Paroxysmal atrial fibrillation
R/O Paroxysmal atrial fibrillation

## 2018-09-27 NOTE — DISCHARGE NOTE ADULT - CARE PLAN
Principal Discharge DX:	Atrial fibrillation status post cardioversion  Goal:	avoid recurrence  Assessment and plan of treatment:	Meds/INR monitoring as per Chano Lopez/Palla.  Secondary Diagnosis:	Essential hypertension  Assessment and plan of treatment:	Blood pressure meds with hold parameters  Secondary Diagnosis:	HLD (hyperlipidemia)  Assessment and plan of treatment:	Lipitor 10  Secondary Diagnosis:	Pleural effusion  Assessment and plan of treatment:	Right thoracentesis 560cc serosanguinous fluid removed 9/24  Secondary Diagnosis:	Thoracic aortic aneurysm without rupture  Assessment and plan of treatment:	s/p thoracic surgery 9/24

## 2018-09-27 NOTE — PROGRESS NOTE ADULT - NSHPATTENDINGPLANDISCUSS_GEN_ALL_CORE
Dr Rushing s office NP , dr moisés LEDESMA
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
MITA MONTALVO and nursing in detail.
MITA Wiley  and nursing in detail.
MITA LOPEZ, wife and nursing in detail.
MITA Wiley, wife and nursing in detail.

## 2018-09-27 NOTE — PROGRESS NOTE ADULT - PROBLEM SELECTOR PLAN 3
Now in NSR.
Stable, no obvious failure
recurrence of afib ,  patient had Left atrial appendage resection ,  will give One dose Of IV Amiodarone , digoxin , will give magnesium dose , IV fluid   if does not convert by tomorrow , will cardiovert     ( dr olson discussed with cardiology from surgeon office )
recurrence of afib , now in sinus rhythm  after cardioversion   will give ecotrin  81 mg , start anticoagulation  warfarin 5 mg  po daily  after discussion with CT surgeon ( discussed with NP over the phone )      patient had Left atrial appendage resection ,      ( dr olson discussed with cardiology from surgeon office )
-on ACE and and norvasc
-resolved. Discontinued maintenance fluids of 100 cc/hr as patients BP has normalized with good urine output and at risk for putting patient back in to pulmonary edema with history of heart failure. EF 70 % on recent TTE
Stable, no obvious failure
Stable, no obvious failure
observe

## 2018-09-27 NOTE — PROGRESS NOTE ADULT - ATTENDING COMMENTS
Extensive discussion with wife, Dr. mcbride, Dr. Gary, Dr. Lopez  D/w PA in detail.
Extensive discussion with wife, and PA

## 2018-09-27 NOTE — DISCHARGE NOTE ADULT - MEDICATION SUMMARY - MEDICATIONS TO TAKE
I will START or STAY ON the medications listed below when I get home from the hospital:    Aspir 81 oral delayed release tablet  -- 1 tab(s) by mouth once a day   -- Swallow whole.  Do not crush.  Take with food or milk.    -- Indication: For Stroke prevention    losartan 25 mg oral tablet  -- 1 tab(s) by mouth once a day. hold for SBP<100  -- Do not take this drug if you are pregnant.  It is very important that you take or use this exactly as directed.  Do not skip doses or discontinue unless directed by your doctor.  Some non-prescription drugs may aggravate your condition.  Read all labels carefully.  If a warning appears, check with your doctor before taking.    -- Indication: For HTN    Pacerone 200 mg oral tablet  -- 1 tab(s) by mouth 2 times a day. dosing as per cardiology.  -- Indication: For Paroxysmal atrial fibrillation    Coumadin 5 mg oral tablet  -- 1 tab(s) by mouth once a day. Goal INR 2-3 for P.A.fib  -- Indication: For Paroxysmal atrial fibrillation    Lipitor 10 mg oral tablet  -- 1 tab(s) by mouth once a day  -- Indication: For Stroke prevention    metoprolol succinate 50 mg oral tablet, extended release  -- 1 tab(s) by mouth 2 times a day   -- Indication: For Paroxysmal atrial fibrillation    Colace 100 mg oral capsule  -- orally 3 times a day  -- Indication: For Constipation    Senna  -- 17.2 milligram(s) by mouth once a day (at bedtime)  -- Indication: For Constipation

## 2018-09-27 NOTE — DISCHARGE NOTE ADULT - ADDITIONAL INSTRUCTIONS
Patient has appt with Dr. Rushing October 12.  Patient will be followed by Chano Lopez/Palla closely as outpatient.

## 2018-09-27 NOTE — PROGRESS NOTE ADULT - PROBLEM SELECTOR PLAN 5
IR thoracentesis right successful--transudate  Overall improved symptoms with thoracentesis
IR thoracentesis right successful--transudate
IR thoracentesis right successful--transudate  Overall improved symptoms with thoracentesis
Solumedrol  Symbicort  N

## 2018-09-27 NOTE — DISCHARGE NOTE ADULT - PATIENT PORTAL LINK FT
You can access the [facility], offered by [facility], by registering with the following website: [email or web address]

## 2018-09-29 LAB
CULTURE RESULTS: SIGNIFICANT CHANGE UP
SPECIMEN SOURCE: SIGNIFICANT CHANGE UP

## 2018-10-01 LAB
ALBUMIN SERPL ELPH-MCNC: 3.8 G/DL
ALP BLD-CCNC: 109 U/L
ALT SERPL-CCNC: 66 U/L
ANION GAP SERPL CALC-SCNC: 15 MMOL/L
AST SERPL-CCNC: 29 U/L
BILIRUB SERPL-MCNC: 0.3 MG/DL
BUN SERPL-MCNC: 19 MG/DL
CALCIUM SERPL-MCNC: 8.6 MG/DL
CHLORIDE SERPL-SCNC: 100 MMOL/L
CO2 SERPL-SCNC: 22 MMOL/L
CREAT SERPL-MCNC: 1.09 MG/DL
GLUCOSE SERPL-MCNC: 109 MG/DL
NT-PROBNP SERPL-MCNC: 716 PG/ML
POTASSIUM SERPL-SCNC: 5.2 MMOL/L
PROT SERPL-MCNC: 6.4 G/DL
SODIUM SERPL-SCNC: 137 MMOL/L

## 2018-10-02 ENCOUNTER — MEDICATION RENEWAL (OUTPATIENT)
Age: 78
End: 2018-10-02

## 2018-10-02 ENCOUNTER — RESULT REVIEW (OUTPATIENT)
Age: 78
End: 2018-10-02

## 2018-10-04 ENCOUNTER — APPOINTMENT (OUTPATIENT)
Dept: CARDIOLOGY | Facility: CLINIC | Age: 78
End: 2018-10-04
Payer: MEDICARE

## 2018-10-04 ENCOUNTER — NON-APPOINTMENT (OUTPATIENT)
Age: 78
End: 2018-10-04

## 2018-10-04 VITALS
DIASTOLIC BLOOD PRESSURE: 55 MMHG | SYSTOLIC BLOOD PRESSURE: 102 MMHG | HEIGHT: 68 IN | HEART RATE: 68 BPM | BODY MASS INDEX: 24.86 KG/M2 | OXYGEN SATURATION: 97 % | WEIGHT: 164 LBS

## 2018-10-04 LAB
INR PPP: 2 RATIO
POCT-PROTHROMBIN TIME: 24.5 SECS
QUALITY CONTROL: YES

## 2018-10-04 PROCEDURE — 85610 PROTHROMBIN TIME: CPT | Mod: QW

## 2018-10-04 PROCEDURE — 99214 OFFICE O/P EST MOD 30 MIN: CPT | Mod: 25

## 2018-10-04 PROCEDURE — 93000 ELECTROCARDIOGRAM COMPLETE: CPT

## 2018-10-08 ENCOUNTER — LABORATORY RESULT (OUTPATIENT)
Age: 78
End: 2018-10-08

## 2018-10-08 ENCOUNTER — RESULT REVIEW (OUTPATIENT)
Age: 78
End: 2018-10-08

## 2018-10-11 ENCOUNTER — LABORATORY RESULT (OUTPATIENT)
Age: 78
End: 2018-10-11

## 2018-10-11 ENCOUNTER — RESULT REVIEW (OUTPATIENT)
Age: 78
End: 2018-10-11

## 2018-10-15 ENCOUNTER — RESULT REVIEW (OUTPATIENT)
Age: 78
End: 2018-10-15

## 2018-10-15 ENCOUNTER — LABORATORY RESULT (OUTPATIENT)
Age: 78
End: 2018-10-15

## 2018-10-16 LAB
INR PPP: 1.61 RATIO
PT BLD: 18.4 SEC

## 2018-10-18 ENCOUNTER — LABORATORY RESULT (OUTPATIENT)
Age: 78
End: 2018-10-18

## 2018-10-19 ENCOUNTER — MEDICATION RENEWAL (OUTPATIENT)
Age: 78
End: 2018-10-19

## 2018-10-23 ENCOUNTER — LABORATORY RESULT (OUTPATIENT)
Age: 78
End: 2018-10-23

## 2018-10-23 ENCOUNTER — RESULT REVIEW (OUTPATIENT)
Age: 78
End: 2018-10-23

## 2018-10-29 ENCOUNTER — RESULT REVIEW (OUTPATIENT)
Age: 78
End: 2018-10-29

## 2018-10-29 ENCOUNTER — LABORATORY RESULT (OUTPATIENT)
Age: 78
End: 2018-10-29

## 2018-11-02 ENCOUNTER — NON-APPOINTMENT (OUTPATIENT)
Age: 78
End: 2018-11-02

## 2018-11-02 ENCOUNTER — RESULT REVIEW (OUTPATIENT)
Age: 78
End: 2018-11-02

## 2018-11-02 ENCOUNTER — APPOINTMENT (OUTPATIENT)
Dept: CARDIOLOGY | Facility: CLINIC | Age: 78
End: 2018-11-02
Payer: MEDICARE

## 2018-11-02 VITALS
DIASTOLIC BLOOD PRESSURE: 66 MMHG | HEIGHT: 68 IN | SYSTOLIC BLOOD PRESSURE: 114 MMHG | WEIGHT: 170 LBS | HEART RATE: 54 BPM | BODY MASS INDEX: 25.76 KG/M2 | OXYGEN SATURATION: 100 %

## 2018-11-02 LAB
INR PPP: 1.8 RATIO
POCT-PROTHROMBIN TIME: 21.3 SECS
QUALITY CONTROL: YES

## 2018-11-02 PROCEDURE — 99214 OFFICE O/P EST MOD 30 MIN: CPT | Mod: 25

## 2018-11-02 PROCEDURE — 93000 ELECTROCARDIOGRAM COMPLETE: CPT

## 2018-11-02 PROCEDURE — 85610 PROTHROMBIN TIME: CPT | Mod: QW

## 2018-11-02 NOTE — REVIEW OF SYSTEMS
[Fever] : no fever [Earache] : no earache [Mouth Sores] : no mouth sores [Shortness Of Breath] : no shortness of breath [Palpitations] : no palpitations [Cough] : no cough [Abdominal Pain] : no abdominal pain [Heartburn] : no heartburn [Urinary Frequency] : no change in urinary frequency [Joint Pain] : no joint pain [Skin: A Rash] : no rash: [Skin Lesions] : no skin lesions [Confusion] : no confusion was observed [Easy Bleeding] : no tendency for easy bleeding [Negative] : Heme/Lymph

## 2018-11-02 NOTE — HISTORY OF PRESENT ILLNESS
[FreeTextEntry1] : 78-year-old male with history of PAF HTN s/p GIORGIO cardioversion  twice in 2011 , 2015  , 2018, HTN, asthma    who came for follow up  after hospitalization  , Patient  had surgery for AAA , aortic root, valve , arch,  close BLESSING ,9/13/18 , with post recurrence afib , was on amiodarone loading dose , again another hospitalization in [facility]  with recurrence of afib , Patient was cardioverted sinus rhythm , was started on warfarin , patient is doing well , some time he gets wheezing with  mild sob  which improves albuterol \par \par now he is feeling fine , denies any chest pain or shortness of breath or dizziness , \par \par INR 1.8 on 5 mg po daily \par \par \par \par \par \par \par annual MRI HAS BEEN STABLE

## 2018-11-02 NOTE — DISCUSSION/SUMMARY
[Paroxysmal Atrial Fibrillation] : paroxysmal atrial fibrillation [Rhythm Control] : rhythm control [Anticoagulation] : anticoagulation [Episodic Cardioversion] : episodic cardioversion [Continue] : continuing beta blockers [Hyperlipidemia] : hyperlipidemia [None] : none [Diet Modification] : diet modification [Exercise] : exercise [Weight Loss] : weight loss [Atrial Fibrillation] : atrial fibrillation [Stable] : stable [Serum Electrolytes] : serum electrolytes [Thyroid Function Tests] : thyroid function tests [Holter Monitor] : a Holter monitor [Echocardiogram] : an echocardiogram [Patient] : the patient [de-identified] :  toprol 50 mg daily. [de-identified] : low CHADS score,will not anticoagulate [FreeTextEntry1] : Patient with above hx \par \par s/p ascending aorta , arch  reimplantation of coronaries , BLESSING occlusion with palpitation , recurrent atrial fibrillation ,  s/p cardioversion   now in sinus rhythm bradycardia ,  continue BB and AC  amiodarone ,   INR   warfarin 7.5 mg twice a week , remaining days 5 mg    \par \par HTN . controlled ,continue current medication \par \par \par asthma : controlled with  inhalers , \par \par \par follow up after 4 weeks   monitor INR \par

## 2018-11-02 NOTE — PHYSICAL EXAM
[General Appearance - Well Developed] : well developed [Well Groomed] : well groomed [General Appearance - Well Nourished] : well nourished [No Deformities] : no deformities [General Appearance - In No Acute Distress] : no acute distress [Normal Conjunctiva] : the conjunctiva exhibited no abnormalities [Normal Oral Mucosa] : normal oral mucosa [] : no respiratory distress [Respiration, Rhythm And Depth] : normal respiratory rhythm and effort [Exaggerated Use Of Accessory Muscles For Inspiration] : no accessory muscle use [Auscultation Breath Sounds / Voice Sounds] : lungs were clear to auscultation bilaterally [Chest Palpation] : palpation of the chest revealed no abnormalities [Lungs Percussion] : the lungs were normal to percussion [Heart Rate And Rhythm] : heart rate and rhythm were normal [Heart Sounds] : normal S1 and S2 [Arterial Pulses Normal] : the arterial pulses were normal [Irregularly Irregular] : the rhythm was irregularly irregular [Systolic grade ___/6] : A grade [unfilled]/6 systolic murmur was heard. [Bowel Sounds] : normal bowel sounds [Abdomen Soft] : soft [Abdomen Tenderness] : non-tender [Abdomen Mass (___ Cm)] : no abdominal mass palpated [Abnormal Walk] : normal gait [Gait - Sufficient For Exercise Testing] : the gait was sufficient for exercise testing [Nail Clubbing] : no clubbing of the fingernails [FreeTextEntry1] : No LE Edema  [Skin Color & Pigmentation] : normal skin color and pigmentation [Oriented To Time, Place, And Person] : oriented to person, place, and time [Normal Appearance] : was normal in appearance [Neck Supple] : was supple [Normal] : the thyroid was normal

## 2018-11-02 NOTE — REASON FOR VISIT
[Follow-Up - Clinic] : a clinic follow-up of [Atrial Fibrillation] : atrial fibrillation [Hyperlipidemia] : hyperlipidemia [Hypertension] : hypertension [Medication Management] : Medication management [Palpitations] : palpitations [FreeTextEntry1] : Ascending aortic aneurysm.

## 2018-11-05 LAB
25(OH)D3 SERPL-MCNC: 25.8 NG/ML
ALBUMIN SERPL ELPH-MCNC: 4.2 G/DL
ALP BLD-CCNC: 84 U/L
ALT SERPL-CCNC: 16 U/L
ANION GAP SERPL CALC-SCNC: 13 MMOL/L
AST SERPL-CCNC: 18 U/L
BASOPHILS # BLD AUTO: 0.03 K/UL
BASOPHILS NFR BLD AUTO: 0.6 %
BILIRUB SERPL-MCNC: 0.3 MG/DL
BUN SERPL-MCNC: 21 MG/DL
CALCIUM SERPL-MCNC: 9 MG/DL
CHLORIDE SERPL-SCNC: 101 MMOL/L
CHOLEST SERPL-MCNC: 248 MG/DL
CHOLEST/HDLC SERPL: 3.1 RATIO
CO2 SERPL-SCNC: 26 MMOL/L
CREAT SERPL-MCNC: 1.21 MG/DL
EOSINOPHIL # BLD AUTO: 0.07 K/UL
EOSINOPHIL NFR BLD AUTO: 1.5 %
GLUCOSE SERPL-MCNC: 80 MG/DL
HBA1C MFR BLD HPLC: 5.1 %
HCT VFR BLD CALC: 39.7 %
HDLC SERPL-MCNC: 79 MG/DL
HGB BLD-MCNC: 13.3 G/DL
IMM GRANULOCYTES NFR BLD AUTO: 0.2 %
LDLC SERPL CALC-MCNC: 151 MG/DL
LYMPHOCYTES # BLD AUTO: 1.23 K/UL
LYMPHOCYTES NFR BLD AUTO: 26.6 %
MAN DIFF?: NORMAL
MCHC RBC-ENTMCNC: 30.1 PG
MCHC RBC-ENTMCNC: 33.5 GM/DL
MCV RBC AUTO: 89.8 FL
MONOCYTES # BLD AUTO: 0.26 K/UL
MONOCYTES NFR BLD AUTO: 5.6 %
NEUTROPHILS # BLD AUTO: 3.02 K/UL
NEUTROPHILS NFR BLD AUTO: 65.5 %
PLATELET # BLD AUTO: 306 K/UL
POTASSIUM SERPL-SCNC: 4.9 MMOL/L
PROT SERPL-MCNC: 7.3 G/DL
RBC # BLD: 4.42 M/UL
RBC # FLD: 14.9 %
SODIUM SERPL-SCNC: 140 MMOL/L
TRIGL SERPL-MCNC: 90 MG/DL
TSH SERPL-ACNC: 2.89 UIU/ML
WBC # FLD AUTO: 4.62 K/UL

## 2018-11-06 ENCOUNTER — LABORATORY RESULT (OUTPATIENT)
Age: 78
End: 2018-11-06

## 2018-11-06 ENCOUNTER — RESULT REVIEW (OUTPATIENT)
Age: 78
End: 2018-11-06

## 2018-11-14 LAB
CULTURE RESULTS: SIGNIFICANT CHANGE UP
SPECIMEN SOURCE: SIGNIFICANT CHANGE UP

## 2018-11-15 ENCOUNTER — RESULT REVIEW (OUTPATIENT)
Age: 78
End: 2018-11-15

## 2018-11-15 ENCOUNTER — LABORATORY RESULT (OUTPATIENT)
Age: 78
End: 2018-11-15

## 2018-12-03 ENCOUNTER — LABORATORY RESULT (OUTPATIENT)
Age: 78
End: 2018-12-03

## 2018-12-03 ENCOUNTER — RESULT REVIEW (OUTPATIENT)
Age: 78
End: 2018-12-03

## 2019-01-10 ENCOUNTER — NON-APPOINTMENT (OUTPATIENT)
Age: 79
End: 2019-01-10

## 2019-01-10 ENCOUNTER — APPOINTMENT (OUTPATIENT)
Dept: CARDIOLOGY | Facility: CLINIC | Age: 79
End: 2019-01-10
Payer: MEDICARE

## 2019-01-10 VITALS — HEIGHT: 68 IN | WEIGHT: 178 LBS | BODY MASS INDEX: 26.98 KG/M2

## 2019-01-10 VITALS — OXYGEN SATURATION: 98 % | SYSTOLIC BLOOD PRESSURE: 145 MMHG | HEART RATE: 51 BPM | DIASTOLIC BLOOD PRESSURE: 69 MMHG

## 2019-01-10 VITALS — SYSTOLIC BLOOD PRESSURE: 110 MMHG | DIASTOLIC BLOOD PRESSURE: 70 MMHG

## 2019-01-10 PROCEDURE — 93000 ELECTROCARDIOGRAM COMPLETE: CPT

## 2019-01-10 PROCEDURE — 93306 TTE W/DOPPLER COMPLETE: CPT

## 2019-01-10 PROCEDURE — 99214 OFFICE O/P EST MOD 30 MIN: CPT | Mod: 25

## 2019-01-10 NOTE — PHYSICAL EXAM
[General Appearance - Well Developed] : well developed [Well Groomed] : well groomed [General Appearance - Well Nourished] : well nourished [No Deformities] : no deformities [General Appearance - In No Acute Distress] : no acute distress [Normal Conjunctiva] : the conjunctiva exhibited no abnormalities [Normal Oral Mucosa] : normal oral mucosa [] : no respiratory distress [Respiration, Rhythm And Depth] : normal respiratory rhythm and effort [Exaggerated Use Of Accessory Muscles For Inspiration] : no accessory muscle use [Auscultation Breath Sounds / Voice Sounds] : lungs were clear to auscultation bilaterally [Chest Palpation] : palpation of the chest revealed no abnormalities [Lungs Percussion] : the lungs were normal to percussion [Heart Rate And Rhythm] : heart rate and rhythm were normal [Heart Sounds] : normal S1 and S2 [Arterial Pulses Normal] : the arterial pulses were normal [Edema] : no peripheral edema present [Veins - Varicosity Changes] : no varicosital changes were noted in the lower extremities [Irregularly Irregular] : the rhythm was irregularly irregular [Systolic grade ___/6] : A grade [unfilled]/6 systolic murmur was heard. [Bowel Sounds] : normal bowel sounds [Abdomen Soft] : soft [Abdomen Tenderness] : non-tender [Abdomen Mass (___ Cm)] : no abdominal mass palpated [Abnormal Walk] : normal gait [Gait - Sufficient For Exercise Testing] : the gait was sufficient for exercise testing [Nail Clubbing] : no clubbing of the fingernails [FreeTextEntry1] : No LE Edema  [Skin Color & Pigmentation] : normal skin color and pigmentation [Oriented To Time, Place, And Person] : oriented to person, place, and time [Normal Appearance] : was normal in appearance [Neck Supple] : was supple [Normal] : the thyroid was normal

## 2019-01-10 NOTE — ED PROVIDER NOTE - CROS ED GI ALL NEG
AM ROUNDS COMPLETED. INTRODUCED MYSELF TO PT AS PRIMARY RN FOR TODAYS SHIFT.
PT IS MORE ORIENTED THIS AM AND ALERT. PT CAN STATE WHERE SHE IS AND SITUATION
AND PERSON BUT DIDNT KNOW TIME, REORIENTS WITHOUT ANY ISSUES. PULLED PT UP IN
BED AND REPOSITIONED FOR COMFORT. PT STILL C/O GENERALIZED PAIN BUT DENIES
NEEDING ANY MEDICATION FOR IT AT THIS TIME. SHIFT ASSESSMENT COMPLETED. NO
CHANGES NOTED FROM YESTERDAY. PT STATES SHE IS NOT A MORNING EATER AND DOESNT
WANT BREAKFAST BUT UNDERSTANDS THE IMPORTANCE RIGHT NOW AND TOOK ABOUT 5-10
BITES OFF HER TRAY FOR ME. PT ALSO WAS ABLE TO DRINK SOME COFFEE AND APPLE
JUICE AND SWALLOWED ALL HER PILLS WITHOUT ANY DIFFICULTIES. PT STATES SHE WILL
TRY TO EAT MORE FOR LUNCH. NO FAMILY HERE CURRENTLY AND PT DENIES ANY FURTHER
NEEDS. CL IN REACH, BED IN LOWEST, SIDE RAILS X2 AND POSEY PAD IN PLACE. WILL
CTM. negative...

## 2019-01-10 NOTE — HISTORY OF PRESENT ILLNESS
[FreeTextEntry1] : 78-year-old male with history of PAF HTN s/p GIORGIO cardioversion  twice in 2011 , 2015  , 2018, HTN, asthma    Patient  had surgery for AAA , aortic root, valve , arch replacement ,  close BLESSING ,9/13/18 , with post recurrence afib ,  Patient was cardioverted sinus rhythm , patient is doing well ,  off of anticoagulation  want to get of of amiodarone \par \par now he is feeling fine , denies any chest pain or shortness of breath or dizziness , \par  \par \par \par \par \par \par \par annual MRI HAS BEEN STABLE

## 2019-01-10 NOTE — DISCUSSION/SUMMARY
[Paroxysmal Atrial Fibrillation] : paroxysmal atrial fibrillation [Rhythm Control] : rhythm control [Anticoagulation] : anticoagulation [Episodic Cardioversion] : episodic cardioversion [Continue] : continuing beta blockers [Hyperlipidemia] : hyperlipidemia [None] : none [Diet Modification] : diet modification [Exercise] : exercise [Weight Loss] : weight loss [Atrial Fibrillation] : atrial fibrillation [Stable] : stable [Serum Electrolytes] : serum electrolytes [Thyroid Function Tests] : thyroid function tests [Holter Monitor] : a Holter monitor [Echocardiogram] : an echocardiogram [Patient] : the patient [de-identified] :  toprol 50 mg daily. [de-identified] : low CHADS score,will not anticoagulate [FreeTextEntry1] : Patient with above hx \par \par s/p ascending aorta , arch  reimplantation of coronaries , BLESSING occlusion with palpitation , recurrent atrial fibrillation ,  s/p cardioversion   now in sinus rhythm bradycardia ,  continue BB , low dose amiodarone , monitor for side effect , anticoagulation discontinued , contiue ecotrin \par \par HTN . controlled ,continue current medication \par \par Hyperlipidemia  continue statin \par \par asthma : controlled with  inhalers , \par \par \par follow up after 3 months \par

## 2019-02-07 LAB
ALBUMIN SERPL ELPH-MCNC: 3.8 G/DL
ALP BLD-CCNC: 62 U/L
ALT SERPL-CCNC: 14 U/L
AST SERPL-CCNC: 17 U/L
BILIRUB DIRECT SERPL-MCNC: 0.1 MG/DL
BILIRUB INDIRECT SERPL-MCNC: 0.3 MG/DL
BILIRUB SERPL-MCNC: 0.4 MG/DL
CHOLEST SERPL-MCNC: 159 MG/DL
CHOLEST/HDLC SERPL: 2.5 RATIO
HDLC SERPL-MCNC: 63 MG/DL
LDLC SERPL CALC-MCNC: 84 MG/DL
PROT SERPL-MCNC: 6.8 G/DL
TRIGL SERPL-MCNC: 61 MG/DL

## 2019-03-03 ENCOUNTER — TRANSCRIPTION ENCOUNTER (OUTPATIENT)
Age: 79
End: 2019-03-03

## 2019-04-11 ENCOUNTER — APPOINTMENT (OUTPATIENT)
Dept: CARDIOLOGY | Facility: CLINIC | Age: 79
End: 2019-04-11
Payer: MEDICARE

## 2019-04-11 ENCOUNTER — NON-APPOINTMENT (OUTPATIENT)
Age: 79
End: 2019-04-11

## 2019-04-11 VITALS
BODY MASS INDEX: 26.67 KG/M2 | SYSTOLIC BLOOD PRESSURE: 117 MMHG | WEIGHT: 176 LBS | DIASTOLIC BLOOD PRESSURE: 67 MMHG | HEART RATE: 50 BPM | OXYGEN SATURATION: 98 % | HEIGHT: 68 IN

## 2019-04-11 PROCEDURE — 99214 OFFICE O/P EST MOD 30 MIN: CPT | Mod: 25

## 2019-04-11 PROCEDURE — 93000 ELECTROCARDIOGRAM COMPLETE: CPT

## 2019-04-11 RX ORDER — AMIODARONE HYDROCHLORIDE 200 MG/1
200 TABLET ORAL EVERY OTHER DAY
Refills: 0 | Status: DISCONTINUED | COMMUNITY
End: 2019-04-11

## 2019-04-11 NOTE — REASON FOR VISIT
[Follow-Up - Clinic] : a clinic follow-up of [Hyperlipidemia] : hyperlipidemia [Atrial Fibrillation] : atrial fibrillation [Hypertension] : hypertension [Medication Management] : Medication management [Palpitations] : palpitations [FreeTextEntry1] : Ascending aortic aneurysm repair

## 2019-04-11 NOTE — HISTORY OF PRESENT ILLNESS
[FreeTextEntry1] : 78-year-old male with history of PAF HTN s/p GIORGIO cardioversion  twice in 2011 , 2015  , 2018, HTN, asthma    Patient  had surgery for AAA , aortic root, valve , arch replacement ,  closed BLESSING ,9/13/18 , with post op recurrence afib ,  Patient was cardioverted sinus rhythm , patient is doing well ,  off of anticoagulation  amiodarone. patient deneis any symptoms suggestive of afib \par \par now he is feeling fine , denies any chest pain or shortness of breath or dizziness , \par  \par \par \par \par \par \par \par annual MRI HAS BEEN STABLE

## 2019-04-11 NOTE — REVIEW OF SYSTEMS
[Fever] : no fever [Mouth Sores] : no mouth sores [Earache] : no earache [Shortness Of Breath] : no shortness of breath [Palpitations] : no palpitations [Cough] : no cough [Abdominal Pain] : no abdominal pain [Heartburn] : no heartburn [Urinary Frequency] : no change in urinary frequency [Skin: A Rash] : no rash: [Joint Pain] : no joint pain [Skin Lesions] : no skin lesions [Confusion] : no confusion was observed [Easy Bleeding] : no tendency for easy bleeding [Negative] : Heme/Lymph

## 2019-04-11 NOTE — DISCUSSION/SUMMARY
[Paroxysmal Atrial Fibrillation] : paroxysmal atrial fibrillation [Rhythm Control] : rhythm control [Anticoagulation] : anticoagulation [Episodic Cardioversion] : episodic cardioversion [Continue] : continuing beta blockers [Hyperlipidemia] : hyperlipidemia [None] : none [Diet Modification] : diet modification [Exercise] : exercise [Weight Loss] : weight loss [Atrial Fibrillation] : atrial fibrillation [Stable] : stable [Serum Electrolytes] : serum electrolytes [Thyroid Function Tests] : thyroid function tests [Holter Monitor] : a Holter monitor [Echocardiogram] : an echocardiogram [Patient] : the patient [de-identified] :  toprol 50 mg daily. [de-identified] : low CHADS score,will not anticoagulate [FreeTextEntry1] : Patient with above hx \par \par s/p ascending aorta , arch  reimplantation of coronaries , BLESSING occlusion with palpitation , recurrent atrial fibrillation ,  s/p cardioversion   now in sinus rhythm bradycardia ,  continue BB , contiue ecotrin \par \par HTN . controlled ,continue current medication \par \par Hyperlipidemia  continue statin \par \par asthma : controlled with  inhalers , \par \par \par follow up after 4 months  blood work prior to next visit \par

## 2019-09-04 LAB
BASOPHILS # BLD AUTO: 0.03 K/UL
BASOPHILS NFR BLD AUTO: 0.4 %
EOSINOPHIL # BLD AUTO: 0.18 K/UL
EOSINOPHIL NFR BLD AUTO: 2.5 %
HCT VFR BLD CALC: 35.4 %
HGB BLD-MCNC: 11.3 G/DL
IMM GRANULOCYTES NFR BLD AUTO: 0.4 %
LYMPHOCYTES # BLD AUTO: 1 K/UL
LYMPHOCYTES NFR BLD AUTO: 14 %
MAN DIFF?: NORMAL
MCHC RBC-ENTMCNC: 29.4 PG
MCHC RBC-ENTMCNC: 31.9 GM/DL
MCV RBC AUTO: 91.9 FL
MONOCYTES # BLD AUTO: 0.7 K/UL
MONOCYTES NFR BLD AUTO: 9.8 %
NEUTROPHILS # BLD AUTO: 5.19 K/UL
NEUTROPHILS NFR BLD AUTO: 72.9 %
PLATELET # BLD AUTO: 658 K/UL
RBC # BLD: 3.85 M/UL
RBC # FLD: 14.7 %
WBC # FLD AUTO: 7.13 K/UL

## 2019-09-05 ENCOUNTER — NON-APPOINTMENT (OUTPATIENT)
Age: 79
End: 2019-09-05

## 2019-09-05 ENCOUNTER — APPOINTMENT (OUTPATIENT)
Dept: CARDIOLOGY | Facility: CLINIC | Age: 79
End: 2019-09-05
Payer: MEDICARE

## 2019-09-05 VITALS — SYSTOLIC BLOOD PRESSURE: 134 MMHG | DIASTOLIC BLOOD PRESSURE: 70 MMHG

## 2019-09-05 VITALS
HEART RATE: 48 BPM | SYSTOLIC BLOOD PRESSURE: 147 MMHG | OXYGEN SATURATION: 97 % | HEIGHT: 68 IN | DIASTOLIC BLOOD PRESSURE: 73 MMHG | WEIGHT: 176 LBS | BODY MASS INDEX: 26.67 KG/M2

## 2019-09-05 PROCEDURE — 99214 OFFICE O/P EST MOD 30 MIN: CPT | Mod: 25

## 2019-09-05 PROCEDURE — 93000 ELECTROCARDIOGRAM COMPLETE: CPT

## 2019-09-05 NOTE — REVIEW OF SYSTEMS
[Fever] : no fever [Chills] : no chills [Eyeglasses] : not currently wearing eyeglasses [Earache] : no earache [Mouth Sores] : no mouth sores [Shortness Of Breath] : no shortness of breath [Palpitations] : no palpitations [Cough] : no cough [Abdominal Pain] : no abdominal pain [Heartburn] : no heartburn [Urinary Frequency] : no change in urinary frequency [Joint Pain] : no joint pain [Skin: A Rash] : no rash: [Skin Lesions] : no skin lesions [Confusion] : no confusion was observed [Easy Bleeding] : no tendency for easy bleeding [Negative] : Heme/Lymph

## 2019-09-05 NOTE — REASON FOR VISIT
[Follow-Up - Clinic] : a clinic follow-up of [Atrial Fibrillation] : atrial fibrillation [Hyperlipidemia] : hyperlipidemia [Hypertension] : hypertension [Medication Management] : Medication management [Palpitations] : palpitations [FreeTextEntry1] : Ascending aortic aneurysm repair

## 2019-09-05 NOTE — HISTORY OF PRESENT ILLNESS
[FreeTextEntry1] : 79 -year-old male with history of PAF HTN s/p GIORGIO cardioversion  twice in 2011 , 2015  , 2018, HTN, asthma    Patient  had surgery for AAA , aortic root, valve , arch replacement ,  closed BLESSING ,9/13/18 , with post op recurrence afib ,  Patient was cardioverted sinus rhythm , patient is doing well ,  off of anticoagulation , amiodarone. \par \par patient denies any symptoms suggestive of afib \par \par now he is feeling fine , denies any chest pain or shortness of breath or dizziness , \par \par His CT surgeon recommended  CT angio of chest  as patient AVR with root replacement ,ascending and arch \par \par No astham symptoms \par  \par \par \par \par \par \par \par annual MRI HAS BEEN STABLE

## 2019-09-05 NOTE — DISCUSSION/SUMMARY
[Paroxysmal Atrial Fibrillation] : paroxysmal atrial fibrillation [Rhythm Control] : rhythm control [Anticoagulation] : anticoagulation [Episodic Cardioversion] : episodic cardioversion [Continue] : continuing beta blockers [Hyperlipidemia] : hyperlipidemia [None] : none [Diet Modification] : diet modification [Exercise] : exercise [Weight Loss] : weight loss [Atrial Fibrillation] : atrial fibrillation [Stable] : stable [Serum Electrolytes] : serum electrolytes [Thyroid Function Tests] : thyroid function tests [Holter Monitor] : a Holter monitor [Echocardiogram] : an echocardiogram [Patient] : the patient [de-identified] :  toprol 50 mg daily. [de-identified] : low CHADS score,will not anticoagulate [FreeTextEntry1] : Patient with above hx \par \par s/p ascending aorta , arch  reimplantation of coronaries , BLESSING occlusion with palpitation , recurrent atrial fibrillation ,  s/p cardioversion   now in sinus rhythm bradycardia ,  continue BB , contiue ecotrin  will obtain follow up CT angio to asses  aorta , \par \par HTN . controlled ,continue current medication  low salt diet \par \par Hyperlipidemia  continue statin \par \par asthma : controlled  without inhaler \par \par \par follow up after 4 months  blood work prior to next visit \par

## 2019-09-06 LAB
25(OH)D3 SERPL-MCNC: 37.7 NG/ML
ALBUMIN SERPL ELPH-MCNC: 4.2 G/DL
ALP BLD-CCNC: 72 U/L
ALT SERPL-CCNC: 17 U/L
ANION GAP SERPL CALC-SCNC: 12 MMOL/L
AST SERPL-CCNC: 21 U/L
BASOPHILS # BLD AUTO: 0.04 K/UL
BASOPHILS NFR BLD AUTO: 1 %
BILIRUB SERPL-MCNC: 0.5 MG/DL
BUN SERPL-MCNC: 19 MG/DL
CALCIUM SERPL-MCNC: 9.3 MG/DL
CHLORIDE SERPL-SCNC: 101 MMOL/L
CHOLEST SERPL-MCNC: 162 MG/DL
CHOLEST/HDLC SERPL: 2.4 RATIO
CO2 SERPL-SCNC: 25 MMOL/L
CREAT SERPL-MCNC: 1.16 MG/DL
EOSINOPHIL # BLD AUTO: 0.09 K/UL
EOSINOPHIL NFR BLD AUTO: 2.3 %
ESTIMATED AVERAGE GLUCOSE: 108 MG/DL
GLUCOSE SERPL-MCNC: 105 MG/DL
HBA1C MFR BLD HPLC: 5.4 %
HCT VFR BLD CALC: 47.2 %
HDLC SERPL-MCNC: 69 MG/DL
HGB BLD-MCNC: 15.7 G/DL
IMM GRANULOCYTES NFR BLD AUTO: 0.3 %
LDLC SERPL CALC-MCNC: 81 MG/DL
LYMPHOCYTES # BLD AUTO: 1.14 K/UL
LYMPHOCYTES NFR BLD AUTO: 28.8 %
MAN DIFF?: NORMAL
MCHC RBC-ENTMCNC: 30.5 PG
MCHC RBC-ENTMCNC: 33.3 GM/DL
MCV RBC AUTO: 91.7 FL
MONOCYTES # BLD AUTO: 0.34 K/UL
MONOCYTES NFR BLD AUTO: 8.6 %
NEUTROPHILS # BLD AUTO: 2.34 K/UL
NEUTROPHILS NFR BLD AUTO: 59 %
PLATELET # BLD AUTO: 224 K/UL
POTASSIUM SERPL-SCNC: 5.3 MMOL/L
PROT SERPL-MCNC: 7.1 G/DL
PSA FREE FLD-MCNC: 33 %
PSA FREE SERPL-MCNC: 0.43 NG/ML
PSA SERPL-MCNC: 1.3 NG/ML
RBC # BLD: 5.15 M/UL
RBC # FLD: 12.9 %
SODIUM SERPL-SCNC: 138 MMOL/L
TRIGL SERPL-MCNC: 59 MG/DL
TSH SERPL-ACNC: 2.25 UIU/ML
WBC # FLD AUTO: 3.96 K/UL

## 2020-01-09 ENCOUNTER — APPOINTMENT (OUTPATIENT)
Dept: CARDIOLOGY | Facility: CLINIC | Age: 80
End: 2020-01-09
Payer: MEDICARE

## 2020-01-09 ENCOUNTER — LABORATORY RESULT (OUTPATIENT)
Age: 80
End: 2020-01-09

## 2020-01-09 ENCOUNTER — NON-APPOINTMENT (OUTPATIENT)
Age: 80
End: 2020-01-09

## 2020-01-09 VITALS — HEIGHT: 68 IN | WEIGHT: 172 LBS | BODY MASS INDEX: 26.07 KG/M2

## 2020-01-09 VITALS — OXYGEN SATURATION: 94 % | SYSTOLIC BLOOD PRESSURE: 115 MMHG | DIASTOLIC BLOOD PRESSURE: 65 MMHG | HEART RATE: 60 BPM

## 2020-01-09 DIAGNOSIS — Z00.00 ENCOUNTER FOR GENERAL ADULT MEDICAL EXAMINATION W/OUT ABNORMAL FINDINGS: ICD-10-CM

## 2020-01-09 PROCEDURE — 93000 ELECTROCARDIOGRAM COMPLETE: CPT

## 2020-01-09 PROCEDURE — 99214 OFFICE O/P EST MOD 30 MIN: CPT

## 2020-01-09 NOTE — DISCUSSION/SUMMARY
[Paroxysmal Atrial Fibrillation] : paroxysmal atrial fibrillation [Rhythm Control] : rhythm control [Continue] : continuing beta blockers [Episodic Cardioversion] : episodic cardioversion [Anticoagulation] : anticoagulation [Hyperlipidemia] : hyperlipidemia [Diet Modification] : diet modification [Exercise] : exercise [None] : none [Weight Loss] : weight loss [Atrial Fibrillation] : atrial fibrillation [Stable] : stable [Thyroid Function Tests] : thyroid function tests [Serum Electrolytes] : serum electrolytes [Patient] : the patient [Echocardiogram] : an echocardiogram [Holter Monitor] : a Holter monitor [FreeTextEntry1] : Patient with above hx \par \par s/p ascending aorta , arch  reimplantation of coronaries , BLESSING occlusion with palpitation , recurrent atrial fibrillation ,  s/p cardioversion   now in sinus rhythm bradycardia ,  continue BB , continue ecotrin  will obtain follow up CT angio to asses  aorta , \par \par HTN . controlled ,continue current medication  low salt diet \par \par Hyperlipidemia  continue statin \par \par asthma : controlled  without inhaler \par \par follow up after 4 months  blood work prior to next visit \par  [de-identified] :  toprol 50 mg daily. [de-identified] : low CHADS score,will not anticoagulate

## 2020-01-09 NOTE — REVIEW OF SYSTEMS
[Chills] : no chills [Fever] : no fever [Eyeglasses] : not currently wearing eyeglasses [Earache] : no earache [Mouth Sores] : no mouth sores [Cough] : no cough [Palpitations] : no palpitations [Shortness Of Breath] : no shortness of breath [Abdominal Pain] : no abdominal pain [Urinary Frequency] : no change in urinary frequency [Heartburn] : no heartburn [Joint Pain] : no joint pain [Skin: A Rash] : no rash: [Skin Lesions] : no skin lesions [Confusion] : no confusion was observed [Easy Bleeding] : no tendency for easy bleeding [Negative] : Heme/Lymph

## 2020-01-09 NOTE — REASON FOR VISIT
[Hyperlipidemia] : hyperlipidemia [Atrial Fibrillation] : atrial fibrillation [Follow-Up - Clinic] : a clinic follow-up of [Medication Management] : Medication management [Hypertension] : hypertension [FreeTextEntry1] : Ascending aortic aneurysm repair  [Palpitations] : palpitations

## 2020-01-09 NOTE — HISTORY OF PRESENT ILLNESS
[FreeTextEntry1] : 79 -year-old male with history of PAF HTN s/p GIORGIO cardioversion  twice in 2011 , 2015  , 2018, HTN, asthma    Patient  had surgery for AAA , aortic root, valve , arch replacement ,  closed BLESSING ,9/13/18 , with post op recurrence afib ,  Patient was cardioverted sinus rhythm , patient is doing well ,  off of anticoagulation , amiodarone. \par \par patient denies any symptoms suggestive of afib  remain sinus rhythm \par \par now he is feeling fine , denies any chest pain or shortness of breath or dizziness , \par \par His CT surgeon recommended  CT angio of chest  as patient AVR with root replacement ,ascending and arch \par \par No asthma symptoms \par  \par \par \par \par \par \par \par annual MRI HAS BEEN STABLE

## 2020-01-09 NOTE — PHYSICAL EXAM
[General Appearance - Well Developed] : well developed [Well Groomed] : well groomed [General Appearance - In No Acute Distress] : no acute distress [General Appearance - Well Nourished] : well nourished [No Deformities] : no deformities [Normal Conjunctiva] : the conjunctiva exhibited no abnormalities [Normal Oral Mucosa] : normal oral mucosa [Respiration, Rhythm And Depth] : normal respiratory rhythm and effort [] : no respiratory distress [Chest Palpation] : palpation of the chest revealed no abnormalities [Auscultation Breath Sounds / Voice Sounds] : lungs were clear to auscultation bilaterally [Exaggerated Use Of Accessory Muscles For Inspiration] : no accessory muscle use [Lungs Percussion] : the lungs were normal to percussion [Heart Sounds] : normal S1 and S2 [Heart Rate And Rhythm] : heart rate and rhythm were normal [Arterial Pulses Normal] : the arterial pulses were normal [Veins - Varicosity Changes] : no varicosital changes were noted in the lower extremities [Edema] : no peripheral edema present [Irregularly Irregular] : the rhythm was irregularly irregular [Systolic grade ___/6] : A grade [unfilled]/6 systolic murmur was heard. [Bowel Sounds] : normal bowel sounds [Abdomen Tenderness] : non-tender [Abdomen Soft] : soft [Gait - Sufficient For Exercise Testing] : the gait was sufficient for exercise testing [Abdomen Mass (___ Cm)] : no abdominal mass palpated [Abnormal Walk] : normal gait [Skin Color & Pigmentation] : normal skin color and pigmentation [Nail Clubbing] : no clubbing of the fingernails [FreeTextEntry1] : No LE Edema  [Normal Appearance] : was normal in appearance [Oriented To Time, Place, And Person] : oriented to person, place, and time [Normal] : the thyroid was normal [Neck Supple] : was supple

## 2020-01-13 LAB
25(OH)D3 SERPL-MCNC: 34 NG/ML
ALBUMIN SERPL ELPH-MCNC: 4 G/DL
ALP BLD-CCNC: 65 U/L
ALT SERPL-CCNC: 16 U/L
ANION GAP SERPL CALC-SCNC: 14 MMOL/L
APPEARANCE: CLEAR
AST SERPL-CCNC: 19 U/L
BASOPHILS # BLD AUTO: 0.02 K/UL
BASOPHILS NFR BLD AUTO: 0.4 %
BILIRUB SERPL-MCNC: 0.8 MG/DL
BILIRUBIN URINE: NEGATIVE
BLOOD URINE: NEGATIVE
BUN SERPL-MCNC: 23 MG/DL
CALCIUM SERPL-MCNC: 8.8 MG/DL
CHLORIDE SERPL-SCNC: 102 MMOL/L
CHOLEST SERPL-MCNC: 160 MG/DL
CHOLEST/HDLC SERPL: 2.5 RATIO
CO2 SERPL-SCNC: 23 MMOL/L
COLOR: YELLOW
CREAT SERPL-MCNC: 1.14 MG/DL
EOSINOPHIL # BLD AUTO: 0.01 K/UL
EOSINOPHIL NFR BLD AUTO: 0.2 %
ESTIMATED AVERAGE GLUCOSE: 111 MG/DL
GLUCOSE QUALITATIVE U: NEGATIVE
GLUCOSE SERPL-MCNC: 104 MG/DL
HBA1C MFR BLD HPLC: 5.5 %
HCT VFR BLD CALC: 46 %
HDLC SERPL-MCNC: 65 MG/DL
HGB BLD-MCNC: 15.2 G/DL
IMM GRANULOCYTES NFR BLD AUTO: 0.2 %
KETONES URINE: NEGATIVE
LDLC SERPL CALC-MCNC: 81 MG/DL
LEUKOCYTE ESTERASE URINE: NEGATIVE
LYMPHOCYTES # BLD AUTO: 0.48 K/UL
LYMPHOCYTES NFR BLD AUTO: 9.3 %
MAN DIFF?: NORMAL
MCHC RBC-ENTMCNC: 30.2 PG
MCHC RBC-ENTMCNC: 33 GM/DL
MCV RBC AUTO: 91.5 FL
MONOCYTES # BLD AUTO: 0.37 K/UL
MONOCYTES NFR BLD AUTO: 7.1 %
NEUTROPHILS # BLD AUTO: 4.29 K/UL
NEUTROPHILS NFR BLD AUTO: 82.8 %
NITRITE URINE: NEGATIVE
PH URINE: 6
PLATELET # BLD AUTO: 199 K/UL
POTASSIUM SERPL-SCNC: 4.3 MMOL/L
PROT SERPL-MCNC: 6.6 G/DL
PROTEIN URINE: ABNORMAL
PSA FREE FLD-MCNC: 30 %
PSA FREE SERPL-MCNC: 0.36 NG/ML
PSA SERPL-MCNC: 1.2 NG/ML
RBC # BLD: 5.03 M/UL
RBC # FLD: 13.4 %
SODIUM SERPL-SCNC: 139 MMOL/L
SPECIFIC GRAVITY URINE: 1.03
TRIGL SERPL-MCNC: 71 MG/DL
TSH SERPL-ACNC: 2.33 UIU/ML
UROBILINOGEN URINE: NORMAL
WBC # FLD AUTO: 5.18 K/UL

## 2020-01-17 ENCOUNTER — APPOINTMENT (OUTPATIENT)
Dept: CARDIOLOGY | Facility: CLINIC | Age: 80
End: 2020-01-17
Payer: MEDICARE

## 2020-01-17 PROCEDURE — 93306 TTE W/DOPPLER COMPLETE: CPT

## 2020-04-23 NOTE — PATIENT PROFILE ADULT. - LIVING ARRANGEMENTS, TEMPORARY FAMILY, PROFILE
Routing refill request to provider for review/approval because:    Failed protocol for the following reasons; both medications   Medication is active on med list Protocol Details    Normal serum creatinine on file in past 12 months     Normal serum potassium on file in past 12 months     Normal serum sodium on file in past 12 months        Natali Robledo RN, [facility] Triage           none required

## 2020-05-14 ENCOUNTER — APPOINTMENT (OUTPATIENT)
Dept: CARDIOLOGY | Facility: CLINIC | Age: 80
End: 2020-05-14

## 2020-11-11 ENCOUNTER — TRANSCRIPTION ENCOUNTER (OUTPATIENT)
Age: 80
End: 2020-11-11

## 2020-11-12 ENCOUNTER — TRANSCRIPTION ENCOUNTER (OUTPATIENT)
Age: 80
End: 2020-11-12

## 2020-11-20 ENCOUNTER — NON-APPOINTMENT (OUTPATIENT)
Age: 80
End: 2020-11-20

## 2020-11-20 ENCOUNTER — APPOINTMENT (OUTPATIENT)
Dept: CARDIOLOGY | Facility: CLINIC | Age: 80
End: 2020-11-20
Payer: MEDICARE

## 2020-11-20 VITALS
HEART RATE: 52 BPM | SYSTOLIC BLOOD PRESSURE: 101 MMHG | BODY MASS INDEX: 25.46 KG/M2 | DIASTOLIC BLOOD PRESSURE: 59 MMHG | WEIGHT: 168 LBS | OXYGEN SATURATION: 97 % | HEIGHT: 68 IN

## 2020-11-20 DIAGNOSIS — J45.909 UNSPECIFIED ASTHMA, UNCOMPLICATED: ICD-10-CM

## 2020-11-20 PROCEDURE — 93000 ELECTROCARDIOGRAM COMPLETE: CPT

## 2020-11-20 PROCEDURE — 99214 OFFICE O/P EST MOD 30 MIN: CPT

## 2020-11-20 NOTE — DISCUSSION/SUMMARY
[Paroxysmal Atrial Fibrillation] : paroxysmal atrial fibrillation [Rhythm Control] : rhythm control [Anticoagulation] : anticoagulation [Episodic Cardioversion] : episodic cardioversion [Continue] : continuing beta blockers [Hyperlipidemia] : hyperlipidemia [None] : none [Diet Modification] : diet modification [Exercise] : exercise [Weight Loss] : weight loss [Atrial Fibrillation] : atrial fibrillation [Stable] : stable [Serum Electrolytes] : serum electrolytes [Thyroid Function Tests] : thyroid function tests [Holter Monitor] : a Holter monitor [Echocardiogram] : an echocardiogram [Patient] : the patient [de-identified] :  toprol 50 mg daily. [de-identified] : low CHADS score,will not anticoagulate [FreeTextEntry1] : Patient with above hx \par \par s/p ascending aorta , arch  reimplantation of coronaries , BLESSING occlusion with palpitation , recurrent atrial fibrillation ,  s/p cardioversion   now in sinus rhythm bradycardia ,  continue BB , continue ecotrin   stable ON CT \par \par HTN . controlled ,continue current medication  low salt diet \par \par Hyperlipidemia  continue statin \par \par asthma : bullous lung disease stable ,  controlled  without inhaler \par \par follow up after 4 months  blood work  , will obtain echo carotid prior to next visit \par

## 2020-11-20 NOTE — HISTORY OF PRESENT ILLNESS
[FreeTextEntry1] : 79 -year-old male with history of PAF HTN s/p GIORGIO cardioversion  twice in 2011 , 2015  , 2018, HTN, asthma    Patient  had surgery for AAA , aortic root, valve , arch replacement ,  closed BLESSING ,9/13/18 , with post op recurrence afib ,  Patient was cardioverted sinus rhythm , patient is doing well ,  off of anticoagulation , amiodarone. \par \par patient denies any symptoms suggestive of afib  remain sinus rhythm \par \par denies any chest pain or shortness of breath or dizziness , \par \par His CT surgeon recommended  CT angio of chest  as patient AVR with root replacement ,ascending and arch \par \par No asthma symptoms \par  \par \par \par \par \par \par \par annual MRI HAS BEEN STABLE

## 2020-12-03 LAB
25(OH)D3 SERPL-MCNC: 48.3 NG/ML
ALBUMIN SERPL ELPH-MCNC: 4 G/DL
ALP BLD-CCNC: 67 U/L
ALT SERPL-CCNC: 24 U/L
ANION GAP SERPL CALC-SCNC: 10 MMOL/L
APPEARANCE: CLEAR
AST SERPL-CCNC: 32 U/L
BASOPHILS # BLD AUTO: 0.03 K/UL
BASOPHILS NFR BLD AUTO: 0.7 %
BILIRUB SERPL-MCNC: 0.8 MG/DL
BILIRUBIN URINE: NEGATIVE
BLOOD URINE: NEGATIVE
BUN SERPL-MCNC: 19 MG/DL
CALCIUM SERPL-MCNC: 9 MG/DL
CHLORIDE SERPL-SCNC: 102 MMOL/L
CHOLEST SERPL-MCNC: 162 MG/DL
CO2 SERPL-SCNC: 26 MMOL/L
COLOR: YELLOW
CREAT SERPL-MCNC: 1.05 MG/DL
EOSINOPHIL # BLD AUTO: 0.08 K/UL
EOSINOPHIL NFR BLD AUTO: 1.9 %
ESTIMATED AVERAGE GLUCOSE: 114 MG/DL
GLUCOSE QUALITATIVE U: NEGATIVE
GLUCOSE SERPL-MCNC: 102 MG/DL
HBA1C MFR BLD HPLC: 5.6 %
HCT VFR BLD CALC: 44.5 %
HDLC SERPL-MCNC: 64 MG/DL
HGB BLD-MCNC: 14.4 G/DL
IMM GRANULOCYTES NFR BLD AUTO: 0.2 %
KETONES URINE: NEGATIVE
LDLC SERPL CALC-MCNC: 87 MG/DL
LEUKOCYTE ESTERASE URINE: NEGATIVE
LYMPHOCYTES # BLD AUTO: 1.18 K/UL
LYMPHOCYTES NFR BLD AUTO: 28.2 %
MAN DIFF?: NORMAL
MCHC RBC-ENTMCNC: 30.4 PG
MCHC RBC-ENTMCNC: 32.4 GM/DL
MCV RBC AUTO: 93.9 FL
MONOCYTES # BLD AUTO: 0.37 K/UL
MONOCYTES NFR BLD AUTO: 8.9 %
NEUTROPHILS # BLD AUTO: 2.51 K/UL
NEUTROPHILS NFR BLD AUTO: 60.1 %
NITRITE URINE: NEGATIVE
NONHDLC SERPL-MCNC: 98 MG/DL
PH URINE: 6
PLATELET # BLD AUTO: 221 K/UL
POTASSIUM SERPL-SCNC: 4.8 MMOL/L
PROT SERPL-MCNC: 7 G/DL
PROTEIN URINE: NORMAL
PSA FREE FLD-MCNC: 32 %
PSA FREE SERPL-MCNC: 0.39 NG/ML
PSA SERPL-MCNC: 1.22 NG/ML
RBC # BLD: 4.74 M/UL
RBC # FLD: 13.2 %
SODIUM SERPL-SCNC: 138 MMOL/L
SPECIFIC GRAVITY URINE: 1.02
TRIGL SERPL-MCNC: 55 MG/DL
TSH SERPL-ACNC: 2.03 UIU/ML
UROBILINOGEN URINE: NORMAL
WBC # FLD AUTO: 4.18 K/UL

## 2020-12-16 PROBLEM — Z87.440 HISTORY OF URINARY TRACT INFECTION: Status: RESOLVED | Noted: 2018-05-08 | Resolved: 2020-12-16

## 2020-12-29 NOTE — ASU DISCHARGE PLAN (ADULT/PEDIATRIC). - YOU MAY EXPERIENCE SOME ITCHING OR SCRATCHINESS AFTER SURGERY.  THIS IS NORMAL
8/1/13: ASCUS, age 23.  Plan pap in 1 yr.  10/2014: ASCUS, age 24.  Plan pap in 1 yr. Tracking started.  12/4/15: ASC[facility], age 25, plan: [facility]  1/2016 [facility] impression: Normal cervix, cotest one year  12/23/16 NIL pap. Plan: pap in 1 year  12/07/17: [facility] pap, + HR HPV (not 16 or 18) result. Plan [facility].  01/26/18: [facility] Bx's LUKE I and LUKE 2. Plan pap/[facility] in 4-6 months.  06/07/18: [facility] LSIL LUKE I, ECC small amount of atypia favor LSIL. Ascus pap, + HR HPV (not 16 or 18) result.Plan [facility] and pap in 6 months per provider.   12/21/18: LSIL pap, + HR HPV (not 16 or 18) result done along with a [facility] that had benign biopsies. Plan cotest in 1 year.   12/27/19 ASCUS pap, + HR HPV, not 16/18. Plan [facility]  01/31/20: [facility] Bx LSIL LUKE 1. Plan cotest in 1 year. Pt was advised by provider.   12/18/20 NIL pap, +HR HPV (not 16/18). Plan: colp due before 3/18/21   Statement Selected

## 2021-01-14 ENCOUNTER — APPOINTMENT (OUTPATIENT)
Dept: CARDIOLOGY | Facility: CLINIC | Age: 81
End: 2021-01-14
Payer: MEDICARE

## 2021-01-14 PROCEDURE — 93880 EXTRACRANIAL BILAT STUDY: CPT

## 2021-01-14 PROCEDURE — 93306 TTE W/DOPPLER COMPLETE: CPT

## 2021-01-22 LAB
SARS-COV-2 IGG SERPL IA-ACNC: 0.07 INDEX
SARS-COV-2 IGG SERPL QL IA: NEGATIVE

## 2021-02-04 NOTE — PATIENT PROFILE ADULT. - SPIRITUAL CULTURAL, RELIGIOUS PRACTICES/VALUES, PROFILE
02/04/21                            Ariel Gabriel  [address]  [place] [id number]    To Whom It May Concern:    This is to certify Ariel Gabriel may return to work on 2/5/2021.   RESTRICTIONS: None              DAVID Polanco [facility]-[address] [place] [id number]  Phone: [phone]     none

## 2021-03-18 ENCOUNTER — APPOINTMENT (OUTPATIENT)
Dept: CARDIOLOGY | Facility: CLINIC | Age: 81
End: 2021-03-18
Payer: MEDICARE

## 2021-03-18 ENCOUNTER — NON-APPOINTMENT (OUTPATIENT)
Age: 81
End: 2021-03-18

## 2021-03-18 VITALS — SYSTOLIC BLOOD PRESSURE: 118 MMHG | DIASTOLIC BLOOD PRESSURE: 70 MMHG

## 2021-03-18 VITALS
BODY MASS INDEX: 25.76 KG/M2 | SYSTOLIC BLOOD PRESSURE: 136 MMHG | OXYGEN SATURATION: 99 % | DIASTOLIC BLOOD PRESSURE: 80 MMHG | HEART RATE: 48 BPM | HEIGHT: 68 IN | WEIGHT: 170 LBS

## 2021-03-18 PROCEDURE — 99214 OFFICE O/P EST MOD 30 MIN: CPT

## 2021-03-18 PROCEDURE — 93000 ELECTROCARDIOGRAM COMPLETE: CPT

## 2021-03-18 NOTE — DISCUSSION/SUMMARY
[Paroxysmal Atrial Fibrillation] : paroxysmal atrial fibrillation [Rhythm Control] : rhythm control [Anticoagulation] : anticoagulation [Episodic Cardioversion] : episodic cardioversion [Continue] : continuing beta blockers [Hyperlipidemia] : hyperlipidemia [None] : none [Diet Modification] : diet modification [Exercise] : exercise [Weight Loss] : weight loss [Atrial Fibrillation] : atrial fibrillation [Stable] : stable [Serum Electrolytes] : serum electrolytes [Thyroid Function Tests] : thyroid function tests [Holter Monitor] : a Holter monitor [Echocardiogram] : an echocardiogram [Patient] : the patient [de-identified] :  toprol 50 mg daily. [de-identified] : low CHADS score,will not anticoagulate [FreeTextEntry1] : Patient with above hx \par \par s/p ascending aorta , arch  reimplantation of coronaries , BLESSING occlusion with palpitation , recurrent atrial fibrillation ,  s/p cardioversion   now in sinus rhythm bradycardia ,  continue BB , continue ecotrin   stable ON CT \par \par HTN .hypertensive heart disease  controlled ,continue current medication  low salt diet \par \par Hyperlipidemia  continue statin \par \par asthma : bullous lung disease stable ,  controlled  without inhaler \par \par follow up after 4 months   covid antibody spike protein test\par

## 2021-03-18 NOTE — HISTORY OF PRESENT ILLNESS
[FreeTextEntry1] : patient with history of PAF HTN s/p GIORGIO cardioversion  twice in 2011 , 2015  , 2018, HTN, asthma    Patient  had surgery for AAA , aortic root, valve , arch replacement ,  closed BLESSING ,9/13/18 , with post op recurrence afib ,  Patient was cardioverted sinus rhythm , patient is doing well ,  off of anticoagulation , amiodarone. \par \par patient denies any symptoms suggestive of afib  remain sinus rhythm \par \par denies any chest pain or shortness of breath or dizziness , \par \par His CT surgeon recommended  CT angio of chest  as patient AVR with root replacement ,ascending and arch \par \par No asthma symptoms \par  \par \par \par \par \par \par \par annual MRI HAS BEEN STABLE

## 2021-03-19 LAB
ALBUMIN SERPL ELPH-MCNC: 4.3 G/DL
ALP BLD-CCNC: 81 U/L
ALT SERPL-CCNC: 23 U/L
ANION GAP SERPL CALC-SCNC: 12 MMOL/L
AST SERPL-CCNC: 26 U/L
BILIRUB SERPL-MCNC: 0.5 MG/DL
BUN SERPL-MCNC: 20 MG/DL
CALCIUM SERPL-MCNC: 9.2 MG/DL
CHLORIDE SERPL-SCNC: 103 MMOL/L
CHOLEST SERPL-MCNC: 166 MG/DL
CO2 SERPL-SCNC: 24 MMOL/L
COVID-19 SPIKE DOMAIN ANTIBODY INTERPRETATION: POSITIVE
CREAT SERPL-MCNC: 0.99 MG/DL
GLUCOSE SERPL-MCNC: 102 MG/DL
HDLC SERPL-MCNC: 64 MG/DL
LDLC SERPL CALC-MCNC: 79 MG/DL
NONHDLC SERPL-MCNC: 103 MG/DL
POTASSIUM SERPL-SCNC: 5.1 MMOL/L
PROT SERPL-MCNC: 6.6 G/DL
SARS-COV-2 AB SERPL IA-ACNC: >250 U/ML
SODIUM SERPL-SCNC: 139 MMOL/L
TRIGL SERPL-MCNC: 118 MG/DL

## 2021-06-17 ENCOUNTER — TRANSCRIPTION ENCOUNTER (OUTPATIENT)
Age: 81
End: 2021-06-17

## 2021-07-16 ENCOUNTER — APPOINTMENT (OUTPATIENT)
Dept: CARDIOLOGY | Facility: CLINIC | Age: 81
End: 2021-07-16
Payer: MEDICARE

## 2021-07-16 ENCOUNTER — NON-APPOINTMENT (OUTPATIENT)
Age: 81
End: 2021-07-16

## 2021-07-16 VITALS
WEIGHT: 170 LBS | OXYGEN SATURATION: 98 % | BODY MASS INDEX: 25.76 KG/M2 | SYSTOLIC BLOOD PRESSURE: 115 MMHG | HEIGHT: 68 IN | HEART RATE: 49 BPM | DIASTOLIC BLOOD PRESSURE: 63 MMHG

## 2021-07-16 PROCEDURE — 93000 ELECTROCARDIOGRAM COMPLETE: CPT

## 2021-07-16 PROCEDURE — 99214 OFFICE O/P EST MOD 30 MIN: CPT

## 2021-07-16 NOTE — HISTORY OF PRESENT ILLNESS
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

## 2021-07-16 NOTE — PHYSICAL EXAM

## 2021-07-16 NOTE — REASON FOR VISIT
[Arrhythmia/ECG Abnorrmalities] : arrhythmia/ECG abnormalities [Structural Heart and Valve Disease] : structural heart and valve disease [Hyperlipidemia] : hyperlipidemia [Hypertension] : hypertension [Coronary Artery Disease] : coronary artery disease [Symptom and Test Evaluation] : symptom and test evaluation

## 2021-07-16 NOTE — CARDIOLOGY SUMMARY
[de-identified] :  sinus bradycardia possible inferior infarct low QRS voltage  [de-identified] : 1/14/21 Mild LVH EF 60% Normal Bio AVR hx of ascending ,arch of aorta repair  [de-identified] : 9/13/18 Non obstructive disease   [de-identified] : 9/13/18  aortic arch , aortic root , valve replaced , closed BLESSING  [de-identified] : jan 2021 Minimal carotid atherosclerosis

## 2021-07-16 NOTE — DISCUSSION/SUMMARY
[Paroxysmal Atrial Fibrillation] : paroxysmal atrial fibrillation [Rhythm Control] : rhythm control [Anticoagulation] : anticoagulation [Episodic Cardioversion] : episodic cardioversion [Hyperlipidemia] : hyperlipidemia [Diet Modification] : diet modification [Exercise] : exercise [Weight Loss] : weight loss [Atrial Fibrillation] : atrial fibrillation [Stable] : stable [Serum Electrolytes] : serum electrolytes [Thyroid Function Tests] : thyroid function tests [Holter Monitor] : a Holter monitor [Echocardiogram] : an echocardiogram [Patient] : the patient [FreeTextEntry1] : Patient with above hx \par \par s/p ascending aorta , arch  reimplantation of coronaries , BLESSING occlusion with palpitation , recurrent atrial fibrillation ,  s/p cardioversion   now in sinus rhythm bradycardia ,  continue BB , continue ecotrin   stable ON CT \par \par Palpitations : 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

## 2021-08-23 LAB
ALBUMIN SERPL ELPH-MCNC: 4.2 G/DL
ALP BLD-CCNC: 73 U/L
ALT SERPL-CCNC: 20 U/L
ANION GAP SERPL CALC-SCNC: 12 MMOL/L
AST SERPL-CCNC: 20 U/L
BILIRUB SERPL-MCNC: 0.9 MG/DL
BUN SERPL-MCNC: 19 MG/DL
CALCIUM SERPL-MCNC: 8.9 MG/DL
CHLORIDE SERPL-SCNC: 102 MMOL/L
CHOLEST SERPL-MCNC: 154 MG/DL
CO2 SERPL-SCNC: 24 MMOL/L
COVID-19 SPIKE DOMAIN ANTIBODY INTERPRETATION: POSITIVE
CREAT SERPL-MCNC: 1.12 MG/DL
GLUCOSE SERPL-MCNC: 85 MG/DL
HDLC SERPL-MCNC: 63 MG/DL
LDLC SERPL CALC-MCNC: 79 MG/DL
NONHDLC SERPL-MCNC: 91 MG/DL
POTASSIUM SERPL-SCNC: 5.2 MMOL/L
PROT SERPL-MCNC: 6.7 G/DL
SARS-COV-2 AB SERPL IA-ACNC: 190 U/ML
SODIUM SERPL-SCNC: 139 MMOL/L
TRIGL SERPL-MCNC: 60 MG/DL

## 2021-11-11 NOTE — REASON FOR VISIT
[Symptom and Test Evaluation] : symptom and test evaluation [Arrhythmia/ECG Abnorrmalities] : arrhythmia/ECG abnormalities [Structural Heart and Valve Disease] : structural heart and valve disease [Hyperlipidemia] : hyperlipidemia [Hypertension] : hypertension [Coronary Artery Disease] : coronary artery disease

## 2021-11-12 ENCOUNTER — APPOINTMENT (OUTPATIENT)
Dept: CARDIOLOGY | Facility: CLINIC | Age: 81
End: 2021-11-12
Payer: MEDICARE

## 2021-11-12 ENCOUNTER — NON-APPOINTMENT (OUTPATIENT)
Age: 81
End: 2021-11-12

## 2021-11-12 VITALS
BODY MASS INDEX: 25.76 KG/M2 | HEART RATE: 53 BPM | OXYGEN SATURATION: 98 % | SYSTOLIC BLOOD PRESSURE: 112 MMHG | WEIGHT: 170 LBS | HEIGHT: 68 IN | DIASTOLIC BLOOD PRESSURE: 64 MMHG

## 2021-11-12 VITALS
TEMPERATURE: 98 F | HEIGHT: 68 IN | DIASTOLIC BLOOD PRESSURE: 84 MMHG | OXYGEN SATURATION: 98 % | HEART RATE: 53 BPM | BODY MASS INDEX: 25.76 KG/M2 | SYSTOLIC BLOOD PRESSURE: 112 MMHG | WEIGHT: 170 LBS | RESPIRATION RATE: 14 BRPM

## 2021-11-12 DIAGNOSIS — I71.2 THORACIC AORTIC ANEURYSM, W/OUT RUPTURE: ICD-10-CM

## 2021-11-12 PROCEDURE — 93000 ELECTROCARDIOGRAM COMPLETE: CPT

## 2021-11-12 PROCEDURE — 99214 OFFICE O/P EST MOD 30 MIN: CPT

## 2021-11-12 NOTE — REVIEW OF SYSTEMS
[Negative] : Heme/Lymph [SOB] : no shortness of breath [Palpitations] : palpitations [Cough] : no cough

## 2021-11-12 NOTE — HISTORY OF PRESENT ILLNESS
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

## 2021-11-12 NOTE — CARDIOLOGY SUMMARY
[de-identified] : 11/12/21  sinus bradycardia possible inferior infarct low QRS voltage  [de-identified] : 1/14/21 Mild LVH EF 60% Normal Bio AVR hx of ascending ,arch of aorta repair  [de-identified] : 9/13/18 Non obstructive disease   [de-identified] : 9/13/18  aortic arch , aortic root , valve replaced , closed BLESSING  [de-identified] : jan 2021 Minimal carotid atherosclerosis

## 2021-11-12 NOTE — PHYSICAL EXAM

## 2021-11-12 NOTE — DISCUSSION/SUMMARY
[Paroxysmal Atrial Fibrillation] : paroxysmal atrial fibrillation [Rhythm Control] : rhythm control [Anticoagulation] : anticoagulation [Episodic Cardioversion] : episodic cardioversion [Hyperlipidemia] : hyperlipidemia [Diet Modification] : diet modification [Exercise] : exercise [Weight Loss] : weight loss [Atrial Fibrillation] : atrial fibrillation [Stable] : stable [Serum Electrolytes] : serum electrolytes [Thyroid Function Tests] : thyroid function tests [Holter Monitor] : a Holter monitor [Echocardiogram] : an echocardiogram [Patient] : the patient [FreeTextEntry1] : Patient with above hx \par \par s/p ascending aorta , arch  reimplantation of coronaries , BLESSING occlusion with palpitation , recurrent atrial fibrillation ,  s/p cardioversion   now in sinus rhythm bradycardia ,  continue BB , continue ecotrin   stable ON CT \par \par Palpitations : appears to be due to symptomatic PVCS  bradycardia  , will continue  toprol to 75 mg po daily  monitor \par \par HTN .hypertensive heart disease  controlled ,continue current medication  low salt diet \par \par Hyperlipidemia  continue statin \par \par asthma : bullous lung disease stable ,  controlled  without inhaler \par \par follow up after 4 months  \par

## 2021-11-23 NOTE — ED ADULT NURSE NOTE - PAIN: BODY LOCATION
Physical Therapy Daily Progress Note      Patient: Luz Elena Toledo   : 2002  Diagnosis/ICD-10 Code:  Acute pain of right knee [M25.561]  Referring practitioner: Lazaro Lange I*  Date of Initial Visit: Type: THERAPY  Noted: 2021  Today's Date: 2021  Patient seen for 3 sessions           Subjective     Luz Elena Toledo reports:Continued improvement of her knee and no episodes of knee feeling like it is giving out.      Objective   See Exercise, Manual, and Modality Logs for complete treatment. Progressed / advanced exercises as noted in flow sheets;     Advanced  closed kinetic chain exercises today.    Assessment/Plan   Continues with good tolerance to interventions.  Continues to demonstrate small degree of quad lag with SLR.        Progress strengthening /stabilization /functional activity as tolerated.           Manual Therapy:         mins  04088;  Therapeutic Exercise:    35     mins  93431;     Neuromuscular Anahy:        mins  10565;    Therapeutic Activity:     10     mins  93748;     Gait Training:           mins  06919;     Ultrasound:          mins  88030;    Electrical Stimulation:         mins  44605 ( );  Dry Needling          mins self-pay    Timed Treatment:   45   mins   Total Treatment:     45   mins    Wenceslao Sandy PT  Physical Therapist                    
SOB

## 2022-02-10 ENCOUNTER — APPOINTMENT (OUTPATIENT)
Dept: CARDIOLOGY | Facility: CLINIC | Age: 82
End: 2022-02-10
Payer: MEDICARE

## 2022-02-10 PROCEDURE — 93306 TTE W/DOPPLER COMPLETE: CPT

## 2022-03-24 ENCOUNTER — APPOINTMENT (OUTPATIENT)
Dept: CARDIOLOGY | Facility: CLINIC | Age: 82
End: 2022-03-24
Payer: MEDICARE

## 2022-03-24 ENCOUNTER — NON-APPOINTMENT (OUTPATIENT)
Age: 82
End: 2022-03-24

## 2022-03-24 VITALS
HEIGHT: 68 IN | OXYGEN SATURATION: 96 % | WEIGHT: 166 LBS | DIASTOLIC BLOOD PRESSURE: 70 MMHG | HEART RATE: 52 BPM | SYSTOLIC BLOOD PRESSURE: 128 MMHG | BODY MASS INDEX: 25.16 KG/M2

## 2022-03-24 PROCEDURE — 99214 OFFICE O/P EST MOD 30 MIN: CPT

## 2022-03-24 PROCEDURE — 93000 ELECTROCARDIOGRAM COMPLETE: CPT

## 2022-03-24 NOTE — REVIEW OF SYSTEMS
[Palpitations] : palpitations [Negative] : Heme/Lymph [SOB] : no shortness of breath [Cough] : no cough

## 2022-03-24 NOTE — HISTORY OF PRESENT ILLNESS
[FreeTextEntry1] : patient with history of PAF HTN s/p GIORGIO cardioversion  twice in 2011 , 2015  , 2018, HTN, asthma    Patient  had surgery for AAA , aortic root, valve , arch replacement ,  closed BLESSING ,9/13/18 , with post op recurrence afib ,  Patient was cardioverted sinus rhythm   came for follow up , says he is doing ok ,  had echo showed no significant change from prior . does bike regularly \par \par occasional isolated skipped heart beat , patient  phone recording reviewed , appears to be symptomatic PVC , does take extra dose of metoprolol  , \par denies any chest pain or shortness of breath or dizziness , \par \par Patient had   CT angio of chest  as patient AVR with root replacement ,ascending and arch  showed stable changes \par  \par blood work august 21  normal  LDL 79 his blood pressure is controlled , \par \par

## 2022-03-24 NOTE — PHYSICAL EXAM

## 2022-03-24 NOTE — CARDIOLOGY SUMMARY
[de-identified] : 11/12/21  sinus bradycardia possible inferior infarct low QRS voltage  [de-identified] : 2/10/22  Mild LVH EF 64% Normal Bio AVR hx of ascending ,arch of aorta repair  ( no significant change )  [de-identified] : 9/16/21   3.5 cm  no change  [de-identified] : 9/13/18 Non obstructive disease   [de-identified] : 9/13/18  aortic arch , aortic root , valve replaced , closed BLESSING  [de-identified] : jan 2021 Minimal carotid atherosclerosis

## 2022-03-24 NOTE — DISCUSSION/SUMMARY
[Paroxysmal Atrial Fibrillation] : paroxysmal atrial fibrillation [Rhythm Control] : rhythm control [Anticoagulation] : anticoagulation [Episodic Cardioversion] : episodic cardioversion [Hyperlipidemia] : hyperlipidemia [Diet Modification] : diet modification [Exercise] : exercise [Weight Loss] : weight loss [Atrial Fibrillation] : atrial fibrillation [Stable] : stable [Serum Electrolytes] : serum electrolytes [Thyroid Function Tests] : thyroid function tests [Holter Monitor] : a Holter monitor [Echocardiogram] : an echocardiogram [Patient] : the patient [FreeTextEntry1] : Patient with above hx \par \par s/p ascending aorta , arch  reimplantation of coronaries , BLESSING occlusion with palpitation , recurrent atrial fibrillation ,  s/p cardioversion   now in sinus rhythm bradycardia ,  continue BB , continue ecotrin   stable ON CT \par \par Palpitations : appears to be due to symptomatic PVCS  bradycardia  , will continue  toprol to 75 mg po daily  monitor \par \par HTN .hypertensive heart disease  controlled ,continue current medication  low salt diet \par \par Hyperlipidemia  continue statin \par \par asthma : bullous lung disease stable ,  controlled  without inhaler \par \par follow up after 6  months  \par

## 2022-03-25 LAB
25(OH)D3 SERPL-MCNC: 63.7 NG/ML
ALBUMIN SERPL ELPH-MCNC: 4.4 G/DL
ALP BLD-CCNC: 84 U/L
ALT SERPL-CCNC: 29 U/L
ANION GAP SERPL CALC-SCNC: 12 MMOL/L
APPEARANCE: CLEAR
AST SERPL-CCNC: 25 U/L
BASOPHILS # BLD AUTO: 0.03 K/UL
BASOPHILS NFR BLD AUTO: 0.7 %
BILIRUB SERPL-MCNC: 0.6 MG/DL
BILIRUBIN URINE: NEGATIVE
BLOOD URINE: NEGATIVE
BUN SERPL-MCNC: 19 MG/DL
CALCIUM SERPL-MCNC: 9.4 MG/DL
CHLORIDE SERPL-SCNC: 104 MMOL/L
CHOLEST SERPL-MCNC: 184 MG/DL
CO2 SERPL-SCNC: 25 MMOL/L
COLOR: YELLOW
CREAT SERPL-MCNC: 1.1 MG/DL
EGFR: 68 ML/MIN/1.73M2
EOSINOPHIL # BLD AUTO: 0.07 K/UL
EOSINOPHIL NFR BLD AUTO: 1.6 %
ESTIMATED AVERAGE GLUCOSE: 114 MG/DL
GLUCOSE QUALITATIVE U: NEGATIVE
GLUCOSE SERPL-MCNC: 100 MG/DL
HBA1C MFR BLD HPLC: 5.6 %
HCT VFR BLD CALC: 46.8 %
HDLC SERPL-MCNC: 73 MG/DL
HGB BLD-MCNC: 15 G/DL
IMM GRANULOCYTES NFR BLD AUTO: 0.2 %
KETONES URINE: NEGATIVE
LDLC SERPL CALC-MCNC: 98 MG/DL
LEUKOCYTE ESTERASE URINE: NEGATIVE
LYMPHOCYTES # BLD AUTO: 1.3 K/UL
LYMPHOCYTES NFR BLD AUTO: 28.8 %
MAN DIFF?: NORMAL
MCHC RBC-ENTMCNC: 29.6 PG
MCHC RBC-ENTMCNC: 32.1 GM/DL
MCV RBC AUTO: 92.3 FL
MONOCYTES # BLD AUTO: 0.38 K/UL
MONOCYTES NFR BLD AUTO: 8.4 %
NEUTROPHILS # BLD AUTO: 2.72 K/UL
NEUTROPHILS NFR BLD AUTO: 60.3 %
NITRITE URINE: NEGATIVE
NONHDLC SERPL-MCNC: 110 MG/DL
PH URINE: 6
PLATELET # BLD AUTO: 241 K/UL
POTASSIUM SERPL-SCNC: 5.6 MMOL/L
PROT SERPL-MCNC: 7.2 G/DL
PROTEIN URINE: NORMAL
PSA FREE FLD-MCNC: 32 %
PSA FREE SERPL-MCNC: 0.43 NG/ML
PSA SERPL-MCNC: 1.33 NG/ML
RBC # BLD: 5.07 M/UL
RBC # FLD: 13.2 %
SODIUM SERPL-SCNC: 141 MMOL/L
SPECIFIC GRAVITY URINE: 1.03
TRIGL SERPL-MCNC: 65 MG/DL
TSH SERPL-ACNC: 2.45 UIU/ML
UROBILINOGEN URINE: NORMAL
WBC # FLD AUTO: 4.51 K/UL

## 2022-09-22 ENCOUNTER — APPOINTMENT (OUTPATIENT)
Dept: CARDIOLOGY | Facility: CLINIC | Age: 82
End: 2022-09-22

## 2022-09-22 ENCOUNTER — NON-APPOINTMENT (OUTPATIENT)
Age: 82
End: 2022-09-22

## 2022-09-22 VITALS
OXYGEN SATURATION: 97 % | BODY MASS INDEX: 26.07 KG/M2 | HEIGHT: 68 IN | SYSTOLIC BLOOD PRESSURE: 100 MMHG | DIASTOLIC BLOOD PRESSURE: 66 MMHG | WEIGHT: 172 LBS | HEART RATE: 54 BPM

## 2022-09-22 VITALS — SYSTOLIC BLOOD PRESSURE: 104 MMHG | DIASTOLIC BLOOD PRESSURE: 60 MMHG | HEART RATE: 54 BPM

## 2022-09-22 PROCEDURE — 99214 OFFICE O/P EST MOD 30 MIN: CPT

## 2022-09-22 PROCEDURE — 93000 ELECTROCARDIOGRAM COMPLETE: CPT

## 2022-09-22 NOTE — CARDIOLOGY SUMMARY
[de-identified] : 9/22/22  sinus bradycardia  low QRS voltage  [de-identified] : 2/10/22  Mild LVH EF 64% Normal Bio AVR hx of ascending ,arch of aorta repair  ( no significant change )  [de-identified] : 9/16/21   3.5 cm  no change  [de-identified] : 9/13/18 Non obstructive disease   [de-identified] : 9/13/18  aortic arch , aortic root , valve replaced , closed BLESSING  [de-identified] : jan 2021 Minimal carotid atherosclerosis

## 2022-09-22 NOTE — HISTORY OF PRESENT ILLNESS
[FreeTextEntry1] : patient with history of PAF HTN s/p GIORGIO cardioversion  twice in 2011 , 2015  , 2018, HTN, asthma    Patient  had surgery for AAA , aortic root, valve , arch replacement ,  closed BLESSING ,9/13/18 , with post op recurrence afib ,  Patient was cardioverted sinus rhythm   came for follow up , says he is doing ok ,  had echo showed no significant change from prior .   was seen  by CT surgeon recently , had CT SCAN which showed no significant change from prior \par \par occasional isolated skipped heart beat , patient  phone recording reviewed , appears to be symptomatic PVC , does take extra dose of metoprolol  , \par \par denies any chest pain or shortness of breath or dizziness , \par \par Patient had   CT angio of chest  as patient AVR with root replacement ,ascending and arch  showed stable changes \par  \par blood work march 22   normal  LDL 98  his blood pressure is controlled ,    repeat  BMP\par \par

## 2022-09-22 NOTE — DISCUSSION/SUMMARY
[FreeTextEntry1] : Patient with above hx \par \par s/p ascending aorta , arch  reimplantation of coronaries , BLESSING occlusion with palpitation , recurrent atrial fibrillation ,  s/p cardioversion   now in sinus rhythm bradycardia ,  continue BB , continue ecotrin   stable ON CT \par \par Palpitations : appears to be due to symptomatic PVCS  bradycardia  , will continue  toprol to 75 mg po daily  monitor \par \par HTN .hypertensive heart disease  controlled ,continue current medication  low salt diet \par \par Hyperlipidemia  continue statin  will order blood work \par \par asthma : bullous lung disease stable ,  controlled  without inhaler \par \par follow up after 6  months  \par

## 2022-09-23 LAB
ALBUMIN SERPL ELPH-MCNC: 4.3 G/DL
ALP BLD-CCNC: 73 U/L
ALT SERPL-CCNC: 27 U/L
ANION GAP SERPL CALC-SCNC: 9 MMOL/L
AST SERPL-CCNC: 24 U/L
BILIRUB SERPL-MCNC: 0.7 MG/DL
BUN SERPL-MCNC: 19 MG/DL
CALCIUM SERPL-MCNC: 9.4 MG/DL
CHLORIDE SERPL-SCNC: 103 MMOL/L
CHOLEST SERPL-MCNC: 173 MG/DL
CO2 SERPL-SCNC: 27 MMOL/L
CREAT SERPL-MCNC: 1.18 MG/DL
EGFR: 62 ML/MIN/1.73M2
GLUCOSE SERPL-MCNC: 99 MG/DL
HDLC SERPL-MCNC: 69 MG/DL
LDLC SERPL CALC-MCNC: 91 MG/DL
NONHDLC SERPL-MCNC: 105 MG/DL
POTASSIUM SERPL-SCNC: 4.7 MMOL/L
PROT SERPL-MCNC: 7 G/DL
SODIUM SERPL-SCNC: 138 MMOL/L
TRIGL SERPL-MCNC: 71 MG/DL

## 2022-10-05 NOTE — ASU PREOP CHECKLIST - ORDERS/MEDICATION ADMINISTRATION RECORD ON CHART
Pt reports urinary frequency recently. Today states she had not urinated. Currently receiving chemotherapy. States she has been eating and drinking well. Just DCed from rehab three days ago.
done

## 2023-01-11 NOTE — ED PROVIDER NOTE - CONSTITUTIONAL NOURISHMENT, MLM
TRANSESOPHAGEAL ECHOCARDIOGRAM ([place])      No driving for 24 hours after procedure  Do not make important / legal decisions within 24 hours after procedure  Do not drink any alcoholic beverages or sleeping pills for 24 hours   Advance your diet as tolerated  Continue all of your medication as directed  Call your doctor for the following symptoms:  [phone]     -Fever   -Difficulty swallowing   -Chest pain   -Difficulty breathing   -Coughing up blood or bloody sputum    Follow up with your doctor as instructed well

## 2023-02-24 ENCOUNTER — APPOINTMENT (OUTPATIENT)
Dept: CARDIOLOGY | Facility: CLINIC | Age: 83
End: 2023-02-24
Payer: MEDICARE

## 2023-02-24 ENCOUNTER — NON-APPOINTMENT (OUTPATIENT)
Age: 83
End: 2023-02-24

## 2023-02-24 VITALS
OXYGEN SATURATION: 98 % | DIASTOLIC BLOOD PRESSURE: 68 MMHG | WEIGHT: 170 LBS | HEIGHT: 68 IN | RESPIRATION RATE: 14 BRPM | HEART RATE: 60 BPM | BODY MASS INDEX: 25.76 KG/M2 | SYSTOLIC BLOOD PRESSURE: 120 MMHG

## 2023-02-24 VITALS
SYSTOLIC BLOOD PRESSURE: 120 MMHG | BODY MASS INDEX: 25.76 KG/M2 | TEMPERATURE: 97.6 F | HEIGHT: 68 IN | WEIGHT: 170 LBS | HEART RATE: 60 BPM | OXYGEN SATURATION: 98 % | DIASTOLIC BLOOD PRESSURE: 68 MMHG

## 2023-02-24 PROCEDURE — 99214 OFFICE O/P EST MOD 30 MIN: CPT

## 2023-02-24 PROCEDURE — 93000 ELECTROCARDIOGRAM COMPLETE: CPT

## 2023-02-24 NOTE — HISTORY OF PRESENT ILLNESS
[FreeTextEntry1] : patient with history of PAF HTN s/p GIORGIO cardioversion  twice in 2011 , 2015  , 2018, HTN, asthma    Patient  had surgery for AAA , aortic root, valve , arch replacement ,  closed BLESSING ,9/13/18 , with post op recurrence afib ,  Patient was cardioverted sinus rhythm   came for follow up , says he is feeling fine \par \par had echo showed no significant change from prior .   was seen  by CT surgeon recently , had CT SCAN which showed no significant change from prior \par \par occasional isolated skipped heart beat , patient  phone recording reviewed , appears to be symptomatic PVC , does take extra dose of metoprolol  , \par \par denies any chest pain or shortness of breath or dizziness , \par \par Patient had   CT angio of chest  as patient AVR with root replacement ,ascending and arch  showed stable changes \par  \par blood work march 22   normal  LDL 98  his blood pressure is controlled ,    repeat  BMP\par \par

## 2023-02-24 NOTE — REVIEW OF SYSTEMS
[Negative] : Heme/Lymph [SOB] : no shortness of breath [Chest Discomfort] : no chest discomfort [Palpitations] : no palpitations [Orthopnea] : no orthopnea [Cough] : no cough [Abdominal Pain] : no abdominal pain [Change in Appetite] : no change in appetite

## 2023-02-24 NOTE — DISCUSSION/SUMMARY
[FreeTextEntry1] : Patient with above hx \par \par s/p ascending aorta , arch  reimplantation of coronaries , BLESSING occlusion with palpitation , recurrent atrial fibrillation ,  s/p cardioversion   now in sinus rhythm bradycardia ,  continue BB , continue ecotrin   stable ON CT  follow up echo next visit \par \par Palpitations : appears to be due to symptomatic PVCS  bradycardia  , will continue  toprol to 75 mg po daily  monitor \par \par HTN .hypertensive heart disease  controlled ,continue current medication  low salt diet \par \par Hyperlipidemia  continue statin  \par \par asthma : bullous lung disease stable ,  controlled  without inhaler \par \par follow up after 6  months  \par  [EKG obtained to assist in diagnosis and management of assessed problem(s)] : EKG obtained to assist in diagnosis and management of assessed problem(s)

## 2023-02-24 NOTE — CARDIOLOGY SUMMARY
[de-identified] : 2/24/23   sinus bradycardia  low QRS voltage  [de-identified] : 2/10/22  Mild LVH EF 64% Normal Bio AVR hx of ascending ,arch of aorta repair  ( no significant change )  [de-identified] : 9/16/21   3.5 cm  no change  [de-identified] : 9/13/18 Non obstructive disease   [de-identified] : 9/13/18  aortic arch , aortic root , valve replaced , closed BLESSING  [de-identified] : jan 2021 Minimal carotid atherosclerosis

## 2023-02-24 NOTE — PHYSICAL EXAM

## 2023-04-17 NOTE — PATIENT PROFILE ADULT. - AGENT'S NAME
Frank Euceda Erivedge Counseling- I discussed with the patient the risks of Erivedge including but not limited to nausea, vomiting, diarrhea, constipation, weight loss, changes in the sense of taste, decreased appetite, muscle spasms, and hair loss.  The patient verbalized understanding of the proper use and possible adverse effects of Erivedge.  All of the patient's questions and concerns were addressed.

## 2023-05-08 ENCOUNTER — EMERGENCY (EMERGENCY)
Facility: HOSPITAL | Age: 83
LOS: 1 days | Discharge: HOME | End: 2023-05-08
Attending: EMERGENCY MEDICINE | Admitting: EMERGENCY MEDICINE
Payer: MEDICARE

## 2023-05-08 VITALS
HEART RATE: 51 BPM | TEMPERATURE: 98 F | RESPIRATION RATE: 16 BRPM | SYSTOLIC BLOOD PRESSURE: 137 MMHG | OXYGEN SATURATION: 98 % | DIASTOLIC BLOOD PRESSURE: 65 MMHG

## 2023-05-08 VITALS
WEIGHT: 169.98 LBS | DIASTOLIC BLOOD PRESSURE: 70 MMHG | SYSTOLIC BLOOD PRESSURE: 136 MMHG | HEIGHT: 68 IN | TEMPERATURE: 98 F | HEART RATE: 53 BPM | OXYGEN SATURATION: 98 % | RESPIRATION RATE: 14 BRPM

## 2023-05-08 DIAGNOSIS — I48.91 UNSPECIFIED ATRIAL FIBRILLATION: Chronic | ICD-10-CM

## 2023-05-08 PROCEDURE — 73110 X-RAY EXAM OF WRIST: CPT

## 2023-05-08 PROCEDURE — 99284 EMERGENCY DEPT VISIT MOD MDM: CPT | Mod: 25

## 2023-05-08 PROCEDURE — 73030 X-RAY EXAM OF SHOULDER: CPT | Mod: 26,LT

## 2023-05-08 PROCEDURE — 73080 X-RAY EXAM OF ELBOW: CPT

## 2023-05-08 PROCEDURE — 99284 EMERGENCY DEPT VISIT MOD MDM: CPT

## 2023-05-08 PROCEDURE — 73110 X-RAY EXAM OF WRIST: CPT | Mod: 26,LT

## 2023-05-08 PROCEDURE — 73030 X-RAY EXAM OF SHOULDER: CPT

## 2023-05-08 PROCEDURE — 73502 X-RAY EXAM HIP UNI 2-3 VIEWS: CPT

## 2023-05-08 PROCEDURE — 73502 X-RAY EXAM HIP UNI 2-3 VIEWS: CPT | Mod: 26,LT

## 2023-05-08 PROCEDURE — 90471 IMMUNIZATION ADMIN: CPT

## 2023-05-08 PROCEDURE — 90714 TD VACC NO PRESV 7 YRS+ IM: CPT

## 2023-05-08 PROCEDURE — 73080 X-RAY EXAM OF ELBOW: CPT | Mod: 26,LT

## 2023-05-08 RX ORDER — TETANUS AND DIPHTHERIA TOXOIDS ADSORBED 2; 2 [LF]/.5ML; [LF]/.5ML
0.5 INJECTION INTRAMUSCULAR ONCE
Refills: 0 | Status: COMPLETED | OUTPATIENT
Start: 2023-05-08 | End: 2023-05-08

## 2023-05-08 RX ADMIN — TETANUS AND DIPHTHERIA TOXOIDS ADSORBED 0.5 MILLILITER(S): 2; 2 INJECTION INTRAMUSCULAR at 10:53

## 2023-05-08 NOTE — ED PROVIDER NOTE - CARE PROVIDER_API CALL
Anthony Killian)  Orthopedics  [address], [address]  [place] [address]  Phone: [phone]  Fax: [phone]  Follow Up Time:

## 2023-05-08 NOTE — ED PROVIDER NOTE - NSICDXPASTMEDICALHX_GEN_ALL_CORE_FT
PAST MEDICAL HISTORY:  AF (atrial fibrillation)     BPH (Benign Prostatic Hyperplasia)     HLD (hyperlipidemia)     HTN (hypertension)     Sepsis due to Escherichia coli     Thoracic aortic aneurysm without rupture     UTI (urinary tract infection)

## 2023-05-08 NOTE — ED ADULT NURSE NOTE - NSFALLRSKASSESASSIST_ED_ALL_ED
[address]  Progress Note and Procedure Note      Refugio Chapman  AGE: 63 y.o. GENDER: female  : 1959  TODAY'S DATE:  2020    Subjective:     CHIEF COMPLAINT left lower leg     HISTORY of PRESENT ILLNESS HPI   Refugio Chapman is a 63 y.o. female who presents today for wound/ulcer evaluation. Ulcer Type:non-healing surgical  Ulcer/Wound Location:left lower leg  Contributing factors:edema, venous stasis, lymphedema and obesity    Patient Active Problem List   Diagnosis Code    Bipolar I disorder with mixed features ([address] F31.9    Hypertension I10    Dry mouth R68.2    RLS (restless legs syndrome) G25.81    Plantar fasciitis M72.2    Chronic midline low back pain without sciatica M54.5, G89.29    Sleep apnea, obstructive G47.33    Asterixis R27.8    Tremor, essential G25.0    Hyperglycemia R73.9    Moderate episode of recurrent major depressive disorder (HCC) F33.1    Nasal septal perforation J34.89    Chronic left maxillary sinusitis H15.7    Biliary colic B31.45    BMI 03.9-63.5,NCLIP Z68.39    Edema R60.9    History of spontaneous  Z87.59    Hyperlipidemia E78.5    Spondylosis with myelopathy, lumbar region M47.16    Cellulitis of left lower extremity L03. 116    Insect bite of left lower leg S80.862A, W57. XXXA    Cellulitis of left foot L03. 116    Toxic effect of spider venom, accidental or unintentional, sequela T63.301S    Bilateral calf pain M79.661, M79.662    Skin ulcer of left lower leg with fat layer exposed ([facility]) L97.922    Tobacco abuse Z72.0    Venous stasis dermatitis of both lower extremities I87.2    Lymphedema of both lower extremities I89.0       ALLERGIES    Allergies   Allergen Reactions    Bactrim [Sulfamethoxazole-Trimethoprim]     Hctz [Hydrochlorothiazide] Other (See Comments)     Acid reflux      Sitagliptin Rash           Objective:      /71   Pulse 76   Temp 97 °F (36.1 °C) (Temporal)   Resp 16 Ht 5' (1.524 m)   Wt 171 lb (77.6 kg)   BMI 33.40 kg/m²         Assessment:      Problem List Items Addressed This Visit     Venous stasis dermatitis of both lower extremities (Chronic)    Lymphedema of both lower extremities (Chronic)    Skin ulcer of left lower leg with fat layer exposed ([address]    Tobacco abuse          The patients pain isPain Level: 10 Pain Type: Acute pain. Wound(s) is unchanged. Please refer to nursing measurements and assessment regarding wound pre and post debridement. Wound 07/03/20 Leg Left;Posterior (Active)   Wound Image   07/21/20 1138   Wound Venous 07/21/20 1138   Dressing Status Old drainage 07/21/20 1138   Dressing Changed Changed/New 07/07/20 1449   Dressing/Treatment Moist to dry;ABD;Roll gauze 07/07/20 1449   Wound Cleansed Rinsed/Irrigated with saline 07/21/20 1138   Wound Length (cm) 8.5 cm 07/21/20 1138   Wound Width (cm) 9 cm 07/21/20 1138   Wound Depth (cm) 0.5 cm 07/21/20 1138   Wound Surface Area (cm^2) 76.5 cm^2 07/21/20 1138   Change in Wound Size % (l*w) -19.53 07/21/20 1138   Wound Volume (cm^3) 38.25 cm^3 07/21/20 1138   Wound Healing % 0 07/21/20 1138   Post-Procedure Length (cm) 8.5 cm 07/21/20 1204   Post-Procedure Width (cm) 9 cm 07/21/20 1204   Post-Procedure Depth (cm) 0.5 cm 07/21/20 1204   Post-Procedure Surface Area (cm^2) 76.5 cm^2 07/21/20 1204   Post-Procedure Volume (cm^3) 38.25 cm^3 07/21/20 1204   Distance Tunneling (cm) 0 cm 07/21/20 1138   Tunneling Position ___ O'Clock 0 07/21/20 1138   Undermining Starts ___ O'Clock 0 07/21/20 1138   Undermining Ends___ O'Clock 0 07/21/20 1138   Undermining Maxium Distance (cm) 0 07/21/20 1138   Wound Assessment Red;Slough; Hyper granulation tissue;[place] 07/21/20 1138   Drainage Amount Moderate 07/21/20 1138   Drainage Description Serosanguinous; Yellow 07/21/20 1138   Odor None 07/21/20 1138   Margins Attached edges 07/21/20 1138   Hailey-wound Assessment Red; Swelling 07/21/20 1138   Non-staged Wound Description Full thickness 07/21/20 1138   Gonzalez%Wound Bed 10 07/07/20 1449   Red%Wound Bed 0 07/07/20 1449   Yellow%Wound Bed 90 07/07/20 1449   Number of days: 17           Procedure Note  Indications:  Based on my examination of this patient's wound(s)/ulcer(s) today, debridement is required to promote healing and evaluate the wound base. Performed by: Noris Lane MD    Consent obtained:  Yes    Time out taken:  Yes    Pain Control: Anesthetic  Anesthetic: 2% Lidocaine Gel Topical       Debridement: Excisional Debridement    Using curette the wound(s)/ulcer(s) was/were sharply debrided down through and including the removal of epidermis, dermis and subcutaneous tissue. Devitalized Tissue Debrided:  fibrin, biofilm, slough and exudate    Pre Debridement Measurements:  Are located in the Wound/Ulcer Documentation Flow Sheet    Wound/Ulcer #: 1    Post Debridement Measurements:  Wound/Ulcer Descriptions are Pre Debridement except measurements:          Percent of Wound(s)/Ulcer(s) Debrided: 100%    Total Surface Area Debrided:  76.5 sq cm     Diabetic/Pressure/Non Pressure Ulcers only:  Ulcer: Non-Pressure ulcer, fat layer exposed     Estimated Blood Loss:  Minimal    Hemostasis Achieved:  not needed    Response to treatment:  Well tolerated by patient., With complaints of pain. Plan:          Plan for wound - Dress per physician order  Treatment:     Compression : No   Offloading : Yes   Dressing : see AVS   Additional Therapy : none     1. Discussed appropriate home care of this wound. Wound redressed. 2. Written patient dismissal instructions given to patient and signed by patient or POA. 3. Follow up: 2 week(s). Ms. Ale Saul held her santyl because it was burning. She elevates her legs some, but not as much as she knows she should. She has obvious venous disease and edema. Will order LEAs, and if her arterial flow is okay, will proceed with compression.  Did discuss with her continuing her santyl, and changing the outer dressing more frequently to keep the drainage off her skin, as well as using a larger area of barrier cream. Discussed elevating her leg more, and continuing to work on decreased smoking.      Electronically signed by Arsenio Fong MD on 7/21/2020 at 12:17 PM no
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
## 2023-05-08 NOTE — ED PROVIDER NOTE - PATIENT PORTAL LINK FT
You can access the FollowMyHealth Patient Portal offered by [facility] by registering at the following website: [email or web address]. By joining [facility]’s FollowMyHealth portal, you will also be able to view your health information using other applications (apps) compatible with our system.
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
## 2023-05-08 NOTE — ED ADULT NURSE NOTE - HOW OFTEN DO YOU HAVE A DRINK CONTAINING ALCOHOL?
----- Message from Grace Andino sent at 3/13/2018  4:54 PM EDT -----  Regarding: Dr. Sim Kaylee: [phone]  Ms. Glen Suazo, from [facility], requesting to speak with Dr. Kori Manriquez in regards to requisition for Renvela 2.4 grams powder. Monthly or less

## 2023-05-08 NOTE — ED PROVIDER NOTE - NSFOLLOWUPINSTRUCTIONS_ED_ALL_ED_FT
1. Follow-up with your Primary Medical Doctor or referred doctor. Call today / next business day for prompt follow-up.  2. Return to Emergency room for any worsening or persistent pain, shortness of breath, chest pains, abdominal pain, numbness, tingling, headaches, vomiting, visual changes, dizziness, weakness, fever, or any other concerning symptoms.  3. See attached instruction sheets for additional information, including information regarding signs and symptoms to look out for, reasons to seek immediate care and other important instructions.  4. Follow-up with orthopedics, call today for prompt follow-up  5.  Tylenol as needed for pain  6.  Wrist splint as discussed

## 2023-05-08 NOTE — ED PROVIDER NOTE - PHYSICAL EXAMINATION
· CONSTITUTIONAL: Well appearing, well nourished, awake, alert, oriented to person, place, time/situation and in no apparent distress. non-toxic, well appearing.   · ENMT: Airway patent, Nasal mucosa clear. Mouth with normal mucosa. Throat has no vesicles, no oropharyngeal exudates and uvula is midline. MM moist.  no external signs of head trauma.  · EYES: Clear bilaterally, pupils equal, round and reactive to light. Extra-ocular muscles intact.  · CARDIAC: Normal rate, regular rhythm.  Heart sounds S1, S2.  No murmurs, rubs or gallops.  · RESPIRATORY: Breath sounds clear and equal bilaterally. nl resp effort.  No Wheeze / Rhonci / Rales.  · GASTROINTESTINAL: Abdomen soft, non-tender, no guarding. non-distended. no hsm. no CVA tenderness. no acute signs of truncal trauma.  · GENITOURINARY:  Bladder: non-tender / non-distended  · MUSCULOSKELETAL: Spine appears normal, No spinal tenderness (Cervical, thoracic, Lumbar). Range of motion in all extremities is not limited, no muscle or joint tenderness except as noted  Positive minimal tenderness to left lateral elbow with superficial abrasions.  No open laceration.  Left shoulder with full range of motion with mild discomfort.  Left wrist with mild tenderness to distal ulna with mild edema diffusely.  Normal hand..  No other acute signs of trauma.  · NEUROLOGICAL: Alert and oriented, no focal deficits, no motor or sensory deficits. Normal, non-focal detailed neurologic exam.  · SKIN: Skin normal color for race, warm, dry and intact. No evidence of rash.  · HEME LYMPH: No adenopathy or splenomegaly.

## 2023-05-26 NOTE — PROGRESS NOTE ADULT - PROBLEM SELECTOR PLAN 5
Is currently improving.
Continue Proscar and Flomax for BPH.  Urology follow up as out patient.
Continue Proscar and Flomax for BPH.  Urology follow up as out patient.
Continue Metoprolol, Norvasc and Cozaar with holding parameters.   Monitor blood pressure per protocol.

## 2023-07-10 LAB
25(OH)D3 SERPL-MCNC: 52.5 NG/ML
ALBUMIN SERPL ELPH-MCNC: 4 G/DL
ALP BLD-CCNC: 87 U/L
ALT SERPL-CCNC: 37 U/L
ANION GAP SERPL CALC-SCNC: 13 MMOL/L
APPEARANCE: CLEAR
AST SERPL-CCNC: 33 U/L
BILIRUB SERPL-MCNC: 0.6 MG/DL
BILIRUBIN URINE: NEGATIVE
BLOOD URINE: NEGATIVE
BUN SERPL-MCNC: 14 MG/DL
CALCIUM SERPL-MCNC: 8.9 MG/DL
CHLORIDE SERPL-SCNC: 99 MMOL/L
CHOLEST SERPL-MCNC: 124 MG/DL
CO2 SERPL-SCNC: 25 MMOL/L
COLOR: YELLOW
CREAT SERPL-MCNC: 1.02 MG/DL
EGFR: 74 ML/MIN/1.73M2
ESTIMATED AVERAGE GLUCOSE: 111 MG/DL
GLUCOSE QUALITATIVE U: NEGATIVE MG/DL
GLUCOSE SERPL-MCNC: 86 MG/DL
HBA1C MFR BLD HPLC: 5.5 %
HDLC SERPL-MCNC: 43 MG/DL
KETONES URINE: NEGATIVE MG/DL
LDLC SERPL CALC-MCNC: 67 MG/DL
LEUKOCYTE ESTERASE URINE: NEGATIVE
NITRITE URINE: NEGATIVE
NONHDLC SERPL-MCNC: 81 MG/DL
PH URINE: 6.5
POTASSIUM SERPL-SCNC: 4.4 MMOL/L
PROT SERPL-MCNC: 6.9 G/DL
PROTEIN URINE: NEGATIVE MG/DL
PSA FREE FLD-MCNC: 34 %
PSA FREE SERPL-MCNC: 0.42 NG/ML
PSA SERPL-MCNC: 1.25 NG/ML
SODIUM SERPL-SCNC: 137 MMOL/L
SPECIFIC GRAVITY URINE: 1.01
TRIGL SERPL-MCNC: 65 MG/DL
TSH SERPL-ACNC: 3.15 UIU/ML
UROBILINOGEN URINE: 0.2 MG/DL

## 2023-07-18 DIAGNOSIS — R10.9 UNSPECIFIED ABDOMINAL PAIN: ICD-10-CM

## 2023-07-25 ENCOUNTER — NON-APPOINTMENT (OUTPATIENT)
Age: 83
End: 2023-07-25

## 2023-08-07 ENCOUNTER — APPOINTMENT (OUTPATIENT)
Dept: ORTHOPEDIC SURGERY | Facility: CLINIC | Age: 83
End: 2023-08-07
Payer: MEDICARE

## 2023-08-07 DIAGNOSIS — S69.82XD OTHER SPECIFIED INJURIES OF LEFT WRIST, HAND AND FINGER(S), SUBSEQUENT ENCOUNTER: ICD-10-CM

## 2023-08-07 DIAGNOSIS — M25.832 OTHER SPECIFIED JOINT DISORDERS, LEFT WRIST: ICD-10-CM

## 2023-08-07 PROCEDURE — SPL02: CPT | Mod: 25

## 2023-08-07 PROCEDURE — 20605 DRAIN/INJ JOINT/BURSA W/O US: CPT | Mod: LT

## 2023-08-07 PROCEDURE — 99204 OFFICE O/P NEW MOD 45 MIN: CPT | Mod: 25

## 2023-08-07 NOTE — PHYSICAL EXAM
[de-identified] : Patient is alert, oriented and in no acute distress. Affect and general appearance are normal and patient is able to answer questions appropriately. On the left, there is full range of motion of the elbow, wrist, and fingers. There is tenderness to palpation over the ulnar fovea. Nontender over the flexor carpi ulnaris and extensor carpi ulnaris insertions. No subluxation of the ECU tendon. Pain with forced ulnar deviation of the wrist.   Neurologic: Median, ulnar, and radial motor and sensory are intact.  Skin: No cyanosis, clubbing, edema or rashes.  Vascular: Radial pulses intact.  Lymphatic: No streaking or epitrochlear adenopathy. [de-identified] : The images of the left wrist from 5/8/23 taken at [facility] were independently interpreted and reviewed with the patient. No evidence of acute bony trauma. 1 mm ulnar positive variance with slight flattening of the ulnar head.
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
## 2023-08-07 NOTE — REVIEW OF SYSTEMS
[Joint Pain] : joint pain [Negative] : Allergic/Immunologic [Joint Stiffness] : no joint stiffness [Joint Swelling] : joint swelling

## 2023-08-07 NOTE — PROCEDURE
[FreeTextEntry1] : Discussion was had regarding risks, complications, limitations, and expectations. Risks include, but are not limited to, infection, swelling, bleeding, and stiffness. After full discussion of treatment alternatives, and associated risks, and with patient consent, steroid injection was administered. Following sterile prep with sterile method, 0.5 cc 1% lidocaine with epinephrine and 6 mg celestone were injected into the left ulnocarpal joint without complication and was well tolerated.

## 2023-08-07 NOTE — ASSESSMENT
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 We discussed the uncertain prognosis and nature of this condition as well as treatment options.  A cortisone injection was provided today. The patient is recommended to try activity modification and NSAIDs.The patient understands that it may take up to 48 hours for the steroid to begin working and will continue to decrease inflammation over the course of one week. The area will be sore when the local anesthetic wears off in a few hours. We also discussed the use of the Wrist Widget to provide axial traction on the ulnar side of the wrist for symptomatic relief during activities. This was provided in the office today. He may follow up as needed.

## 2023-08-25 ENCOUNTER — APPOINTMENT (OUTPATIENT)
Dept: CARDIOLOGY | Facility: CLINIC | Age: 83
End: 2023-08-25
Payer: MEDICARE

## 2023-08-25 VITALS
HEIGHT: 68 IN | HEART RATE: 57 BPM | DIASTOLIC BLOOD PRESSURE: 56 MMHG | BODY MASS INDEX: 24.4 KG/M2 | SYSTOLIC BLOOD PRESSURE: 112 MMHG | OXYGEN SATURATION: 97 % | WEIGHT: 161 LBS

## 2023-08-25 VITALS
SYSTOLIC BLOOD PRESSURE: 112 MMHG | BODY MASS INDEX: 24.4 KG/M2 | WEIGHT: 161 LBS | HEART RATE: 50 BPM | HEIGHT: 68 IN | DIASTOLIC BLOOD PRESSURE: 56 MMHG | OXYGEN SATURATION: 97 % | TEMPERATURE: 98 F | RESPIRATION RATE: 14 BRPM

## 2023-08-25 DIAGNOSIS — I34.0 NONRHEUMATIC MITRAL (VALVE) INSUFFICIENCY: ICD-10-CM

## 2023-08-25 PROCEDURE — 99214 OFFICE O/P EST MOD 30 MIN: CPT

## 2023-08-25 PROCEDURE — 93000 ELECTROCARDIOGRAM COMPLETE: CPT

## 2023-08-25 NOTE — PHYSICAL EXAM

## 2023-08-25 NOTE — CARDIOLOGY SUMMARY
[de-identified] : 8/25/23  sinus bradycardia  low QRS voltage  [de-identified] : 2/10/22  Mild LVH EF 64% Normal Bio AVR hx of ascending ,arch of aorta repair  ( no significant change )  [de-identified] : 9/16/21   3.5 cm  no change  [de-identified] : 9/13/18 Non obstructive disease   [de-identified] : 9/13/18  aortic arch , aortic root , valve replaced , closed BLESSING  [de-identified] : jan 2021 Minimal carotid atherosclerosis

## 2023-08-25 NOTE — DISCUSSION/SUMMARY
[FreeTextEntry1] : Patient with above hx   s/p ascending aorta , arch  reimplantation of coronaries , BLESSING occlusion with palpitation , recurrent atrial fibrillation ,  s/p cardioversion   now in marked sinus bradycardia , decrease lopressor to 25 mg po BID ,  , continue ecotrin   stable size on follow up  CT   Palpitations : appears to be due to symptomatic PVCS  bradycardia  , will continue  toprol  50mg po daily  monitor   HTN .hypertensive heart disease  controlled ,continue current medication  low salt diet   Hyperlipidemia  continue statin    asthma : bullous lung disease stable ,  controlled  without inhaler   follow up after 6  months    [EKG obtained to assist in diagnosis and management of assessed problem(s)] : EKG obtained to assist in diagnosis and management of assessed problem(s)

## 2023-08-25 NOTE — HISTORY OF PRESENT ILLNESS
[FreeTextEntry1] : patient with history of PAF HTN s/p GIORGIO cardioversion  twice in 2011 , 2015  , 2018, asthma    Patient  had surgery for AAA , aortic root, valve , arch replacement ,  closed BLESSING ,9/13/18 , with post op recurrence afib ,  Patient was cardioverted sinus rhythm   came for follow up , says he is feeling fine  , his ekg showed marked bradycardia   45   denies any chest pain or shortness of breath or dizziness ,   Patient had  CT SCAN which showed no significant change from prior   occasional isolated skipped heart beat , patient  phone recording reviewed , appears to be symptomatic PVC , does take extra dose of metoprolol  ,   Patient had   CT angio of chest  as patient AVR with root replacement ,ascending and arch  showed stable changes    blood work march 22   normal  LDL 98  his blood pressure is controlled ,

## 2023-09-08 ENCOUNTER — APPOINTMENT (OUTPATIENT)
Dept: CARDIOLOGY | Facility: CLINIC | Age: 83
End: 2023-09-08

## 2023-09-25 ENCOUNTER — APPOINTMENT (OUTPATIENT)
Dept: CARDIOLOGY | Facility: CLINIC | Age: 83
End: 2023-09-25
Payer: MEDICARE

## 2023-09-25 PROCEDURE — 93306 TTE W/DOPPLER COMPLETE: CPT

## 2024-01-03 NOTE — ED PROVIDER NOTE - WR ORDER NAME 3

## 2024-01-26 ENCOUNTER — NON-APPOINTMENT (OUTPATIENT)
Age: 84
End: 2024-01-26

## 2024-01-26 ENCOUNTER — APPOINTMENT (OUTPATIENT)
Dept: CARDIOLOGY | Facility: CLINIC | Age: 84
End: 2024-01-26
Payer: MEDICARE

## 2024-01-26 ENCOUNTER — INPATIENT (INPATIENT)
Facility: HOSPITAL | Age: 84
LOS: 2 days | Discharge: HOME | DRG: 310 | End: 2024-01-29
Attending: INTERNAL MEDICINE | Admitting: FAMILY MEDICINE
Payer: MEDICARE

## 2024-01-26 VITALS
HEIGHT: 68 IN | DIASTOLIC BLOOD PRESSURE: 70 MMHG | SYSTOLIC BLOOD PRESSURE: 134 MMHG | OXYGEN SATURATION: 99 % | BODY MASS INDEX: 24.55 KG/M2 | WEIGHT: 162 LBS | HEART RATE: 53 BPM

## 2024-01-26 VITALS
WEIGHT: 162 LBS | TEMPERATURE: 98 F | SYSTOLIC BLOOD PRESSURE: 134 MMHG | HEART RATE: 53 BPM | BODY MASS INDEX: 24.55 KG/M2 | OXYGEN SATURATION: 99 % | HEIGHT: 68 IN | RESPIRATION RATE: 14 BRPM | DIASTOLIC BLOOD PRESSURE: 70 MMHG

## 2024-01-26 VITALS
HEART RATE: 55 BPM | RESPIRATION RATE: 18 BRPM | OXYGEN SATURATION: 97 % | TEMPERATURE: 98 F | DIASTOLIC BLOOD PRESSURE: 74 MMHG | SYSTOLIC BLOOD PRESSURE: 145 MMHG

## 2024-01-26 DIAGNOSIS — I49.3 VENTRICULAR PREMATURE DEPOLARIZATION: ICD-10-CM

## 2024-01-26 DIAGNOSIS — I48.0 PAROXYSMAL ATRIAL FIBRILLATION: ICD-10-CM

## 2024-01-26 DIAGNOSIS — I48.91 UNSPECIFIED ATRIAL FIBRILLATION: Chronic | ICD-10-CM

## 2024-01-26 DIAGNOSIS — Z95.2 PRESENCE OF PROSTHETIC HEART VALVE: ICD-10-CM

## 2024-01-26 LAB
ALBUMIN SERPL ELPH-MCNC: 3.9 G/DL — SIGNIFICANT CHANGE UP (ref 3.3–5)
ALP SERPL-CCNC: 80 U/L — SIGNIFICANT CHANGE UP (ref 40–120)
ALT FLD-CCNC: 37 U/L — SIGNIFICANT CHANGE UP (ref 12–78)
APTT BLD: 31.2 SEC — SIGNIFICANT CHANGE UP (ref 24.5–35.6)
AST SERPL-CCNC: 19 U/L — SIGNIFICANT CHANGE UP (ref 15–37)
BASOPHILS # BLD AUTO: 0.03 K/UL — SIGNIFICANT CHANGE UP (ref 0–0.2)
BASOPHILS NFR BLD AUTO: 0.7 % — SIGNIFICANT CHANGE UP (ref 0–2)
BILIRUB SERPL-MCNC: 0.8 MG/DL — SIGNIFICANT CHANGE UP (ref 0.2–1.2)
BUN SERPL-MCNC: 20 MG/DL — SIGNIFICANT CHANGE UP (ref 7–23)
CALCIUM SERPL-MCNC: 9 MG/DL — SIGNIFICANT CHANGE UP (ref 8.5–10.1)
CHLORIDE SERPL-SCNC: 109 MMOL/L — HIGH (ref 96–108)
CO2 SERPL-SCNC: 30 MMOL/L — SIGNIFICANT CHANGE UP (ref 22–31)
CREAT SERPL-MCNC: 1.07 MG/DL — SIGNIFICANT CHANGE UP (ref 0.5–1.3)
EGFR: 69 ML/MIN/1.73M2 — SIGNIFICANT CHANGE UP
EOSINOPHIL # BLD AUTO: 0.08 K/UL — SIGNIFICANT CHANGE UP (ref 0–0.5)
EOSINOPHIL NFR BLD AUTO: 1.9 % — SIGNIFICANT CHANGE UP (ref 0–6)
GLUCOSE SERPL-MCNC: 116 MG/DL — HIGH (ref 70–99)
HCT VFR BLD CALC: 45 % — SIGNIFICANT CHANGE UP (ref 39–50)
HGB BLD-MCNC: 15.4 G/DL — SIGNIFICANT CHANGE UP (ref 13–17)
IMM GRANULOCYTES NFR BLD AUTO: 0.2 % — SIGNIFICANT CHANGE UP (ref 0–0.9)
INR BLD: 0.94 RATIO — SIGNIFICANT CHANGE UP (ref 0.85–1.18)
LYMPHOCYTES # BLD AUTO: 1.19 K/UL — SIGNIFICANT CHANGE UP (ref 1–3.3)
LYMPHOCYTES # BLD AUTO: 28.5 % — SIGNIFICANT CHANGE UP (ref 13–44)
MAGNESIUM SERPL-MCNC: 2.4 MG/DL — SIGNIFICANT CHANGE UP (ref 1.6–2.6)
MCHC RBC-ENTMCNC: 30.9 PG — SIGNIFICANT CHANGE UP (ref 27–34)
MCHC RBC-ENTMCNC: 34.2 GM/DL — SIGNIFICANT CHANGE UP (ref 32–36)
MCV RBC AUTO: 90.2 FL — SIGNIFICANT CHANGE UP (ref 80–100)
MONOCYTES # BLD AUTO: 0.38 K/UL — SIGNIFICANT CHANGE UP (ref 0–0.9)
MONOCYTES NFR BLD AUTO: 9.1 % — SIGNIFICANT CHANGE UP (ref 2–14)
NEUTROPHILS # BLD AUTO: 2.48 K/UL — SIGNIFICANT CHANGE UP (ref 1.8–7.4)
NEUTROPHILS NFR BLD AUTO: 59.6 % — SIGNIFICANT CHANGE UP (ref 43–77)
NT-PROBNP SERPL-SCNC: 324 PG/ML — SIGNIFICANT CHANGE UP (ref 0–450)
PLATELET # BLD AUTO: 219 K/UL — SIGNIFICANT CHANGE UP (ref 150–400)
POTASSIUM SERPL-MCNC: 4.1 MMOL/L — SIGNIFICANT CHANGE UP (ref 3.5–5.3)
POTASSIUM SERPL-SCNC: 4.1 MMOL/L — SIGNIFICANT CHANGE UP (ref 3.5–5.3)
PROT SERPL-MCNC: 7.7 GM/DL — SIGNIFICANT CHANGE UP (ref 6–8.3)
PROTHROM AB SERPL-ACNC: 10.6 SEC — SIGNIFICANT CHANGE UP (ref 9.5–13)
RBC # BLD: 4.99 M/UL — SIGNIFICANT CHANGE UP (ref 4.2–5.8)
RBC # FLD: 13.1 % — SIGNIFICANT CHANGE UP (ref 10.3–14.5)
SODIUM SERPL-SCNC: 139 MMOL/L — SIGNIFICANT CHANGE UP (ref 135–145)
TROPONIN I, HIGH SENSITIVITY RESULT: 11.4 NG/L — SIGNIFICANT CHANGE UP
WBC # BLD: 4.17 K/UL — SIGNIFICANT CHANGE UP (ref 3.8–10.5)
WBC # FLD AUTO: 4.17 K/UL — SIGNIFICANT CHANGE UP (ref 3.8–10.5)

## 2024-01-26 PROCEDURE — 99222 1ST HOSP IP/OBS MODERATE 55: CPT

## 2024-01-26 PROCEDURE — 93000 ELECTROCARDIOGRAM COMPLETE: CPT | Mod: 59

## 2024-01-26 PROCEDURE — 71045 X-RAY EXAM CHEST 1 VIEW: CPT | Mod: 26

## 2024-01-26 PROCEDURE — 80061 LIPID PANEL: CPT

## 2024-01-26 PROCEDURE — 99215 OFFICE O/P EST HI 40 MIN: CPT

## 2024-01-26 PROCEDURE — 87635 SARS-COV-2 COVID-19 AMP PRB: CPT

## 2024-01-26 PROCEDURE — 93308 TTE F-UP OR LMTD: CPT

## 2024-01-26 PROCEDURE — 78452 HT MUSCLE IMAGE SPECT MULT: CPT

## 2024-01-26 PROCEDURE — 93005 ELECTROCARDIOGRAM TRACING: CPT

## 2024-01-26 PROCEDURE — 99223 1ST HOSP IP/OBS HIGH 75: CPT

## 2024-01-26 PROCEDURE — 36415 COLL VENOUS BLD VENIPUNCTURE: CPT

## 2024-01-26 PROCEDURE — 83735 ASSAY OF MAGNESIUM: CPT

## 2024-01-26 PROCEDURE — 93010 ELECTROCARDIOGRAM REPORT: CPT

## 2024-01-26 PROCEDURE — 84484 ASSAY OF TROPONIN QUANT: CPT

## 2024-01-26 PROCEDURE — 99285 EMERGENCY DEPT VISIT HI MDM: CPT

## 2024-01-26 PROCEDURE — A9500: CPT

## 2024-01-26 PROCEDURE — G2211 COMPLEX E/M VISIT ADD ON: CPT

## 2024-01-26 PROCEDURE — 80048 BASIC METABOLIC PNL TOTAL CA: CPT

## 2024-01-26 PROCEDURE — 93017 CV STRESS TEST TRACING ONLY: CPT

## 2024-01-26 PROCEDURE — 99497 ADVNCD CARE PLAN 30 MIN: CPT | Mod: 25

## 2024-01-26 RX ORDER — AMLODIPINE BESYLATE 2.5 MG/1
1 TABLET ORAL
Refills: 0 | DISCHARGE

## 2024-01-26 RX ORDER — ATORVASTATIN CALCIUM 80 MG/1
10 TABLET, FILM COATED ORAL AT BEDTIME
Refills: 0 | Status: DISCONTINUED | OUTPATIENT
Start: 2024-01-26 | End: 2024-01-29

## 2024-01-26 RX ORDER — MAGNESIUM OXIDE 400 MG ORAL TABLET 241.3 MG
400 TABLET ORAL DAILY
Refills: 0 | Status: DISCONTINUED | OUTPATIENT
Start: 2024-01-27 | End: 2024-01-29

## 2024-01-26 RX ORDER — SOTALOL HCL 120 MG
80 TABLET ORAL EVERY 12 HOURS
Refills: 0 | Status: DISCONTINUED | OUTPATIENT
Start: 2024-01-27 | End: 2024-01-29

## 2024-01-26 RX ORDER — AMLODIPINE BESYLATE 2.5 MG/1
10 TABLET ORAL DAILY
Refills: 0 | Status: DISCONTINUED | OUTPATIENT
Start: 2024-01-26 | End: 2024-01-29

## 2024-01-26 RX ORDER — FAMOTIDINE 10 MG/ML
20 INJECTION INTRAVENOUS DAILY
Refills: 0 | Status: DISCONTINUED | OUTPATIENT
Start: 2024-01-26 | End: 2024-01-29

## 2024-01-26 RX ORDER — LOSARTAN POTASSIUM 100 MG/1
1 TABLET, FILM COATED ORAL
Refills: 0 | DISCHARGE

## 2024-01-26 RX ORDER — ZOLPIDEM TARTRATE 10 MG/1
5 TABLET ORAL AT BEDTIME
Refills: 0 | Status: DISCONTINUED | OUTPATIENT
Start: 2024-01-26 | End: 2024-01-29

## 2024-01-26 RX ORDER — LOSARTAN POTASSIUM 100 MG/1
100 TABLET, FILM COATED ORAL DAILY
Refills: 0 | Status: DISCONTINUED | OUTPATIENT
Start: 2024-01-26 | End: 2024-01-29

## 2024-01-26 RX ORDER — FAMOTIDINE 10 MG/ML
1 INJECTION INTRAVENOUS
Refills: 0 | DISCHARGE

## 2024-01-26 RX ORDER — LANOLIN ALCOHOL/MO/W.PET/CERES
3 CREAM (GRAM) TOPICAL AT BEDTIME
Refills: 0 | Status: DISCONTINUED | OUTPATIENT
Start: 2024-01-26 | End: 2024-01-29

## 2024-01-26 RX ORDER — METOPROLOL SUCCINATE 50 MG/1
50 TABLET, EXTENDED RELEASE ORAL DAILY
Qty: 1 | Refills: 1 | Status: DISCONTINUED | COMMUNITY
End: 2024-01-26

## 2024-01-26 RX ORDER — SENNA PLUS 8.6 MG/1
17.2 TABLET ORAL
Qty: 0 | Refills: 0 | DISCHARGE

## 2024-01-26 RX ORDER — ASPIRIN/CALCIUM CARB/MAGNESIUM 324 MG
81 TABLET ORAL DAILY
Refills: 0 | Status: DISCONTINUED | OUTPATIENT
Start: 2024-01-26 | End: 2024-01-29

## 2024-01-26 RX ORDER — DOCUSATE SODIUM 100 MG
0 CAPSULE ORAL
Qty: 0 | Refills: 0 | DISCHARGE

## 2024-01-26 RX ADMIN — ZOLPIDEM TARTRATE 5 MILLIGRAM(S): 10 TABLET ORAL at 22:47

## 2024-01-26 RX ADMIN — ATORVASTATIN CALCIUM 10 MILLIGRAM(S): 80 TABLET, FILM COATED ORAL at 21:28

## 2024-01-26 RX ADMIN — AMLODIPINE BESYLATE 10 MILLIGRAM(S): 2.5 TABLET ORAL at 21:27

## 2024-01-26 NOTE — H&P ADULT - NSHPPHYSICALEXAM_GEN_ALL_CORE
ICU Vital Signs Last 24 Hrs  T(C): 36.5 (26 Jan 2024 19:25), Max: 36.8 (26 Jan 2024 13:10)  T(F): 97.7 (26 Jan 2024 19:25), Max: 98.2 (26 Jan 2024 13:10)  HR: 57 (26 Jan 2024 19:25) (55 - 57)  BP: 130/75 (26 Jan 2024 19:25) (130/75 - 151/78)  BP(mean): 91 (26 Jan 2024 19:25) (91 - 97)    RR: 18 (26 Jan 2024 19:25) (18 - 19)  SpO2: 97% (26 Jan 2024 19:25) (96% - 97%)    O2 Parameters below as of 26 Jan 2024 19:25  Patient On (Oxygen Delivery Method): room air

## 2024-01-26 NOTE — H&P ADULT - CONVERSATION DETAILS
Pt states he is Full Code and would like all measures of resuscitation including  CPR, intubation, IVF, antibiotics,  hemodialysis trial, and feeding tube trial.   Pt states his HCP is his wife, Frank Euceda.

## 2024-01-26 NOTE — ED PROVIDER NOTE - CARE PLAN
1 Principal Discharge DX:	Frequent PVCs   Principal Discharge DX:	Frequent PVCs  Secondary Diagnosis:	Near syncope

## 2024-01-26 NOTE — H&P ADULT - NSHPSOCIALHISTORY_GEN_ALL_CORE
Pt retired from being a Urology Surgeon at age 77.  He lives with his wife.  He bikes 20mi many days/week.  No tob/ETOH/drug use.

## 2024-01-26 NOTE — CONSULT NOTE ADULT - PROBLEM SELECTOR RECOMMENDATION 9
pt presenting with worsening palpitations and presyncope  ECG and tele with frequent PVCs despite increased metoprolol   appreciate EP recs; initiation of sotalol with QTc monitoring  will plan for nuc stress test Monday. Keep NPO Sun night after midnight   will order limited echo to assess function (normal 9/2023)   Maintain K>4, Mg>2    Outpatient cardiologist: [place] Dr. Palla

## 2024-01-26 NOTE — DISCUSSION/SUMMARY
[FreeTextEntry1] : Patient with above hx   Increasing palpitations : symptomatic PVCS with bradycardia , will discontinue metoprolol start on acebutelol 200 mg po BID , will increase as he tolerate , will obtain blood work ,  zio patch 3 days  will obtain exercise nuclear stress test ,    s/p ascending aorta , arch  reimplantation of coronaries , BLESSING occlusion with palpitation , recurrent atrial fibrillation ,  s/p cardioversion   now in marked sinus bradycardia  , continue ecotrin   stable size on follow up  CT   HTN .hypertensive heart disease  controlled ,continue current medication  low salt diet   Hyperlipidemia  continue statin    asthma : bullous lung disease stable ,  controlled  without inhaler   follow up after 1 week   [EKG obtained to assist in diagnosis and management of assessed problem(s)] : EKG obtained to assist in diagnosis and management of assessed problem(s)

## 2024-01-26 NOTE — CARDIOLOGY SUMMARY
[de-identified] : 8/25/23  sinus bradycardia  low QRS voltage  [de-identified] : 2/10/22  Mild LVH EF 64% Normal Bio AVR hx of ascending ,arch of aorta repair  ( no significant change )  [de-identified] : 9/16/21   3.5 cm  no change  [de-identified] : 9/13/18 Non obstructive disease   [de-identified] : 9/13/18  aortic arch , aortic root , valve replaced , closed BLESSING  [de-identified] : jan 2021 Minimal carotid atherosclerosis

## 2024-01-26 NOTE — H&P ADULT - TIME BILLING
I spent a total of 85 minutes on the date of this encounter coordinating the patient's care. This includes reviewing documentation pertinent to this admission, results and imaging in addition to completing a history and physical examination on the patient. Further tests, medications, and procedures have been ordered as indicated. Laboratory results and the plan of care were communicated to the patient and their family member. Supporting documentation was completed and added to the patient's chart.

## 2024-01-26 NOTE — HISTORY OF PRESENT ILLNESS
[FreeTextEntry1] : patient with history of PAF HTN s/p GIORGIO cardioversion  twice in 2011 , 2015  , 2018, asthma    Patient  had surgery for AAA , aortic root, valve , arch replacement,  closed BLESSING ,9/13/18 , with post op recurrence afib ,  Patient was cardioverted sinus rhythm   came with complain that he is feeling  frequents palpitations more than usual for last few days while on vacation associated with sinking feeling , without chest pain or shortness of breath , denies any associated dizziness ,  he has increased BB dose  taking 100 mg daily instead of 50 mg ,   Patient denies any chest pain or shortness of breath or dizziness   occasional isolated skipped heart beat , patient  phone recording reviewed , appears to be symptomatic PVC , does take extra dose of metoprolol  ,   Patient had   CT angio of chest  as patient AVR with root replacement ,ascending and arch  showed stable changes  done  in august 2023    blood work July 2023  normal  LDL 67

## 2024-01-26 NOTE — CONSULT NOTE ADULT - SUBJECTIVE AND OBJECTIVE BOX
HPI:  84 year old male with patient with history of PAF, HTN s/p GIORGIO cardioversion twice in 2011 , 2015 , 2018, asthma. Patient had surgery for AAA , aortic root, valve , arch replacement, closed BLESSING ,9/13/18 , with post op recurrence afib. Patient presented to cardiologist today with report of feeling frequent palpitations more than usual for last few days while on vacation associated with sinking feeling , without chest pain or shortness of breath , denies any associated dizziness.  He had increased BB dose taking 100 mg daily instead of 50 mg.  Cardiology prescribed Acebutolol and patient went home wearing monitor, planned for stress test.  Once at home, patient experienced near syncope and was advised to come in ED.  In ED patient noted to have frequent PVCs, bigeminy.  EP asked to evaluate for frequent PVC.  ?      PAST MEDICAL & SURGICAL HISTORY:  AF (atrial fibrillation)  HTN (hypertension)  BPH (Benign Prostatic Hyperplasia)  HLD (hyperlipidemia)  Thoracic aortic aneurysm without rupture  Sepsis due to Escherichia coli  UTI (urinary tract infection)  S/p tibial fracture  Atrial fibrillation status post cardioversion      Allergies    No Known Allergies      SOCIAL HISTORY: Denies tobacco, etoh abuse or illicit drug use    FAMILY HISTORY:  No pertinent family history in first degree relatives        Vital Signs Last 24 Hrs  T(C): 36.3 (26 Jan 2024 15:48), Max: 36.8 (26 Jan 2024 13:10)  T(F): 97.4 (26 Jan 2024 15:48), Max: 98.2 (26 Jan 2024 13:10)  HR: 56 (26 Jan 2024 15:48) (55 - 56)  BP: 151/78 (26 Jan 2024 15:48) (145/74 - 151/78)  BP(mean): 97 (26 Jan 2024 15:48) (96 - 97)  RR: 19 (26 Jan 2024 15:48) (18 - 19)  SpO2: 96% (26 Jan 2024 15:48) (96% - 97%)    Parameters below as of 26 Jan 2024 15:48  Patient On (Oxygen Delivery Method): room air        REVIEW OF SYSTEMS:    CONSTITUTIONAL:  As per HPI.  HEENT:  Eyes:  No diplopia or blurred vision. ENT:  No earache, sore throat or runny nose.  CARDIOVASCULAR:  No pressure, squeezing, strangling, tightness, heaviness or aching about the chest, neck, axilla or epigastrium.  RESPIRATORY:  No cough, shortness of breath, PND or orthopnea.  GASTROINTESTINAL:  No nausea, vomiting or diarrhea.  GENITOURINARY:  No dysuria, frequency or urgency.  MUSCULOSKELETAL:  As per HPI.  SKIN:  No change in skin, hair or nails.  NEUROLOGIC:  No paresthesias, fasciculations, seizures or weakness.  PSYCHIATRIC:  No disorder of thought or mood.  ENDOCRINE:  No heat or cold intolerance, polyuria or polydipsia.  HEMATOLOGICAL:  No easy bruising or bleedings:  .     PHYSICAL EXAMINATION:    GENERAL APPEARANCE:  Pt. is not currently dyspneic, in no distress. Pt. is alert, oriented, and pleasant.  HEENT:  Pupils are normal and react normally. No icterus. Mucous membranes well colored.  NECK:  Supple. No lymphadenopathy. Jugular venous pressure not elevated. Carotids equal.   HEART:   The cardiac impulse has a normal quality. There are no murmurs, rubs or gallops noted  CHEST:  Chest is clear to auscultation. Normal respiratory effort.  ABDOMEN:  Soft and nontender.   EXTREMITIES:  There is no edema.   SKIN:  No rash or significant lesions are noted.    I&O's Summary      LABS:                        15.4   4.17  )-----------( 219      ( 26 Jan 2024 14:07 )             45.0     01-26    139  |  109<H>  |  20  ----------------------------<  116<H>  4.1   |  30  |  1.07    Ca    9.0      26 Jan 2024 14:07  Mg     2.4     01-26    TPro  7.7  /  Alb  3.9  /  TBili  0.8  /  DBili  x   /  AST  19  /  ALT  37  /  AlkPhos  80  01-26    LIVER FUNCTIONS - ( 26 Jan 2024 14:07 )  Alb: 3.9 g/dL / Pro: 7.7 gm/dL / ALK PHOS: 80 U/L / ALT: 37 U/L / AST: 19 U/L / GGT: x           PT/INR - ( 26 Jan 2024 14:07 )   PT: 10.6 sec;   INR: 0.94 ratio         PTT - ( 26 Jan 2024 14:07 )  PTT:31.2 sec      Urinalysis Basic - ( 26 Jan 2024 14:07 )    Color: x / Appearance: x / SG: x / pH: x  Gluc: 116 mg/dL / Ketone: x  / Bili: x / Urobili: x   Blood: x / Protein: x / Nitrite: x   Leuk Esterase: x / RBC: x / WBC x   Sq Epi: x / Non Sq Epi: x / Bacteria: x      EKG: SB with occasional PVC@55bpm  CA: 158ms  QRS: 80ms  QT/QTc: 464/443ms    TELEMETRY: SB 50-60, frequent PVC, bigeminy    CARDIAC TESTS:  Outpatient echo 9/2023:  CONCLUSIONS:    1. Left ventricular systolic function is normal with an ejection fraction visually estimated at 60 to 65 %.  2. There is mild (grade 1) left ventricular diastolic dysfunction.  3. Right ventricular cavity is normal in size and normal systolic function.  4. Trace mitral regurgitation.  5. A bioprosthetic valve replacement present in the aortic position. No intravalvular regurgitation No paravalvular regurgitation. Hx of aortic valve , ascending , arch of aorta replaced.  6. Mild tricuspid regurgitation.  7. Mild pulmonic regurgitation.  8. Echogenic graft material noted in the proximal ascending aorta.      RADIOLOGY & ADDITIONAL STUDIES:     HPI:  84 year old male with patient with history of PAF, HTN s/p GIORGIO cardioversion twice in 2011 , 2015 , 2018, asthma. Patient had surgery for AAA , aortic root valve and arch replacement, BLESSING excision on 9/13/18 , with post op recurrence afib. Patient presented to cardiologist today with report of feeling frequent palpitations more than usual for last few days while on vacation associated with sinking feeling , without chest pain or shortness of breath , denies any associated dizziness.  He had increased BB dose taking 100 mg daily instead of 50 mg.  Cardiology prescribed Acebutolol and patient went home wearing monitor, planned for stress test.  Once at home, patient experienced near syncope and was advised to come in ED.  In ED patient noted to have frequent PVCs, bigeminy.  EP asked to evaluate for frequent PVC.  ?      PAST MEDICAL & SURGICAL HISTORY:  AF (atrial fibrillation)  HTN (hypertension)  BPH (Benign Prostatic Hyperplasia)  HLD (hyperlipidemia)  Thoracic aortic aneurysm without rupture  Sepsis due to Escherichia coli  UTI (urinary tract infection)  S/p tibial fracture  Atrial fibrillation status post cardioversion      Allergies    No Known Allergies      SOCIAL HISTORY: Denies tobacco, etoh abuse or illicit drug use    FAMILY HISTORY:  No pertinent family history in first degree relatives        Vital Signs Last 24 Hrs  T(C): 36.3 (26 Jan 2024 15:48), Max: 36.8 (26 Jan 2024 13:10)  T(F): 97.4 (26 Jan 2024 15:48), Max: 98.2 (26 Jan 2024 13:10)  HR: 56 (26 Jan 2024 15:48) (55 - 56)  BP: 151/78 (26 Jan 2024 15:48) (145/74 - 151/78)  BP(mean): 97 (26 Jan 2024 15:48) (96 - 97)  RR: 19 (26 Jan 2024 15:48) (18 - 19)  SpO2: 96% (26 Jan 2024 15:48) (96% - 97%)    Parameters below as of 26 Jan 2024 15:48  Patient On (Oxygen Delivery Method): room air        REVIEW OF SYSTEMS:    CONSTITUTIONAL:  As per HPI.  HEENT:  Eyes:  No diplopia or blurred vision. ENT:  No earache, sore throat or runny nose.  CARDIOVASCULAR:  No pressure, squeezing, strangling, tightness, heaviness or aching about the chest, neck, axilla or epigastrium.  RESPIRATORY:  No cough, shortness of breath, PND or orthopnea.  GASTROINTESTINAL:  No nausea, vomiting or diarrhea.  GENITOURINARY:  No dysuria, frequency or urgency.  MUSCULOSKELETAL:  As per HPI.  SKIN:  No change in skin, hair or nails.  NEUROLOGIC:  No paresthesias, fasciculations, seizures or weakness.  PSYCHIATRIC:  No disorder of thought or mood.  ENDOCRINE:  No heat or cold intolerance, polyuria or polydipsia.  HEMATOLOGICAL:  No easy bruising or bleedings:  .     PHYSICAL EXAMINATION:    GENERAL APPEARANCE:  Pt. is not currently dyspneic, in no distress. Pt. is alert, oriented, and pleasant.  HEENT:  Pupils are normal and react normally. No icterus. Mucous membranes well colored.  NECK:  Supple. No lymphadenopathy. Jugular venous pressure not elevated. Carotids equal.   HEART:   The cardiac impulse has a normal quality. There are no murmurs, rubs or gallops noted  CHEST:  Chest is clear to auscultation. Normal respiratory effort.  ABDOMEN:  Soft and nontender.   EXTREMITIES:  There is no edema.   SKIN:  No rash or significant lesions are noted.    I&O's Summary      LABS:                        15.4   4.17  )-----------( 219      ( 26 Jan 2024 14:07 )             45.0     01-26    139  |  109<H>  |  20  ----------------------------<  116<H>  4.1   |  30  |  1.07    Ca    9.0      26 Jan 2024 14:07  Mg     2.4     01-26    TPro  7.7  /  Alb  3.9  /  TBili  0.8  /  DBili  x   /  AST  19  /  ALT  37  /  AlkPhos  80  01-26    LIVER FUNCTIONS - ( 26 Jan 2024 14:07 )  Alb: 3.9 g/dL / Pro: 7.7 gm/dL / ALK PHOS: 80 U/L / ALT: 37 U/L / AST: 19 U/L / GGT: x           PT/INR - ( 26 Jan 2024 14:07 )   PT: 10.6 sec;   INR: 0.94 ratio         PTT - ( 26 Jan 2024 14:07 )  PTT:31.2 sec      Urinalysis Basic - ( 26 Jan 2024 14:07 )    Color: x / Appearance: x / SG: x / pH: x  Gluc: 116 mg/dL / Ketone: x  / Bili: x / Urobili: x   Blood: x / Protein: x / Nitrite: x   Leuk Esterase: x / RBC: x / WBC x   Sq Epi: x / Non Sq Epi: x / Bacteria: x      EKG: SB with occasional PVC@55bpm  TN: 158ms  QRS: 80ms  QT/QTc: 464/443ms    TELEMETRY: SB 50-60, frequent PVC, bigeminy    CARDIAC TESTS:  Outpatient echo 9/2023:  CONCLUSIONS:    1. Left ventricular systolic function is normal with an ejection fraction visually estimated at 60 to 65 %.  2. There is mild (grade 1) left ventricular diastolic dysfunction.  3. Right ventricular cavity is normal in size and normal systolic function.  4. Trace mitral regurgitation.  5. A bioprosthetic valve replacement present in the aortic position. No intravalvular regurgitation No paravalvular regurgitation. Hx of aortic valve , ascending , arch of aorta replaced.  6. Mild tricuspid regurgitation.  7. Mild pulmonic regurgitation.  8. Echogenic graft material noted in the proximal ascending aorta.      RADIOLOGY & ADDITIONAL STUDIES:

## 2024-01-26 NOTE — ED ADULT TRIAGE NOTE - CHIEF COMPLAINT QUOTE
Patient presents to the ER with complaints of near syncope and pale at MD Palla's office, dizziness and palpitations. Patient stated EKG was done and showed PVC's. Patient with significant cardiac history. Heart rate 58 in triage, patient reports "it's always in the 50's." Patient with holter monitor.

## 2024-01-26 NOTE — REASON FOR VISIT
[Symptom and Test Evaluation] : symptom and test evaluation [Structural Heart and Valve Disease] : structural heart and valve disease [Arrhythmia/ECG Abnorrmalities] : arrhythmia/ECG abnormalities [Hyperlipidemia] : hyperlipidemia [Hypertension] : hypertension [Coronary Artery Disease] : coronary artery disease

## 2024-01-26 NOTE — ED ADULT NURSE NOTE - OBJECTIVE STATEMENT
Patient presents to the ER with complaints of near syncope and pale at MD Palla's office, dizziness and palpitations. Patient stated EKG was done and showed PVC's. Patient with significant cardiac history. Heart rate 58 in triage, patient reports "it's always in the 50's." Patient with holter monitor. Pt treatment room now, cardiac monitor on. Pt with hx of afib, multiple cardioversions, & aortic aneurysm. Pt reports that he has had "periods of PVC's that usually stops but this time it hasn't stopped and it almost made me faint".

## 2024-01-26 NOTE — CONSULT NOTE ADULT - NS ATTEND AMEND GEN_ALL_CORE FT
agree with a/p as above  pt with presyncope likely related to frequent PVC not improving with metoprolol   pt has good functional status otherwise and nml LVEF  will start sotalol monitor ecg for QTs, tele monitoring   ischemic work up  will follow        I spent a total of 55 minutes on the encounter, including chart review, evaluating and discussing treatment options with the patient, as well as counseling and coordination as stated above.

## 2024-01-26 NOTE — ED ADULT NURSE REASSESSMENT NOTE - NS ED NURSE REASSESS COMMENT FT1
Received handoff from ALBA Hinojosa. Pt sitting in stretcher in room, eating dinner. Hospitalist at bedside discussing POC with pt. Pt admitted and awaiting bed on the floor. Pt has no complaints at this time. Vital signs stable. No acute distress noted at this time. Comfort and safety measures maintained.

## 2024-01-26 NOTE — PATIENT PROFILE ADULT - VISION (WITH CORRECTIVE LENSES IF THE PATIENT USUALLY WEARS THEM):
1pair/Partially impaired: cannot see medication labels or newsprint, but can see obstacles in path, and the surrounding layout; can count fingers at arm's length

## 2024-01-26 NOTE — PATIENT PROFILE ADULT - FALL HARM RISK - HARM RISK INTERVENTIONS
Assistance OOB with selected safe patient handling equipment/Communicate Risk of Fall with Harm to all staff/Monitor for mental status changes/Monitor gait and stability/Reinforce activity limits and safety measures with patient and family/Sit up slowly, dangle for a short time, stand at bedside before walking/Tailored Fall Risk Interventions/Toileting schedule using arm’s reach rule for commode and bathroom/Use of alarms - bed, chair and/or voice tab/Visual Cue: Yellow wristband and red socks/Bed in lowest position, wheels locked, appropriate side rails in place/Call bell, personal items and telephone in reach/Instruct patient to call for assistance before getting out of bed or chair/Non-slip footwear when patient is out of bed/[place] to call system/Physically safe environment - no spills, clutter or unnecessary equipment/Purposeful Proactive Rounding/Room/bathroom lighting operational, light cord in reach

## 2024-01-26 NOTE — CONSULT NOTE ADULT - ASSESSMENT
ASSESSMENT & PLAN: 84 year old male with above history presenting with symptomatic PVCs and frequent bigeminy.      Admit to tele medicine  Patient would benefit from AAD therapy for PVC suppression  Discussed initiating Sotalol with patient with verbalized understanding  Patient is agreeable to admission for medication  Discontinue Metoprolol  Will start Sotalol 80mg PO q12h tomorrow morning  Patient will need EKG two hours after each dose of Sotalol for first 6 doses to monitor QTc  If QTc >500ms hold dose and contact EP for dose adjustment  Avoid other QT prolonging medications while on Sotalol  Check BMP and Mag daily  Keep K>4, Mag >2  Will start Magnesium 400mg PO daily  Patient will need ischemia eval prior to discharge, cardiology to plan for stress test on Monday 1/29  Further management per medicine  Plan discussed with patient, wife, ED team and Dr. Adams

## 2024-01-26 NOTE — H&P ADULT - HISTORY OF PRESENT ILLNESS
84 year old male with patient with history of PAF, HTN s/p GIORGIO cardioversion twice in 2011 , 2015 , 2018, asthma. Patient had surgery for AAA , aortic root valve and arch replacement, BLESSING excision on 9/13/18 , with post op recurrence afib. Patient presented to cardiologist today with report of feeling frequent palpitations more than usual for last few days while on vacation associated with sinking feeling , without chest pain or shortness of breath , denies any associated dizziness.  He had increased BB dose taking 100 mg daily instead of 50 mg.  Cardiology prescribed Acebutolol and patient went home wearing monitor, planned for stress test.  Once at home, patient experienced near syncope and was advised to come in ED. 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 Patient had surgery for AAA , aortic root valve and arch replacement, BLESSING excision on 9/13/18 , with post op recurrence afib,  and open Wachmen procedure, atrial appendage clip,  Bioprosthetic AVR 2019. Patient presented to Dr. Palla's office (cardiologist) today with report of feeling frequent palpitations more than usual for the last few days while on vacation associated with a sinking feeling ,  He had increased BB dose taking 100 mg daily instead of 50 mg.  Cardiology prescribed Acebutolol and patient went home wearing a monitor, planned for stress test.  Patient was at home this afternoon and  experienced palpitations and near syncope after lunch.    He denies  chest pain or shortness of breath .  He did bike 20 mi three days ago without cpain/SOB.   Pt denies any fevers, no cough, no diarrhea, no abdominal pain, no urinary or resp complaints. No injuries.

## 2024-01-26 NOTE — ED STATDOCS - PROGRESS NOTE DETAILS
Narda Ramirez for attending Dr. Leonardo: 85 y/o male with a PMHx of Afib, BPH, HLD, HTN, sepsis due to Ecoli, thoracic aortic aneurysm without rupture, UTI presents to the ED sent by Dr. Palla for evaluation. Pt was pale and had a near syncopal episode in office, had EKG showing PVCs and was sent to the ED for evaluation. Pt has a Holter monitor. No other complaints at this time. Cardio: Dr. Palla. Will send pt to main ED for further evaluation.

## 2024-01-26 NOTE — H&P ADULT - ASSESSMENT
Pt is admitted w    PVCs DDX ACS  Palpitations  Presyncope  Hx of Afib s/p open Wachman procedure, on ASA   Hx of AVR 2019 bioprosthetic valve  Hx of HTN  Hx of HLD    - adm to tele  - apprec EP consult  - repeat labs  - d/c metoprolol  - Sotalol loading tomorrow,  follow EKGs  - cont statin, asa 81mg  - For thalium stress test on Monday  - Keep NPO Sun night after midnight   - DVT proph ambulation  - Full Code

## 2024-01-26 NOTE — PHYSICAL EXAM
[Well Nourished] : well nourished [Well Developed] : well developed [No Acute Distress] : no acute distress [Normal Conjunctiva] : normal conjunctiva [Normal Venous Pressure] : normal venous pressure [No Carotid Bruit] : no carotid bruit [Normal S1, S2] : normal S1, S2 [No Rub] : no rub [No Gallop] : no gallop [Clear Lung Fields] : clear lung fields [Murmur] : murmur [Good Air Entry] : good air entry [No Respiratory Distress] : no respiratory distress  [Soft] : abdomen soft [No Masses/organomegaly] : no masses/organomegaly [Non Tender] : non-tender [Normal Bowel Sounds] : normal bowel sounds [Normal Gait] : normal gait [No Clubbing] : no clubbing [No Cyanosis] : no cyanosis [No Edema] : no edema [No Rash] : no rash [No Varicosities] : no varicosities [No Skin Lesions] : no skin lesions [Moves all extremities] : moves all extremities [No Focal Deficits] : no focal deficits [Normal Speech] : normal speech [Alert and Oriented] : alert and oriented [Normal memory] : normal memory [de-identified] : 2/6 SM

## 2024-01-26 NOTE — REVIEW OF SYSTEMS
[Palpitations] : palpitations [Negative] : Heme/Lymph [SOB] : no shortness of breath [Chest Discomfort] : no chest discomfort [Orthopnea] : no orthopnea [Cough] : no cough [Abdominal Pain] : no abdominal pain [Change in Appetite] : no change in appetite

## 2024-01-26 NOTE — PATIENT PROFILE ADULT - FALL HARM RISK - PATIENT NEEDS ASSISTANCE
Implemented All Fall with Harm Risk Interventions:  [place] to call system. Call bell, personal items and telephone within reach. Instruct patient to call for assistance. Room bathroom lighting operational. Non-slip footwear when patient is off stretcher. Physically safe environment: no spills, clutter or unnecessary equipment. Stretcher in lowest position, wheels locked, appropriate side rails in place. Provide visual cue, wrist band, yellow gown, etc. Monitor gait and stability. Monitor for mental status changes and reorient to person, place, and time. Review medications for side effects contributing to fall risk. Reinforce activity limits and safety measures with patient and family. Provide visual clues: red socks.
Walking

## 2024-01-26 NOTE — H&P ADULT - NSICDXFAMILYHX_GEN_ALL_CORE_FT
FAMILY HISTORY:  Father  Still living? No  Paternal family history of hypertension, Age at diagnosis: Age Unknown

## 2024-01-26 NOTE — ED PROVIDER NOTE - CLINICAL SUMMARY MEDICAL DECISION MAKING FREE TEXT BOX
adult male comes in with 10 days of palpatations and then a near syncope today. vss. bradycaric but on BBs. EKG sinus ellyn no heart block. BP normal. exam unremarkable. EP consulted. pt says he doenst want to stay over [place].

## 2024-01-27 LAB
25(OH)D3 SERPL-MCNC: 44.1 NG/ML
ALBUMIN SERPL ELPH-MCNC: 4.4 G/DL
ALP BLD-CCNC: 77 U/L
ALT SERPL-CCNC: 26 U/L
ANION GAP SERPL CALC-SCNC: 8 MMOL/L
AST SERPL-CCNC: 21 U/L
BASOPHILS # BLD AUTO: 0.04 K/UL
BASOPHILS NFR BLD AUTO: 0.8 %
BILIRUB SERPL-MCNC: 0.7 MG/DL
BUN SERPL-MCNC: 18 MG/DL — SIGNIFICANT CHANGE UP (ref 7–23)
BUN SERPL-MCNC: 22 MG/DL
CALCIUM SERPL-MCNC: 8.7 MG/DL — SIGNIFICANT CHANGE UP (ref 8.5–10.1)
CALCIUM SERPL-MCNC: 9.3 MG/DL
CHLORIDE SERPL-SCNC: 103 MMOL/L
CHLORIDE SERPL-SCNC: 111 MMOL/L — HIGH (ref 96–108)
CHOLEST SERPL-MCNC: 151 MG/DL — SIGNIFICANT CHANGE UP
CHOLEST SERPL-MCNC: 154 MG/DL
CO2 SERPL-SCNC: 28 MMOL/L
CO2 SERPL-SCNC: 30 MMOL/L — SIGNIFICANT CHANGE UP (ref 22–31)
CREAT SERPL-MCNC: 1.02 MG/DL
CREAT SERPL-MCNC: 1.06 MG/DL — SIGNIFICANT CHANGE UP (ref 0.5–1.3)
EGFR: 70 ML/MIN/1.73M2 — SIGNIFICANT CHANGE UP
EGFR: 73 ML/MIN/1.73M2
EOSINOPHIL # BLD AUTO: 0.1 K/UL
EOSINOPHIL NFR BLD AUTO: 2.1 %
ESTIMATED AVERAGE GLUCOSE: 111 MG/DL
GLUCOSE SERPL-MCNC: 105 MG/DL — HIGH (ref 70–99)
GLUCOSE SERPL-MCNC: 82 MG/DL
HBA1C MFR BLD HPLC: 5.5 %
HCT VFR BLD CALC: 43.4 %
HDLC SERPL-MCNC: 56 MG/DL — SIGNIFICANT CHANGE UP
HDLC SERPL-MCNC: 63 MG/DL
HGB BLD-MCNC: 14.1 G/DL
IMM GRANULOCYTES NFR BLD AUTO: 0.2 %
LDLC SERPL CALC-MCNC: 81 MG/DL
LIPID PNL WITH DIRECT LDL SERPL: 82 MG/DL — SIGNIFICANT CHANGE UP
LYMPHOCYTES # BLD AUTO: 1.4 K/UL
LYMPHOCYTES NFR BLD AUTO: 29.6 %
MAGNESIUM SERPL-MCNC: 2.3 MG/DL
MAGNESIUM SERPL-MCNC: 2.3 MG/DL — SIGNIFICANT CHANGE UP (ref 1.6–2.6)
MAN DIFF?: NORMAL
MCHC RBC-ENTMCNC: 29.6 PG
MCHC RBC-ENTMCNC: 32.5 GM/DL
MCV RBC AUTO: 91 FL
MONOCYTES # BLD AUTO: 0.5 K/UL
MONOCYTES NFR BLD AUTO: 10.6 %
NEUTROPHILS # BLD AUTO: 2.68 K/UL
NEUTROPHILS NFR BLD AUTO: 56.7 %
NON HDL CHOLESTEROL: 95 MG/DL — SIGNIFICANT CHANGE UP
NONHDLC SERPL-MCNC: 91 MG/DL
PLATELET # BLD AUTO: 219 K/UL
POTASSIUM SERPL-MCNC: 4.9 MMOL/L — SIGNIFICANT CHANGE UP (ref 3.5–5.3)
POTASSIUM SERPL-SCNC: 4.8 MMOL/L
POTASSIUM SERPL-SCNC: 4.9 MMOL/L — SIGNIFICANT CHANGE UP (ref 3.5–5.3)
PROT SERPL-MCNC: 6.8 G/DL
RBC # BLD: 4.77 M/UL
RBC # FLD: 13.4 %
SODIUM SERPL-SCNC: 140 MMOL/L
SODIUM SERPL-SCNC: 141 MMOL/L — SIGNIFICANT CHANGE UP (ref 135–145)
TRIGL SERPL-MCNC: 48 MG/DL
TRIGL SERPL-MCNC: 64 MG/DL — SIGNIFICANT CHANGE UP
TROPONIN I, HIGH SENSITIVITY RESULT: 8.16 NG/L — SIGNIFICANT CHANGE UP
TSH SERPL-ACNC: 2.65 UIU/ML
WBC # FLD AUTO: 4.73 K/UL

## 2024-01-27 PROCEDURE — 99233 SBSQ HOSP IP/OBS HIGH 50: CPT

## 2024-01-27 PROCEDURE — 99232 SBSQ HOSP IP/OBS MODERATE 35: CPT

## 2024-01-27 PROCEDURE — 93308 TTE F-UP OR LMTD: CPT | Mod: 26

## 2024-01-27 PROCEDURE — 93010 ELECTROCARDIOGRAM REPORT: CPT | Mod: 76

## 2024-01-27 RX ORDER — SENNA PLUS 8.6 MG/1
2 TABLET ORAL AT BEDTIME
Refills: 0 | Status: DISCONTINUED | OUTPATIENT
Start: 2024-01-27 | End: 2024-01-29

## 2024-01-27 RX ADMIN — Medication 80 MILLIGRAM(S): at 09:34

## 2024-01-27 RX ADMIN — ATORVASTATIN CALCIUM 10 MILLIGRAM(S): 80 TABLET, FILM COATED ORAL at 21:27

## 2024-01-27 RX ADMIN — AMLODIPINE BESYLATE 10 MILLIGRAM(S): 2.5 TABLET ORAL at 21:27

## 2024-01-27 RX ADMIN — Medication 81 MILLIGRAM(S): at 09:34

## 2024-01-27 RX ADMIN — MAGNESIUM OXIDE 400 MG ORAL TABLET 400 MILLIGRAM(S): 241.3 TABLET ORAL at 09:40

## 2024-01-27 RX ADMIN — FAMOTIDINE 20 MILLIGRAM(S): 10 INJECTION INTRAVENOUS at 09:34

## 2024-01-27 RX ADMIN — Medication 80 MILLIGRAM(S): at 20:22

## 2024-01-27 RX ADMIN — ZOLPIDEM TARTRATE 5 MILLIGRAM(S): 10 TABLET ORAL at 21:27

## 2024-01-27 NOTE — PROGRESS NOTE ADULT - SUBJECTIVE AND OBJECTIVE BOX
: no cp/sob  palpitations on and off  cont sotalol  NM stress test Monday      PHYSICAL EXAM:    Daily     Daily Weight in k.3 (2024 20:57)    Vital Signs Last 24 Hrs  T(C): 36.4 (2024 20:57), Max: 36.5 (2024 19:25)  T(F): 97.6 (2024 20:57), Max: 97.7 (2024 19:25)  HR: 57 (2024 09:30) (51 - 57)  BP: 128/68 (2024 09:30) (128/68 - 151/78)  BP(mean): 91 (2024 19:25) (91 - 97)  RR: 20 (2024 09:30) (16 - 20)  SpO2: 99% (2024 09:30) (96% - 99%)    Constitutional: Weak and ill appearing  HEENT: Atraumatic, NADE[place],   Respiratory: Breath Sounds normal, no rhonchi/wheeze  Cardiovascular: N S1S2;   Gastrointestinal: Abdomen soft, non tender, Bowel Sounds present  Extremities: No edema, peripheral pulses present  Neurological: AAO x 3, no gross focal motor deficits  Skin: Non cellulitic, no rash, ulcers  Lymph Nodes: No lymphadenopathy noted  Back: No CVA tenderness   Musculoskeletal: non tender  Breasts: Deferred  Genitourinary: deferred  Rectal: Deferred    All Labs/EKG/Radiology/Meds reviewed by me                          15.4   4.17  )-----------( 219      ( 2024 14:07 )             45.0       CBC Full  -  ( 2024 14:07 )  WBC Count : 4.17 K/uL  RBC Count : 4.99 M/uL  Hemoglobin : 15.4 g/dL  Hematocrit : 45.0 %  Platelet Count - Automated : 219 K/uL  Mean Cell Volume : 90.2 fl  Mean Cell Hemoglobin : 30.9 pg  Mean Cell Hemoglobin Concentration : 34.2 gm/dL  Auto Neutrophil # : 2.48 K/uL  Auto Lymphocyte # : 1.19 K/uL  Auto Monocyte # : 0.38 K/uL  Auto Eosinophil # : 0.08 K/uL  Auto Basophil # : 0.03 K/uL  Auto Neutrophil % : 59.6 %  Auto Lymphocyte % : 28.5 %  Auto Monocyte % : 9.1 %  Auto Eosinophil % : 1.9 %  Auto Basophil % : 0.7 %          141  |  111<H>  |  18  ----------------------------<  105<H>  4.9   |  30  |  1.06    Ca    8.7      2024 07:33  Mg     2.3         TPro  7.7  /  Alb  3.9  /  TBili  0.8  /  DBili  x   /  AST  19  /  ALT  37  /  AlkPhos  80        LIVER FUNCTIONS - ( 2024 14:07 )  Alb: 3.9 g/dL / Pro: 7.7 gm/dL / ALK PHOS: 80 U/L / ALT: 37 U/L / AST: 19 U/L / GGT: x             PT/INR - ( 2024 14:07 )   PT: 10.6 sec;   INR: 0.94 ratio         PTT - ( 2024 14:07 )  PTT:31.2 sec          Urinalysis Basic - ( 2024 07:33 )    Color: x / Appearance: x / SG: x / pH: x  Gluc: 105 mg/dL / Ketone: x  / Bili: x / Urobili: x   Blood: x / Protein: x / Nitrite: x   Leuk Esterase: x / RBC: x / WBC x   Sq Epi: x / Non Sq Epi: x / Bacteria: x            MEDICATIONS  (STANDING):  amLODIPine   Tablet 10 milliGRAM(s) Oral daily  aspirin enteric coated 81 milliGRAM(s) Oral daily  atorvastatin 10 milliGRAM(s) Oral at bedtime  famotidine    Tablet 20 milliGRAM(s) Oral daily  losartan 100 milliGRAM(s) Oral daily  magnesium oxide 400 milliGRAM(s) Oral daily  sotalol. 80 milliGRAM(s) Oral every 12 hours    MEDICATIONS  (PRN):  melatonin 3 milliGRAM(s) Oral at bedtime PRN Insomnia  senna 2 Tablet(s) Oral at bedtime PRN Constipation  zolpidem 5 milliGRAM(s) Oral at bedtime PRN Insomnia

## 2024-01-27 NOTE — PROVIDER CONTACT NOTE (OTHER) - ASSESSMENT
Upon assessment patient asymptomatic with no complaints of pain or discomfort at this time Vitals 124/58 HR 47 O2 98% RA Temp 98.0 F

## 2024-01-27 NOTE — PROGRESS NOTE ADULT - SUBJECTIVE AND OBJECTIVE BOX
ELECTROPHYSIOLOGY   PROGRESS NOTE      Reason for follow up: Sotolol loading/ PVCs/ Bigeminy  Overnight: Pt continues to have frequent PVCs Bigeminy + palpitations  Update: Sotolol loading initiated this am 80 mg po Q12h - CrCL-66.9 Denies any new complaints Will continue to monitor HR and QTc ectopy     Subjective: "  ______I'm ok________________"      	  Vitals:  T(C): 36.4 (01-26-24 @ 20:57), Max: 36.8 (01-26-24 @ 13:10)  HR: 57 (01-27-24 @ 09:30) (51 - 57)  BP: 128/68 (01-27-24 @ 09:30) (128/68 - 151/78)  RR: 20 (01-27-24 @ 09:30) (16 - 20)  SpO2: 99% (01-27-24 @ 09:30) (96% - 99%)  Wt(kg): --  I&O's Summary    Weight (kg): 73.3 (01-26 @ 20:57)      PHYSICAL EXAM:  Appearance: Comfortable. No acute distress  HEENT:  Head and neck: Atraumatic. Normocephalic.  Normal oral mucosa, PERRL, Neck is supple. No JVD, No carotid bruit.   Neurologic: A & O x 3, no focal deficits. EOMI.  Lymphatic: No cervical lymphadenopathy  Cardiovascular: Normal S1 S2, No murmur, rubs/gallops. No JVD, No edema  Respiratory: Lungs clear to auscultation  Gastrointestinal:  Soft, Non-tender, + BS  Lower Extremities: No edema  Psychiatry: Patient is calm. No agitation. Mood & affect appropriate  Skin: No rashes/ ecchymoses/cyanosis/ulcers visualized on the face, hands or feet.      CURRENT MEDICATIONS:  amLODIPine   Tablet 10 milliGRAM(s) Oral daily  losartan 100 milliGRAM(s) Oral daily  sotalol. 80 milliGRAM(s) Oral every 12 hours    famotidine    Tablet  atorvastatin  aspirin enteric coated  magnesium oxide  	      LABS:	 	                            15.4   4.17  )-----------( 219      ( 26 Jan 2024 14:07 )             45.0     01-27    141  |  111<H>  |  18  ----------------------------<  105<H>  4.9   |  30  |  1.06    Ca    8.7      27 Jan 2024 07:33  Mg     2.3     01-27    TPro  7.7  /  Alb  3.9  /  TBili  0.8  /  DBili  x   /  AST  19  /  ALT  37  /  AlkPhos  80  01-26      TELEMETRY: Reviewed  NSR 60s with bigeminy, PVCs triplets frequently + palps  ECG:  Reviewed by me. 	  Ventricular Rate 68 BPM    Atrial Rate 68 BPM    P-R Interval 164 ms    QRS Duration 82 ms    Q-T Interval 458 ms    QTC Calculation(Bezet) 487 ms    P Axis 52 degrees    R Axis 72 degrees    T Axis 76 degrees    Diagnosis Line Normal sinus rhythm  Low voltage QRS  ST & T wave abnormality, consider anterior ischemia  Prolonged QT  Abnormal ECG  When compared with ECG of 25-SEP-2018 18:17,  Sinus rhythm has replaced Atrial fibrillation  Nonspecific T wave abnormality no longer evident in Inferior leads      Echo 1/22/2018     Impression     Summary     Mild aortic sclerosis is present with normal valvular opening.   Mild (1+) aortic regurgitation is present.   The left atrium is mildly dilated. No left atrial thrombus in appendage   The left ventricle is normal in size, wall thickness, wall motion and   contractility.   Moderate dilatation of the ascending aortic segment.   The mitral valve leaflets appear thickened.   Mild (1+) mitral regurgitation is present.   No evidence of pericardial effusion.   Normal appearing pulmonic valve structure and function.   Normal appearing right atrium.   Normal appearing right ventricle structure and function.   Normal appearing tricuspid valve structure and function.   Trace tricuspid valve regurgitation is present.

## 2024-01-27 NOTE — PROGRESS NOTE ADULT - SUBJECTIVE AND OBJECTIVE BOX
CHIEF COMPLAINT: palpitations    HPI:  83 yo male with a PAF s/p GIORGIO/DCCV x2 in 2011, 2015, 2018, hypertension, asthma, thoracic aortic aneurysm s/p surgical repair and bioprosthetic AVR and watchman in 2018 referred by outpatient cardiologist (Dr. Palla) to ED for palpitations and presyncope. Pt reports worsening sx over the past week and significant lightheadedness today. Denies chest pain or SOB. He has no hx coronary disease - ischemic evaluation/cath in 2018 unremarkable. He exercises regularly without limiting sx.      ECG noted to have frequent PVCs.   1/27/24: patient seen comfortable, palpitation still present intermittently awaiting to start Sotalol, aware of stress test Monday       MEDICATIONS  (STANDING):  amLODIPine   Tablet 10 milliGRAM(s) Oral daily  aspirin enteric coated 81 milliGRAM(s) Oral daily  atorvastatin 10 milliGRAM(s) Oral at bedtime  famotidine    Tablet 20 milliGRAM(s) Oral daily  losartan 100 milliGRAM(s) Oral daily  magnesium oxide 400 milliGRAM(s) Oral daily  sotalol. 80 milliGRAM(s) Oral every 12 hours    MEDICATIONS  (PRN):  melatonin 3 milliGRAM(s) Oral at bedtime PRN Insomnia  senna 2 Tablet(s) Oral at bedtime PRN Constipation  zolpidem 5 milliGRAM(s) Oral at bedtime PRN Insomnia    VITAL SIGNS: Vital Signs Last 24 Hrs  T(C): 36.4 (26 Jan 2024 20:57), Max: 36.8 (26 Jan 2024 13:10)  T(F): 97.6 (26 Jan 2024 20:57), Max: 98.2 (26 Jan 2024 13:10)  HR: 51 (26 Jan 2024 20:57) (51 - 57)  BP: 139/64 (26 Jan 2024 20:57) (130/75 - 151/78)  BP(mean): 91 (26 Jan 2024 19:25) (91 - 97)  RR: 16 (26 Jan 2024 20:57) (16 - 19)  SpO2: 99% (26 Jan 2024 20:57) (96% - 99%)    Parameters below as of 26 Jan 2024 20:57  Patient On (Oxygen Delivery Method): room air        I&O's Summary      PHYSICAL EXAM:  Constitutional: NAD, awake and alert  HEENT:  EOMI,  Pupils round, No oral cyanosis.  Pulmonary: Non-labored, breath sounds are clear bilaterally, No wheezing, rales or rhonchi  Cardiovascular: irregular, no murmur   Gastrointestinal: Bowel Sounds present, soft, nontender.   Lymph: No peripheral edema. No cervical lymphadenopathy.  Neurological: Alert, no focal deficits  Skin: No rashes.  Psych:  Mood & affect appropriate    LABS:  LABS: All Labs Reviewed:                        15.4   4.17  )-----------( 219      ( 26 Jan 2024 14:07 )             45.0     01-27    141  |  111<H>  |  18  ----------------------------<  105<H>  4.9   |  30  |  1.06    Ca    8.7      27 Jan 2024 07:33  Mg     2.3     01-27    TPro  7.7  /  Alb  3.9  /  TBili  0.8  /  DBili  x   /  AST  19  /  ALT  37  /  AlkPhos  80  01-26    PT/INR - ( 26 Jan 2024 14:07 )   PT: 10.6 sec;   INR: 0.94 ratio         PTT - ( 26 Jan 2024 14:07 )  PTT:31.2 sec  - TroponinI hsT: <-8.16, <-11.40      Urinalysis Basic - ( 27 Jan 2024 07:33 )    Color: x / Appearance: x / SG: x / pH: x  Gluc: 105 mg/dL / Ketone: x  / Bili: x / Urobili: x   Blood: x / Protein: x / Nitrite: x   Leuk Esterase: x / RBC: x / WBC x   Sq Epi: x / Non Sq Epi: x / Bacteria: x                              15.4   4.17  )-----------( 219      ( 26 Jan 2024 14:07 )             45.0     26 Jan 2024 14:07    139    |  109    |  20     ----------------------------<  116    4.1     |  30     |  1.07     Ca    9.0        26 Jan 2024 14:07  Mg     2.4       26 Jan 2024 14:07    TPro  7.7    /  Alb  3.9    /  TBili  0.8    /  DBili  x      /  AST  19     /  ALT  37     /  AlkPhos  80     26 Jan 2024 14:07    PT/INR - ( 26 Jan 2024 14:07 )   PT: 10.6 sec;   INR: 0.94 ratio         PTT - ( 26 Jan 2024 14:07 )  PTT:31.2 sec    -----------  ECG noted to have frequent PVCs.     TELE: 1/27/24: SB 50s bigem with PVC       REASON FOR VISIT: palpitations    HPI:  83 yo male with a PAF s/p GIORGIO/DCCV x2 in 2011, 2015, 2018, hypertension, asthma, thoracic aortic aneurysm s/p surgical repair and bioprosthetic AVR and watchman in 2018 referred by outpatient cardiologist (Dr. Palla) to ED for palpitations and presyncope. Pt reports worsening sx over the past week and significant lightheadedness today. Denies chest pain or SOB. He has no hx coronary disease - ischemic evaluation/cath in 2018 unremarkable. He exercises regularly without limiting sx.      ECG noted to have frequent PVCs.     1/27/24: patient seen comfortable, palpitation still present intermittently awaiting to start Sotalol, aware of stress test Monday     MEDICATIONS  (STANDING):  amLODIPine   Tablet 10 milliGRAM(s) Oral daily  aspirin enteric coated 81 milliGRAM(s) Oral daily  atorvastatin 10 milliGRAM(s) Oral at bedtime  famotidine    Tablet 20 milliGRAM(s) Oral daily  losartan 100 milliGRAM(s) Oral daily  magnesium oxide 400 milliGRAM(s) Oral daily  sotalol. 80 milliGRAM(s) Oral every 12 hours    MEDICATIONS  (PRN):  melatonin 3 milliGRAM(s) Oral at bedtime PRN Insomnia  senna 2 Tablet(s) Oral at bedtime PRN Constipation  zolpidem 5 milliGRAM(s) Oral at bedtime PRN Insomnia    Vital Signs Last 24 Hrs  T(C): 36.4 (26 Jan 2024 20:57), Max: 36.8 (26 Jan 2024 13:10)  T(F): 97.6 (26 Jan 2024 20:57), Max: 98.2 (26 Jan 2024 13:10)  HR: 57 (27 Jan 2024 09:30) (51 - 57)  BP: 128/68 (27 Jan 2024 09:30) (128/68 - 151/78)  BP(mean): 91 (26 Jan 2024 19:25) (91 - 97)  RR: 20 (27 Jan 2024 09:30) (16 - 20)  SpO2: 99% (27 Jan 2024 09:30) (96% - 99%)  Patient On (Oxygen Delivery Method): room air    PHYSICAL EXAM:  Constitutional: NAD, awake and alert  HEENT:  EOMI,  Pupils round, No oral cyanosis.  Pulmonary: Non-labored, breath sounds are clear bilaterally, No wheezing, rales or rhonchi  Cardiovascular: irregular, no murmur   Gastrointestinal: Bowel Sounds present, soft, nontender.   Lymph: No peripheral edema. No cervical lymphadenopathy.  Neurological: Alert, no focal deficits  Skin: No rashes.  Psych:  Mood & affect appropriate    LABS:                      15.4   4.17  )-----------( 219      ( 26 Jan 2024 14:07 )             45.0       141  |  111<H>  |  18  ----------------------------<  105<H>  4.9   |  30  |  1.06    Ca    8.7      27 Jan 2024 07:33  Mg     2.3     01-27    TPro  7.7  /  Alb  3.9  /  TBili  0.8  /  DBili  x   /  AST  19  /  ALT  37  /  AlkPhos  80  01-26    PT/INR - ( 26 Jan 2024 14:07 )   PT: 10.6 sec;   INR: 0.94 ratio    PTT - ( 26 Jan 2024 14:07 )  PTT:31.2 sec    TroponinI hsT: <-8.16, <-11.40

## 2024-01-28 LAB
BUN SERPL-MCNC: 18 MG/DL — SIGNIFICANT CHANGE UP (ref 7–23)
CALCIUM SERPL-MCNC: 8.6 MG/DL — SIGNIFICANT CHANGE UP (ref 8.5–10.1)
CHLORIDE SERPL-SCNC: 111 MMOL/L — HIGH (ref 96–108)
CO2 SERPL-SCNC: 31 MMOL/L — SIGNIFICANT CHANGE UP (ref 22–31)
CREAT SERPL-MCNC: 1.14 MG/DL — SIGNIFICANT CHANGE UP (ref 0.5–1.3)
EGFR: 64 ML/MIN/1.73M2 — SIGNIFICANT CHANGE UP
GLUCOSE SERPL-MCNC: 103 MG/DL — HIGH (ref 70–99)
MAGNESIUM SERPL-MCNC: 2.4 MG/DL — SIGNIFICANT CHANGE UP (ref 1.6–2.6)
POTASSIUM SERPL-MCNC: 4.6 MMOL/L — SIGNIFICANT CHANGE UP (ref 3.5–5.3)
POTASSIUM SERPL-SCNC: 4.6 MMOL/L — SIGNIFICANT CHANGE UP (ref 3.5–5.3)
SODIUM SERPL-SCNC: 140 MMOL/L — SIGNIFICANT CHANGE UP (ref 135–145)

## 2024-01-28 PROCEDURE — 93010 ELECTROCARDIOGRAM REPORT: CPT

## 2024-01-28 PROCEDURE — 99233 SBSQ HOSP IP/OBS HIGH 50: CPT

## 2024-01-28 PROCEDURE — 99232 SBSQ HOSP IP/OBS MODERATE 35: CPT

## 2024-01-28 PROCEDURE — 93010 ELECTROCARDIOGRAM REPORT: CPT | Mod: 76

## 2024-01-28 RX ADMIN — AMLODIPINE BESYLATE 10 MILLIGRAM(S): 2.5 TABLET ORAL at 21:24

## 2024-01-28 RX ADMIN — Medication 81 MILLIGRAM(S): at 08:57

## 2024-01-28 RX ADMIN — FAMOTIDINE 20 MILLIGRAM(S): 10 INJECTION INTRAVENOUS at 08:56

## 2024-01-28 RX ADMIN — Medication 80 MILLIGRAM(S): at 08:56

## 2024-01-28 RX ADMIN — MAGNESIUM OXIDE 400 MG ORAL TABLET 400 MILLIGRAM(S): 241.3 TABLET ORAL at 08:56

## 2024-01-28 RX ADMIN — ATORVASTATIN CALCIUM 10 MILLIGRAM(S): 80 TABLET, FILM COATED ORAL at 21:25

## 2024-01-28 RX ADMIN — Medication 80 MILLIGRAM(S): at 20:12

## 2024-01-28 RX ADMIN — ZOLPIDEM TARTRATE 5 MILLIGRAM(S): 10 TABLET ORAL at 21:25

## 2024-01-28 NOTE — PROGRESS NOTE ADULT - SUBJECTIVE AND OBJECTIVE BOX
REASON FOR VISIT: Patient is a 84y old  Male who presents with a chief complaint of PVCs  Palpitations  Presyncope  2 sec pause (28 Jan 2024 10:27)      HPI:  83 yo male with a PAF s/p GIORGIO/DCCV x2 in 2011, 2015, 2018, hypertension, asthma, thoracic aortic aneurysm s/p surgical repair and bioprosthetic AVR and watchman in 2018 referred by outpatient cardiologist (Dr. Palla) to ED for palpitations and presyncope. Pt reports worsening sx over the past week and significant lightheadedness today. Denies chest pain or SOB. He has no hx coronary disease - ischemic evaluation/cath in 2018 unremarkable. He exercises regularly without limiting sx.      ECG noted to have frequent PVCs.     1/27/24: patient seen comfortable, palpitation still present intermittently awaiting to start Sotalol, aware of stress test Monday 1/28/24 pt OOB, has been placed on isolation precaution due to exposure to Covid yesterday. Pt has no complaints today, reviewed plan for 1/29/24, pt is very agreeable. Continues to have frequent bigeminy. Sotalol began 1/27/24 pt is on dose # 3.      Tele: SB 50's with frequent bigeminy     MEDICATIONS  (STANDING):  amLODIPine   Tablet 10 milliGRAM(s) Oral daily  aspirin enteric coated 81 milliGRAM(s) Oral daily  atorvastatin 10 milliGRAM(s) Oral at bedtime  famotidine    Tablet 20 milliGRAM(s) Oral daily  losartan 100 milliGRAM(s) Oral daily  magnesium oxide 400 milliGRAM(s) Oral daily  sotalol. 80 milliGRAM(s) Oral every 12 hours    MEDICATIONS  (PRN):  melatonin 3 milliGRAM(s) Oral at bedtime PRN Insomnia  senna 2 Tablet(s) Oral at bedtime PRN Constipation  zolpidem 5 milliGRAM(s) Oral at bedtime PRN Insomnia      Allergies    No Known Allergies    Intolerances    REVIEW OF SYSTEMS:  CONSTITUTIONAL: No weakness, fevers or chills  EYES/ENT: No visual changes;  No vertigo or throat pain   NECK: No pain or stiffness  RESPIRATORY: No cough, wheezing, hemoptysis; No shortness of breath  CARDIOVASCULAR: No chest pain or palpitations  GASTROINTESTINAL: No abdominal or epigastric pain. No nausea, vomiting, or hematemesis; No diarrhea or constipation. No melena or hematochezia.  GENITOURINARY: No dysuria, frequency or hematuria  NEUROLOGICAL: No numbness or weakness  SKIN: No itching, burning, rashes, or lesions   All other review of systems is negative unless indicated above    Vital Signs Last 24 Hrs  T(C): 36.3 (28 Jan 2024 08:39), Max: 36.4 (27 Jan 2024 20:52)  T(F): 97.3 (28 Jan 2024 08:39), Max: 97.6 (27 Jan 2024 20:52)  HR: 56 (28 Jan 2024 08:39) (55 - 56)  BP: 124/66 (28 Jan 2024 08:39) (124/66 - 151/86)  BP(mean): --  RR: 18 (28 Jan 2024 08:39) (18 - 19)  SpO2: 94% (28 Jan 2024 08:39) (94% - 99%)    Parameters below as of 28 Jan 2024 08:39  Patient On (Oxygen Delivery Method): room air      PHYSICAL EXAM:  Constitutional: NAD, awake and alert, well-developed  Eyes:  EOMI,  Pupils round, No oral cyanosis.  Pulmonary: Non-labored, breath sounds are clear bilaterally, No wheezing, rales or rhonchi  Cardiovascular: S1 and S2, regular rate and rhythm, no Murmurs, gallops or rubs  Gastrointestinal: Bowel Sounds present, soft, nontender.   Lymph: No peripheral edema. No cervical lymphadenopathy.  Neurological: Alert, no focal deficits  Skin: No rashes.  Psych:  Mood & affect appropriate    LABS: All Labs Reviewed:                        15.4   4.17  )-----------( 219      ( 26 Jan 2024 14:07 )             45.0     28 Jan 2024 07:28    140    |  111    |  18     ----------------------------<  103    4.6     |  31     |  1.14   27 Jan 2024 07:33    141    |  111    |  18     ----------------------------<  105    4.9     |  30     |  1.06   26 Jan 2024 14:07    139    |  109    |  20     ----------------------------<  116    4.1     |  30     |  1.07     Ca    8.6        28 Jan 2024 07:28  Ca    8.7        27 Jan 2024 07:33  Ca    9.0        26 Jan 2024 14:07  Mg     2.4       28 Jan 2024 07:28  Mg     2.3       27 Jan 2024 07:33  Mg     2.4       26 Jan 2024 14:07    TPro  7.7    /  Alb  3.9    /  TBili  0.8    /  DBili  x      /  AST  19     /  ALT  37     /  AlkPhos  80     26 Jan 2024 14:07    PT/INR - ( 26 Jan 2024 14:07 )   PT: 10.6 sec;   INR: 0.94 ratio    PTT - ( 26 Jan 2024 14:07 )  PTT:31.2 sec  TroponinI hsT: <-8.16, <-11.40    RADIOLOGY/EKG:    < from: 12 Lead ECG (01.27.24 @ 11:43) >  Ventricular Rate 47 BPM    Atrial Rate 47 BPM    P-R Interval 182 ms    QRS Duration 78 ms    Q-T Interval 486 ms    QTC Calculation(Bazett) 430 ms    P Axis 49 degrees    R Axis 62 degrees    T Axis 70 degrees    Diagnosis Line Sinus bradycardia  Otherwise normal ECG  When compared with ECG of 27-JAN-2024 07:12,  No significant change was found  Confirmed by Mickie Mojica ([id number]) on 1/27/2024 12:31:36 PM    < end of copied text >

## 2024-01-28 NOTE — PROGRESS NOTE ADULT - SUBJECTIVE AND OBJECTIVE BOX
ELECTROPHYSIOLOGY  PROGRESS NOTE      Reason for follow up: PVCs /Sotalol loading  Overnight: No new events. Pt's PVC/ bigeminal burden sl improved-he feels palpitations are reduced  Update: Plan for stress test in am. Continue Sotalol @ 80 mg po Q12h dose #4 tonight QTc 460, HR 49    Subjective: "  __I feel palpitations are better____________________"    General: No fatigue, no fevers/chills  Respiratory: No dyspnea, no cough, no wheeze  CV: No chest pain, no palpitations  Abd: No nausea  Neuro: No headache, no dizziness    	  Vitals:  T(C): 36.3 (01-28-24 @ 08:39), Max: 36.4 (01-27-24 @ 20:52)  HR: 56 (01-28-24 @ 08:39) (55 - 56)  BP: 124/66 (01-28-24 @ 08:39) (124/66 - 151/86)  RR: 18 (01-28-24 @ 08:39) (18 - 19)  SpO2: 94% (01-28-24 @ 08:39) (94% - 99%)  Wt(kg): --  I&O's Summary    Weight (kg): 73.3 (01-26 @ 20:57)      PHYSICAL EXAM:  Appearance: Comfortable. No acute distress  HEENT:  Head and neck: Atraumatic. Normocephalic.  Normal oral mucosa, PERRL, Neck is supple. No JVD, No carotid bruit.   Neurologic: A & O x 3, no focal deficits. EOMI.  Lymphatic: No cervical lymphadenopathy  Cardiovascular: Normal S1 S2, No murmur, rubs/gallops. No JVD, No edema  Respiratory: Lungs clear to auscultation  Gastrointestinal:  Soft, Non-tender, + BS  Lower Extremities: No edema  Psychiatry: Patient is calm. No agitation. Mood & affect appropriate  Skin: No rashes/ ecchymoses/cyanosis/ulcers visualized on the face, hands or feet.      CURRENT MEDICATIONS:  amLODIPine   Tablet 10 milliGRAM(s) Oral daily  losartan 100 milliGRAM(s) Oral daily  sotalol. 80 milliGRAM(s) Oral every 12 hours    famotidine    Tablet  atorvastatin  aspirin enteric coated  magnesium oxide        LABS:	 	                            15.4   4.17  )-----------( 219      ( 26 Jan 2024 14:07 )             45.0     01-28    140  |  111<H>  |  18  ----------------------------<  103<H>  4.6   |  31  |  1.14    Ca    8.6      28 Jan 2024 07:28  Mg     2.4     01-28    TPro  7.7  /  Alb  3.9  /  TBili  0.8  /  DBili  x   /  AST  19  /  ALT  37  /  AlkPhos  80  01-26    proBNP:   Lipid Profile: Date: 01-27 @ 07:33  Total cholesterol 151; Direct LDL: --; HDL: 56; Triglycerides:64          TELEMETRY: Reviewed  Sinus ellyn with HR 40-60s Bigeminal episodes less frequent  ECG:  Reviewed by me. 	QTc this am 460 ms     Ventricular Rate 47 BPM    Atrial Rate 47 BPM    P-R Interval 182 ms    QRS Duration 78 ms    Q-T Interval 486 ms    QTC Calculation(Bazett) 430 ms    P Axis 49 degrees    R Axis 62 degrees    T Axis 70 degrees    Diagnosis Line Sinus bradycardia  Otherwise normal ECG  When compared with ECG of 27-JAN-2024 07:12,  No significant change was found  Confirmed by Mickie Mojica ([id number]) on 1/27/2024 12:31:36 PM

## 2024-01-28 NOTE — PROGRESS NOTE ADULT - SUBJECTIVE AND OBJECTIVE BOX
: no cp/sob  palpitations on and off  cont sotalol  NM stress test : says that he is having decreased palpitations  NM stress test tomorrow    PHYSICAL EXAM:    Daily     Daily Weight in k.3 (2024 20:57)    Vital Signs Last 24 Hrs  T(C): 36.3 (2024 08:39), Max: 36.4 (2024 20:52)  T(F): 97.3 (2024 08:39), Max: 97.6 (2024 20:52)  HR: 56 (2024 08:39) (55 - 56)  BP: 124/66 (2024 08:39) (124/66 - 151/86)  BP(mean): --  RR: 18 (2024 08:39) (18 - 19)  SpO2: 94% (2024 08:39) (94% - 99%)    Parameters below as of 2024 08:39  Patient On (Oxygen Delivery Method): room air    Constitutional: Well appearing  HEENT: Atraumatic, NADEEM,   Respiratory: Breath Sounds normal, no rhonchi/wheeze  Cardiovascular: N S1S2;   Gastrointestinal: Abdomen soft, non tender, Bowel Sounds present  Extremities: No edema, peripheral pulses present  Neurological: AAO x 3, no gross focal motor deficits  Skin: Non cellulitic, no rash, ulcers  Lymph Nodes: No lymphadenopathy noted  Back: No CVA tenderness   Musculoskeletal: non tender  Breasts: Deferred  Genitourinary: deferred  Rectal: Deferred    All Labs/EKG/Radiology/Meds reviewed by me        140  |  111<H>  |  18  ----------------------------<  103<H>  4.6   |  31  |  1.14    Ca    8.6      2024 07:28  Mg     2.4         TPro  7.7  /  Alb  3.9  /  TBili  0.8  /  DBili  x   /  AST  19  /  ALT  37  /  AlkPhos  80                            15.4   4.17  )-----------( 219      ( 2024 14:07 )             45.0       CBC Full  -  ( 2024 14:07 )  WBC Count : 4.17 K/uL  RBC Count : 4.99 M/uL  Hemoglobin : 15.4 g/dL  Hematocrit : 45.0 %  Platelet Count - Automated : 219 K/uL  Mean Cell Volume : 90.2 fl  Mean Cell Hemoglobin : 30.9 pg  Mean Cell Hemoglobin Concentration : 34.2 gm/dL  Auto Neutrophil # : 2.48 K/uL  Auto Lymphocyte # : 1.19 K/uL  Auto Monocyte # : 0.38 K/uL  Auto Eosinophil # : 0.08 K/uL  Auto Basophil # : 0.03 K/uL  Auto Neutrophil % : 59.6 %  Auto Lymphocyte % : 28.5 %  Auto Monocyte % : 9.1 %  Auto Eosinophil % : 1.9 %  Auto Basophil % : 0.7 %          141  |  111<H>  |  18  ----------------------------<  105<H>  4.9   |  30  |  1.06    Ca    8.7      2024 07:33  Mg     2.3         TPro  7.7  /  Alb  3.9  /  TBili  0.8  /  DBili  x   /  AST  19  /  ALT  37  /  AlkPhos  80        LIVER FUNCTIONS - ( 2024 14:07 )  Alb: 3.9 g/dL / Pro: 7.7 gm/dL / ALK PHOS: 80 U/L / ALT: 37 U/L / AST: 19 U/L / GGT: x             PT/INR - ( 2024 14:07 )   PT: 10.6 sec;   INR: 0.94 ratio         PTT - ( 2024 14:07 )  PTT:31.2 sec          Urinalysis Basic - ( 2024 07:33 )    Color: x / Appearance: x / SG: x / pH: x  Gluc: 105 mg/dL / Ketone: x  / Bili: x / Urobili: x   Blood: x / Protein: x / Nitrite: x   Leuk Esterase: x / RBC: x / WBC x   Sq Epi: x / Non Sq Epi: x / Bacteria: x            MEDICATIONS  (STANDING):  amLODIPine   Tablet 10 milliGRAM(s) Oral daily  aspirin enteric coated 81 milliGRAM(s) Oral daily  atorvastatin 10 milliGRAM(s) Oral at bedtime  famotidine    Tablet 20 milliGRAM(s) Oral daily  losartan 100 milliGRAM(s) Oral daily  magnesium oxide 400 milliGRAM(s) Oral daily  sotalol. 80 milliGRAM(s) Oral every 12 hours    MEDICATIONS  (PRN):  melatonin 3 milliGRAM(s) Oral at bedtime PRN Insomnia  senna 2 Tablet(s) Oral at bedtime PRN Constipation  zolpidem 5 milliGRAM(s) Oral at bedtime PRN Insomnia

## 2024-01-29 ENCOUNTER — TRANSCRIPTION ENCOUNTER (OUTPATIENT)
Age: 84
End: 2024-01-29

## 2024-01-29 VITALS
SYSTOLIC BLOOD PRESSURE: 115 MMHG | RESPIRATION RATE: 18 BRPM | DIASTOLIC BLOOD PRESSURE: 75 MMHG | OXYGEN SATURATION: 96 % | TEMPERATURE: 98 F | HEART RATE: 48 BPM

## 2024-01-29 LAB
ANION GAP SERPL CALC-SCNC: 5 MMOL/L — SIGNIFICANT CHANGE UP (ref 5–17)
BUN SERPL-MCNC: 20 MG/DL — SIGNIFICANT CHANGE UP (ref 7–23)
CALCIUM SERPL-MCNC: 8.5 MG/DL — SIGNIFICANT CHANGE UP (ref 8.5–10.1)
CHLORIDE SERPL-SCNC: 107 MMOL/L — SIGNIFICANT CHANGE UP (ref 96–108)
CO2 SERPL-SCNC: 26 MMOL/L — SIGNIFICANT CHANGE UP (ref 22–31)
CREAT SERPL-MCNC: 1.02 MG/DL — SIGNIFICANT CHANGE UP (ref 0.5–1.3)
EGFR: 73 ML/MIN/1.73M2 — SIGNIFICANT CHANGE UP
GLUCOSE SERPL-MCNC: 97 MG/DL — SIGNIFICANT CHANGE UP (ref 70–99)
MAGNESIUM SERPL-MCNC: 2.4 MG/DL — SIGNIFICANT CHANGE UP (ref 1.6–2.6)
POTASSIUM SERPL-MCNC: 4.2 MMOL/L — SIGNIFICANT CHANGE UP (ref 3.5–5.3)
POTASSIUM SERPL-SCNC: 4.2 MMOL/L — SIGNIFICANT CHANGE UP (ref 3.5–5.3)
SARS-COV-2 RNA SPEC QL NAA+PROBE: SIGNIFICANT CHANGE UP
SODIUM SERPL-SCNC: 138 MMOL/L — SIGNIFICANT CHANGE UP (ref 135–145)

## 2024-01-29 PROCEDURE — 99232 SBSQ HOSP IP/OBS MODERATE 35: CPT

## 2024-01-29 PROCEDURE — 93018 CV STRESS TEST I&R ONLY: CPT

## 2024-01-29 PROCEDURE — 93016 CV STRESS TEST SUPVJ ONLY: CPT

## 2024-01-29 PROCEDURE — 78452 HT MUSCLE IMAGE SPECT MULT: CPT | Mod: 26

## 2024-01-29 PROCEDURE — 99233 SBSQ HOSP IP/OBS HIGH 50: CPT

## 2024-01-29 PROCEDURE — 99239 HOSP IP/OBS DSCHRG MGMT >30: CPT

## 2024-01-29 PROCEDURE — 93010 ELECTROCARDIOGRAM REPORT: CPT

## 2024-01-29 RX ORDER — SOTALOL HCL 120 MG
40 TABLET ORAL EVERY 24 HOURS
Refills: 0 | Status: DISCONTINUED | OUTPATIENT
Start: 2024-01-29 | End: 2024-01-29

## 2024-01-29 RX ORDER — MAGNESIUM OXIDE 400 MG ORAL TABLET 241.3 MG
1 TABLET ORAL
Qty: 90 | Refills: 1
Start: 2024-01-29 | End: 2024-07-26

## 2024-01-29 RX ORDER — METOPROLOL TARTRATE 50 MG
1 TABLET ORAL
Refills: 0 | DISCHARGE

## 2024-01-29 RX ORDER — SOTALOL HCL 120 MG
40 TABLET ORAL EVERY 12 HOURS
Refills: 0 | Status: DISCONTINUED | OUTPATIENT
Start: 2024-01-29 | End: 2024-01-29

## 2024-01-29 RX ORDER — REGADENOSON 0.08 MG/ML
0.4 INJECTION, SOLUTION INTRAVENOUS ONCE
Refills: 0 | Status: COMPLETED | OUTPATIENT
Start: 2024-01-29 | End: 2024-01-29

## 2024-01-29 RX ORDER — SOTALOL HCL 120 MG
0.5 TABLET ORAL
Qty: 30 | Refills: 3
Start: 2024-01-29 | End: 2024-05-27

## 2024-01-29 RX ADMIN — MAGNESIUM OXIDE 400 MG ORAL TABLET 400 MILLIGRAM(S): 241.3 TABLET ORAL at 12:35

## 2024-01-29 RX ADMIN — Medication 81 MILLIGRAM(S): at 12:32

## 2024-01-29 RX ADMIN — REGADENOSON 0.4 MILLIGRAM(S): 0.08 INJECTION, SOLUTION INTRAVENOUS at 10:38

## 2024-01-29 RX ADMIN — Medication 80 MILLIGRAM(S): at 08:30

## 2024-01-29 RX ADMIN — FAMOTIDINE 20 MILLIGRAM(S): 10 INJECTION INTRAVENOUS at 12:33

## 2024-01-29 RX ADMIN — SENNA PLUS 2 TABLET(S): 8.6 TABLET ORAL at 12:36

## 2024-01-29 RX ADMIN — LOSARTAN POTASSIUM 100 MILLIGRAM(S): 100 TABLET, FILM COATED ORAL at 12:33

## 2024-01-29 NOTE — DISCHARGE NOTE NURSING/CASE MANAGEMENT/SOCIAL WORK - NSDCPEFALRISK_GEN_ALL_CORE
For information on Fall & Injury Prevention, visit: [email or web address] OR  [email or web address] OR  https://www.cdc.gov/steadi/patient.html

## 2024-01-29 NOTE — DISCHARGE NOTE NURSING/CASE MANAGEMENT/SOCIAL WORK - NSDCVIVACCINE_GEN_ALL_CORE_FT
influenza, injectable, quadrivalent, preservative free; 27-Sep-2018 11:15; Aditi Mckenna (RN); [employer]; [id number] (Exp. Date: 30-Jun-2019); IntraMuscular; Deltoid Left.; 0.5 milliLiter(s); VIS (VIS Published: 07-Aug-2015, VIS Presented: 27-Sep-2018);   Td (adult) preservative free; 08-May-2023 10:53; Lm Santana (RN); [employer]; [id number] (Exp. Date: 20-May-2025); IntraMuscular; Deltoid Left.; 0.5 milliLiter(s); VIS (VIS Published: 08-May-2023, VIS Presented: 08-May-2023);

## 2024-01-29 NOTE — DISCHARGE NOTE PROVIDER - PROVIDER TOKENS
PROVIDER:[TOKEN:[[id number]]],PROVIDER:[TOKEN:[[id number]]],PROVIDER:[TOKEN:[[id number]]]

## 2024-01-29 NOTE — DISCHARGE NOTE PROVIDER - HOSPITAL COURSE
PHYSICAL EXAM:    Daily     Daily     Vital Signs Last 24 Hrs  T(C): 36.6 (29 Jan 2024 08:27), Max: 36.6 (29 Jan 2024 08:27)  T(F): 97.8 (29 Jan 2024 08:27), Max: 97.8 (29 Jan 2024 08:27)  HR: 50 (29 Jan 2024 12:42) (48 - 59)  BP: 136/80 (29 Jan 2024 12:42) (119/65 - 136/80)  BP(mean): --  RR: 18 (29 Jan 2024 08:27) (18 - 18)  SpO2: 96% (29 Jan 2024 08:27) (96% - 96%)    Constitutional: Well  appearing  HEENT: Atraumatic, NADEEM, Normal, No congestion  Respiratory: Breath Sounds normal, no rhonchi/wheeze  Cardiovascular: N S1S2;   Gastrointestinal: Abdomen soft, non tender, Bowel Sounds present  Extremities: No edema, peripheral pulses present  Neurological: AAO x 3, no gross focal motor deficits  Skin: Non cellulitic, no rash, ulcers  Lymph Nodes: No lymphadenopathy noted  Back: No CVA tenderness   Musculoskeletal: non tender  Breasts: Deferred  Genitourinary: deferred  Rectal: Deferred    84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 Patient had surgery for AAA , aortic root valve and arch replacement, BLESSING excision on 9/13/18 , with post op recurrence afib,  and open Wachmen procedure, atrial appendage clip,  Bioprosthetic AVR 2019. Patient presented to Dr. Palla's office (cardiologist) today with report of feeling frequent palpitations more than usual for the last few days while on vacation associated with a sinking feeling ,  He had increased BB dose taking 100 mg daily instead of 50 mg.  Cardiology prescribed Acebutolol and patient went home wearing a monitor, planned for stress test.  Patient was at home this afternoon and  experienced palpitations and near syncope after lunch.    Pt is admitted with::    PVCs  Palpitations  Presyncope  Hx of Afib s/p open Wachman procedure, on ASA   Hx of AVR 2019 bioprosthetic valve  Hx of HTN  Hx of HLD  COVID exposure    PLAN:     - adm to tele  - apprec EP consult  - repeat labs  - d/c metoprolol  - Sotalol loading follow EKGs; d/c home on sotalol 40 mg q 12 hrs per EP   - cont statin, asa 81mg  - For NM stress test on Monday  - Keep NPO Sunday night after midnight     - Full Code      d/c home    time spent 40 min

## 2024-01-29 NOTE — CHART NOTE - NSCHARTNOTEFT_GEN_A_CORE
Sotalol has been decreased to 40 mgs bid.    He is stable for discharge and should continue with  Sotalol 40 mgs bid and Mg Oxide 400 mgs daily.    Arrangements for [facility] prior to discharge and a f/u with Dr. Adams will be made by the EP office ext [phone]

## 2024-01-29 NOTE — CHART NOTE - NSCHARTNOTEFT_GEN_A_CORE
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 Patient had surgery for AAA , aortic root valve and arch replacement, BLESSING excision on 9/13/18 , with post op recurrence afib.   Patient presented to cardiologist prior to admission  with report of feeling frequent palpitations more than usual for last few days while on vacation associated with sinking feeling , without chest pain or shortness of breath , denies any associated dizziness.  He had increased BB dose taking 100 mg daily instead of 50 mg.  Cardiology prescribed Acebutolol and patient went home wearing monitor, planned for stress test.  Once at home, patient experienced near syncope and was advised to come in ED.  In ED patient noted to have frequent PVCs, bigeminy.  EP asked to evaluate for frequent PVC. He was started on Sotalol 80 mgs bid wnich has been bradycardic.  He states that he has been bradycardic for years.    His current heart rate is 40-50  Although his PVCs are less symptotic in Sotalol will reduce the evening dose to 40 gms  and keep am dose at 80 mgs  He is scheduled for a stress test today  ? 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 Patient had surgery for AAA , aortic root valve and arch replacement, BLESSING excision on 9/13/18 , with post op recurrence afib.   Patient presented to cardiologist prior to admission  with report of feeling frequent palpitations more than usual for last few days while on vacation associated with sinking feeling , without chest pain or shortness of breath , denies any associated dizziness.  He had increased BB dose taking 100 mg daily instead of 50 mg.  Cardiology prescribed Acebutolol and patient went home wearing monitor, planned for stress test.  Once at home, patient experienced near syncope and was advised to come in ED.  In ED patient noted to have frequent PVCs, bigeminy.  EP asked to evaluate for frequent PVC. He was started on Sotalol 80 mgs bid wnich has been bradycardic.  He states that he has been bradycardic for years.  Potassium level was 4.2  and Mg 2.4.    He has completed 5/6 doses of Sotalol    His current heart rate is 40-50  Although his PVCs are less symptotic in Sotalol will reduce the evening dose to 40 gms  and keep am dose at 80 mgs  He is scheduled for a stress test today  EkG to be done this am

## 2024-01-29 NOTE — PROGRESS NOTE ADULT - PROVIDER SPECIALTY LIST ADULT
Cardiology
Hospitalist
Electrophysiology
Hospitalist
Cardiology
Electrophysiology
Electrophysiology
Cardiology

## 2024-01-29 NOTE — PROVIDER CONTACT NOTE (OTHER) - REASON
Discharge  PCP
Patient requesting ambien for sleep
Patient HR ellyn to 30s and had 1.91 second pause

## 2024-01-29 NOTE — PROGRESS NOTE ADULT - PROBLEM SELECTOR PLAN 1
pt presenting with worsening palpitations and presyncope  ECG and tele with frequent PVCs despite increased metoprolol   appreciate EP recs; initiation of sotalol with QTc monitoring  will plan for nuc stress test Monday. Keep NPO Sun night after midnight   limited echo to assess function (normal 9/2023) pending   Maintain K>4, Mg>2
pt presenting with worsening palpitations and presyncope found to have PVC's/Bigeminy  Troponin-magnesium NL,   NL LV FX on Echo EF 65%   Nuclear stress test Normal SPECT Myocardial Perfusion Imaging with no evidence of reversible or fixed perfusion defect  EP recs appreciated ( sotalol load)   EKG Sinus Bradycardia  QTc 449 ( QT improved from prior/sotalol PM sotalol dose decreased 2/2 bradycardia )   Telemetry shows less Ventricular ectopy today, patient reports less symptoms of palpitations   OPT telemetry
pt presenting with worsening palpitations and presyncope  ECG and tele with frequent PVCs despite increased metoprolol   appreciate EP recs; initiation of sotalol with QTc monitoring  Nuc stress test Monday 1/29/24 . Keep NPO Sun night after midnight   Maintain K>4, Mg>2.

## 2024-01-29 NOTE — PROGRESS NOTE ADULT - NS ATTEND AMEND GEN_ALL_CORE FT
I discussed case with MISSY Mccann (cardiology) and MISSY Hyde (EP); plan is for sotalol initiation and ischemia evaluation on 1/29 (nuclear stress).
I discussed case with MISSY Steele and Dr. Palla; management as described today; pharm nuclear stress test 1/29
agree w/ above - PVCs - stress neg, OK to d/c FU w/ EP.

## 2024-01-29 NOTE — DISCHARGE NOTE PROVIDER - NSDCFUSCHEDAPPT_GEN_ALL_CORE_FT
[facility]  CARDIOLOGY [address] [place] [address]  Scheduled Appointment: 02/15/2024    Palla, Venugopal R  [facility]  CARDIOLOGY [address] [place] [address]  Scheduled Appointment: 02/16/2024

## 2024-01-29 NOTE — PROGRESS NOTE ADULT - REASON FOR ADMISSION
PVCs  Palpitations  Presyncope  2 sec pause

## 2024-01-29 NOTE — PROGRESS NOTE ADULT - SUBJECTIVE AND OBJECTIVE BOX
REASON FOR VISIT: Patient is a 84y old  Male who presents with a chief complaint of PVCs  Palpitations  Presyncope  2 sec pause (2024 10:27)      HPI:  83 yo male with a PAF s/p GIORGIO/DCCV x2 in , , 2018, hypertension, asthma, thoracic aortic aneurysm s/p surgical repair and bioprosthetic AVR and watchman in 2018 referred by outpatient cardiologist (Dr. Palla) to ED for palpitations and presyncope. Pt reports worsening sx over the past week and significant lightheadedness today. Denies chest pain or SOB. He has no hx coronary disease - ischemic evaluation/cath in 2018 unremarkable. He exercises regularly without limiting sx.        24: patient seen comfortable, palpitation still present intermittently awaiting to start Sotalol, aware of stress test 24 pt OOB, has been placed on isolation precaution due to exposure to Covid yesterday. Pt has no complaints today, reviewed plan for 24, pt is very agreeable. Continues to have frequent bigeminy. Sotalol began 24 pt is on dose # 3.    24: Awake ambulating n room, no cardiovascular complaints, tele SR/SB 40-50's asymptomatic, EKG Sinus Bradycardia  QTc 449 ( QT improved from prior/sotalol PM sotalol dose decreased)       MEDICATIONS  (STANDING):  amLODIPine   Tablet 10 milliGRAM(s) Oral daily  aspirin enteric coated 81 milliGRAM(s) Oral daily  atorvastatin 10 milliGRAM(s) Oral at bedtime  famotidine    Tablet 20 milliGRAM(s) Oral daily  losartan 100 milliGRAM(s) Oral daily  magnesium oxide 400 milliGRAM(s) Oral daily  sotalol. 40 milliGRAM(s) Oral every 24 hours    MEDICATIONS  (PRN):  melatonin 3 milliGRAM(s) Oral at bedtime PRN Insomnia  senna 2 Tablet(s) Oral at bedtime PRN Constipation  zolpidem 5 milliGRAM(s) Oral at bedtime PRN Insomnia        Allergies    No Known Allergies    Vital Signs Last 24 Hrs  T(C): 36.6 (2024 08:27), Max: 36.6 (2024 08:27)  T(F): 97.8 (2024 08:27), Max: 97.8 (2024 08:27)  HR: 50 (2024 12:42) (48 - 59)  BP: 136/80 (2024 12:42) (119/65 - 136/80)  BP(mean): --  RR: 18 (2024 08:27) (18 - 18)  SpO2: 96% (2024 08:27) (96% - 98%)    Parameters below as of 2024 08:27  Patient On (Oxygen Delivery Method): room air          PHYSICAL EXAM:  Constitutional: NAD, awake and alert, well-developed  Eyes:  EOMI,  Pupils round, No oral cyanosis.  Pulmonary: Non-labored, breath sounds are clear bilaterally  Cardiovascular: S1 and S2, regular ( Bradycardia )  Gastrointestinal: Abd soft, nontender.   Lymph: No LE Edema  Neurological: Alert, no focal deficits  Psych:  Mood & affect appropriate    LABS: All Labs Reviewed:                        15.4   4.17  )-----------( 219      ( 2024 14:07 )             45.0     2024 07:28    140    |  111    |  18     ----------------------------<  103    4.6     |  31     |  1.14   2024 07:33    141    |  111    |  18     ----------------------------<  105    4.9     |  30     |  1.06   2024 14:07    139    |  109    |  20     ----------------------------<  116    4.1     |  30     |  1.07     Ca    8.6        2024 07:28  Ca    8.7        2024 07:33  Ca    9.0        2024 14:07  Mg     2.4       2024 07:28  Mg     2.3       2024 07:33  Mg     2.4       2024 14:07    TPro  7.7    /  Alb  3.9    /  TBili  0.8    /  DBili  x      /  AST  19     /  ALT  37     /  AlkPhos  80     2024 14:07    PT/INR - ( 2024 14:07 )   PT: 10.6 sec;   INR: 0.94 ratio    PTT - ( 2024 14:07 )  PTT:31.2 sec  TroponinI hsT: <-8.16, <-11.40    RADIOLOGY/EKG:    < from: 12 Lead ECG (24 @ 11:43) >  Ventricular Rate 47 BPM    Atrial Rate 47 BPM    P-R Interval 182 ms    QRS Duration 78 ms    Q-T Interval 486 ms    QTC Calculation(Bazett) 430 ms    P Axis 49 degrees    R Axis 62 degrees    T Axis 70 degrees    Diagnosis Line Sinus bradycardia  Otherwise normal ECG  When compared with ECG of 2024 07:12,  No significant change was found  Confirmed by Mickie Mojica ([id number]) on 2024 12:31:36 PM    Nuclear stress test [id number]  IMPRESSION: Normal SPECT Myocardial Perfusion Imagin. No evidence of reversible or fixed perfusion defects.    2. Normal left ventricular contractility with an ejection fraction of 73%   (Normal: 50% or greater). No regional wall motion abnormalities.

## 2024-01-29 NOTE — DISCHARGE NOTE NURSING/CASE MANAGEMENT/SOCIAL WORK - PATIENT PORTAL LINK FT
You can access the FollowMyHealth Patient Portal offered by [facility] by registering at the following website: [email or web address]. By joining [facility]’s FollowMyHealth portal, you will also be able to view your health information using other applications (apps) compatible with our system.

## 2024-01-29 NOTE — PROGRESS NOTE ADULT - SUBJECTIVE AND OBJECTIVE BOX
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 Patient presented to Dr. Palla's office (cardiologist) today with report of feeling frequent palpitations more than usual for the last few days while on vacation associated with a sinking feeling ,  He had increased BB dose taking 100 mg daily instead of 50 mg.  Cardiology prescribed Acebutolol and patient went home wearing a monitor, planned for stress test.  Patient was at home this afternoon and  experienced palpitations and near syncope after lunch.    He denies  chest pain or shortness of breath .  He did bike 20 mi three days ago without cpain/SOB.   Pt denies any fevers, no cough, no diarrhea, no abdominal pain, no urinary or resp complaints. No injuries. (2024 20:06)  1.29 Pt on sotalol for PVC and salvos of NSVT same morphoogy and symptomatic with dizziness. PVC well suppressed while on sotalol 80mg bid    EKG: SR 50s bpm QTc 460ms  TELE: SR occasional PVC  < from: NM Nuclear Stress Pharmacologic Multiple (24 @ 11:40) >    IMPRESSION: Normal SPECT Myocardial Perfusion Imagin. No evidence of reversible or fixed perfusion defects.    2. Normal left ventricular contractility with an ejection fraction of 73%   (Normal: 50% or greater). No regional wall motion abnormalities.    Please refer to cardiac stress test report for dosage of Regadenoson   administered, EKG findings and symptoms during the procedure.    --- End of Report ---    < end of copied text >    MEDICATIONS  (STANDING):  amLODIPine   Tablet 10 milliGRAM(s) Oral daily  aspirin enteric coated 81 milliGRAM(s) Oral daily  atorvastatin 10 milliGRAM(s) Oral at bedtime  famotidine    Tablet 20 milliGRAM(s) Oral daily  losartan 100 milliGRAM(s) Oral daily  magnesium oxide 400 milliGRAM(s) Oral daily  sotalol. 40 milliGRAM(s) Oral every 24 hours    MEDICATIONS  (PRN):  melatonin 3 milliGRAM(s) Oral at bedtime PRN Insomnia  senna 2 Tablet(s) Oral at bedtime PRN Constipation  zolpidem 5 milliGRAM(s) Oral at bedtime PRN Insomnia      Vital Signs Last 24 Hrs  T(C): 36.6 (2024 08:27), Max: 36.6 (2024 08:27)  T(F): 97.8 (2024 08:27), Max: 97.8 (2024 08:27)  HR: 50 (2024 12:42) (48 - 59)  BP: 136/80 (2024 12:42) (119/65 - 136/80)  BP(mean): --  RR: 18 (:27) (18 - 18)  SpO2: 96% (:27) (96% - 98%)    Parameters below as of :  Patient On (Oxygen Delivery Method): room air        PHYSICAL EXAMINATION:    GENERAL APPEARANCE:  Pt. is not currently dyspneic, in no distress. Pt. is alert, oriented, and pleasant.  HEENT:  Pupils are normal and react normally. No icterus. Mucous membranes well colored.  NECK:  Supple. No lymphadenopathy. Jugular venous pressure not elevated. Carotids equal.   HEART:   Regular rate and rhythm/Afib. There are no murmurs, rubs or gallops noted  CHEST:  Chest is clear to auscultation. Normal respiratory effort.  ABDOMEN:  Soft and nontender.   EXTREMITIES:  There is no edema. warm/dry.    LABS:        138  |  107  |  20  ----------------------------<  97  4.2   |  26  |  1.02    Ca    8.5      2024 07:56  Mg     2.4             Urinalysis Basic - ( 2024 07:56 )    Color: x / Appearance: x / SG: x / pH: x  Gluc: 97 mg/dL / Ketone: x  / Bili: x / Urobili: x   Blood: x / Protein: x / Nitrite: x   Leuk Esterase: x / RBC: x / WBC x   Sq Epi: x / Non Sq Epi: x / Bacteria: x            Thank you for involving our service in participating in the care of this patient.

## 2024-01-29 NOTE — PROVIDER CONTACT NOTE (OTHER) - SITUATION
Faxed discharge instructions to office. - Lashanda DE ANDA
RN notified by tele tech that patient HR ellyn down to 30s and had a 1.91 second pause

## 2024-01-29 NOTE — DISCHARGE NOTE PROVIDER - NSDCQMERRANDS_GEN_ALL_CORE
To be completed in Nursing note:    Please reference list for forms that require a visit for completion.  Please remind patients that providers are given 3-5 business days to complete and return forms.      Form type:  Home Health Care     Date form received: 19     Date form completed by Physician: 19     How was form returned to patient (mailed, faxed, or at  for patient to ): Faxed     Date form mailed/faxed/left at  for patient and sent to HIM for [email or web address]       Once form is left for patient, faxed, or mailed PCS will then close the documentation only encounter.     
No

## 2024-01-29 NOTE — PROGRESS NOTE ADULT - PROBLEM SELECTOR PROBLEM 2
AF (paroxysmal atrial fibrillation)

## 2024-01-29 NOTE — PROGRESS NOTE ADULT - ASSESSMENT
ASSESSMENT & PLAN:   84 year old male with above history presenting with symptomatic PVCs and frequent bigeminy.  Patient would benefit from AAD therapy for PVC suppression    Sotalol 80 mg po Q12h initiated yesterday am 1/27 ( pt now on dose #4 tonight)   Qtc 460ms HR -49  will monitor/ ectopy/ PVC occurrences lessened. Palpitations as per pt improved.  Patient will need EKG two hours after each dose of Sotalol for first 6 doses to monitor QTc  If QTc >500ms hold dose and contact EP for dose adjustment  Avoid other QT prolonging medications while on Sotalol  Check BMP and Mag daily  Keep K>4, Mag >2  Continue Magnesium 400mg PO daily  Patient scheduled for  ischemia eval/ plan for stress test in am 1/29  Further management per medicine  Plan discussed with patient, wife,nursing staff Dr De Jesus
  ASSESSMENT & PLAN: 84 year old male with above history presenting with symptomatic PVCs and frequent bigeminy.  Patient would benefit from AAD therapy for PVC suppression  Sotalol 80 mg po Q12h initiated this am Qtc 424ms HR -50 will monitor ectopy/ PVC occurrences  Patient is agreeable to admission for medication   Metoprolol discontinued  Patient will need EKG two hours after each dose of Sotalol for first 6 doses to monitor QTc  If QTc >500ms hold dose and contact EP for dose adjustment  Avoid other QT prolonging medications while on Sotalol  Check BMP and Mag daily  Keep K>4, Mag >2  Continue Magnesium 400mg PO daily  Patient scheduled for  ischemia eval/ plan for stress test on Monday 1/29  Further management per medicine  Plan discussed with patient, wife,nursing staff    
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 supplement  dc home with mct and f/up w/ me       I spent a total of 35 minutes on the encounter, including chart review, evaluating and discussing treatment options with the patient, as well as counseling and coordination as stated above.
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 Patient had surgery for AAA , aortic root valve and arch replacement, BLESSING excision on 9/13/18 , with post op recurrence afib,  and open Wachmen procedure, atrial appendage clip,  Bioprosthetic AVR 2019. Patient presented to Dr. Palla's office (cardiologist) today with report of feeling frequent palpitations more than usual for the last few days while on vacation associated with a sinking feeling ,  He had increased BB dose taking 100 mg daily instead of 50 mg.  Cardiology prescribed Acebutolol and patient went home wearing a monitor, planned for stress test.  Patient was at home this afternoon and  experienced palpitations and near syncope after lunch.    Pt is admitted with::    PVCs  Palpitations  Presyncope  Hx of Afib s/p open Wachman procedure, on ASA   Hx of AVR 2019 bioprosthetic valve  Hx of HTN  Hx of HLD    PLAN:     - adm to tele  - apprec EP consult  - repeat labs  - d/c metoprolol  - Sotalol loading follow EKGs  - cont statin, asa 81mg  - For NM stress test on Monday  - Keep NPO Sunday night after midnight   - DVT proph ambulation  - Full Code      POC discussed with pt, wife, team    DISPO: NPO past midnight for NM ST 1/29  continue Sotalol per EP  
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 Patient had surgery for AAA , aortic root valve and arch replacement, BLESSING excision on 9/13/18 , with post op recurrence afib,  and open Wachmen procedure, atrial appendage clip,  Bioprosthetic AVR 2019. Patient presented to Dr. Palla's office (cardiologist) today with report of feeling frequent palpitations more than usual for the last few days while on vacation associated with a sinking feeling ,  He had increased BB dose taking 100 mg daily instead of 50 mg.  Cardiology prescribed Acebutolol and patient went home wearing a monitor, planned for stress test.  Patient was at home this afternoon and  experienced palpitations and near syncope after lunch.    Pt is admitted with::    PVCs  Palpitations  Presyncope  Hx of Afib s/p open Wachman procedure, on ASA   Hx of AVR 2019 bioprosthetic valve  Hx of HTN  Hx of HLD    PLAN:     - adm to tele  - apprec EP consult  - repeat labs  - d/c metoprolol  - Sotalol loading follow EKGs  - cont statin, asa 81mg  - For thalium stress test on Monday  - Keep NPO Sun night after midnight   - DVT proph ambulation  - Full Code      POC discussed with pt, wife, team

## 2024-01-29 NOTE — PROGRESS NOTE ADULT - PROBLEM SELECTOR PLAN 2
currently SR/SB   hx of Watchman   c/w ASA.
currently in sinus.  hx of Watchman   c/w ASA
currently in sinus.  hx of Watchman   c/w ASA.

## 2024-01-29 NOTE — DISCHARGE NOTE PROVIDER - NSDCMRMEDTOKEN_GEN_ALL_CORE_FT
amLODIPine 10 mg oral tablet: 1 tab(s) orally once a day  Aspir 81 oral delayed release tablet: 1 tab(s) orally once a day   famotidine 20 mg oral tablet: 1 tab(s) orally 2 times a day  Lipitor 10 mg oral tablet: 1 tab(s) orally once a day  losartan 100 mg oral tablet: 1 tab(s) orally once a day  magnesium oxide 400 mg oral tablet: 1 tab(s) orally once a day  sotalol AF 80 mg oral tablet: 0.5 tab(s) orally every 12 hours MDD: 1 tablet

## 2024-01-29 NOTE — DISCHARGE NOTE PROVIDER - NSDCCPCAREPLAN_GEN_ALL_CORE_FT
PRINCIPAL DISCHARGE DIAGNOSIS  Diagnosis: Frequent PVCs  Assessment and Plan of Treatment: started on sotalol  f/u with Dr. Henrry HUITRON      SECONDARY DISCHARGE DIAGNOSES  Diagnosis: Near syncope  Assessment and Plan of Treatment:     Diagnosis: Exposure to COVID-19 virus  Assessment and Plan of Treatment: exposed to room mate with covid  monitor for symptoms

## 2024-01-29 NOTE — DISCHARGE NOTE PROVIDER - CARE PROVIDERS DIRECT ADDRESSES
,DirectAddress_[email or web address]

## 2024-01-29 NOTE — DISCHARGE NOTE PROVIDER - CARE PROVIDER_API CALL
Meliton Marcos  Pulmonary Disease  [address] [place] [address]  Phone: [phone]  Fax: [phone]  Follow Up Time:     Palla, Venugopal Reddy  Cardiovascular Disease  [address] [facility], [address] [address]  Phone: [phone]  Fax: [phone]  Follow Up Time:     Mona Adams  Cardiac Electrophysiology  [address] [place] [address]  Phone: [phone]  Fax: [phone]  Follow Up Time:

## 2024-01-30 PROBLEM — I49.3 VENTRICULAR PREMATURE DEPOLARIZATION: Chronic | Status: ACTIVE | Noted: 2024-01-28

## 2024-02-05 DIAGNOSIS — I49.3 VENTRICULAR PREMATURE DEPOLARIZATION: ICD-10-CM

## 2024-02-05 DIAGNOSIS — E78.5 HYPERLIPIDEMIA, UNSPECIFIED: ICD-10-CM

## 2024-02-05 DIAGNOSIS — I10 ESSENTIAL (PRIMARY) HYPERTENSION: ICD-10-CM

## 2024-02-05 DIAGNOSIS — J45.909 UNSPECIFIED ASTHMA, UNCOMPLICATED: ICD-10-CM

## 2024-02-05 DIAGNOSIS — Z95.818 PRESENCE OF OTHER CARDIAC IMPLANTS AND GRAFTS: ICD-10-CM

## 2024-02-05 DIAGNOSIS — Z95.3 PRESENCE OF XENOGENIC HEART VALVE: ICD-10-CM

## 2024-02-05 DIAGNOSIS — I48.0 PAROXYSMAL ATRIAL FIBRILLATION: ICD-10-CM

## 2024-02-05 DIAGNOSIS — Z20.822 CONTACT WITH AND (SUSPECTED) EXPOSURE TO COVID-19: ICD-10-CM

## 2024-02-05 DIAGNOSIS — N40.0 BENIGN PROSTATIC HYPERPLASIA WITHOUT LOWER URINARY TRACT SYMPTOMS: ICD-10-CM

## 2024-02-05 DIAGNOSIS — Z86.79 PERSONAL HISTORY OF OTHER DISEASES OF THE CIRCULATORY SYSTEM: ICD-10-CM

## 2024-02-07 NOTE — ED ADULT NURSE NOTE - NSFALLRSKHARMRISK_ED_ALL_ED
Problem: Discharge Planning  Goal: Discharge to home or other facility with appropriate resources  1/27/2024 2142 by Sabra Barrios RN  Outcome: Progressing  1/27/2024 1105 by Jovana Soto RN  Outcome: Progressing     Problem: Pain  Goal: Verbalizes/displays adequate comfort level or baseline comfort level  1/27/2024 2142 by Sabra Barrios RN  Outcome: Progressing  1/27/2024 1105 by Jovana Soto RN  Outcome: Progressing  Flowsheets (Taken 1/27/2024 1105)  Verbalizes/displays adequate comfort level or baseline comfort level:   Encourage patient to monitor pain and request assistance   Assess pain using appropriate pain scale   Administer analgesics based on type and severity of pain and evaluate response     Problem: Safety - Adult  Goal: Free from fall injury  1/27/2024 2142 by Sabra Barrios RN  Outcome: Progressing  1/27/2024 1105 by Jovana Soto RN  Outcome: Progressing     Problem: Skin/Tissue Integrity  Goal: Absence of new skin breakdown  Description: 1.  Monitor for areas of redness and/or skin breakdown  2.  Assess vascular access sites hourly  3.  Every 4-6 hours minimum:  Change oxygen saturation probe site  4.  Every 4-6 hours:  If on nasal continuous positive airway pressure, respiratory therapy assess nares and determine need for appliance change or resting period.  Outcome: Progressing        Problem: Discharge Planning  Goal: Discharge to home or other facility with appropriate resources  2/5/2024 0754 by Mercedes Yancey RN  Outcome: Progressing  2/4/2024 2300 by Shahbaz Rock RN  Outcome: Progressing     Problem: Pain  Goal: Verbalizes/displays adequate comfort level or baseline comfort level  2/5/2024 0754 by Mercedes Yancey RN  Outcome: Progressing  Flowsheets (Taken 2/5/2024 0715)  Verbalizes/displays adequate comfort level or baseline comfort level: Encourage patient to monitor pain and request assistance  2/4/2024 2300 by Shahbaz Rock RN  Outcome: Progressing  2/4/2024 1754 by Blaze Estevez RN  Outcome: Progressing     Problem: Safety - Adult  Goal: Free from fall injury  2/5/2024 0754 by Mercedes Yancey RN  Outcome: Progressing  2/4/2024 2300 by Shahbaz Rock RN  Outcome: Progressing  2/4/2024 1754 by Blaze Estevez RN  Outcome: Progressing        Problem: Discharge Planning  Goal: Discharge to home or other facility with appropriate resources  2/6/2024 0746 by Mercedes Yancey RN  Outcome: Progressing  2/5/2024 2302 by Shahbaz Rock RN  Outcome: Progressing     Problem: Pain  Goal: Verbalizes/displays adequate comfort level or baseline comfort level  Recent Flowsheet Documentation  Taken 2/6/2024 0735 by Mercedes Yancey RN  Verbalizes/displays adequate comfort level or baseline comfort level: Encourage patient to monitor pain and request assistance  2/5/2024 2302 by Shahbaz Rock RN  Outcome: Progressing  Flowsheets  Taken 2/5/2024 1535 by Mercedes Yancey RN  Verbalizes/displays adequate comfort level or baseline comfort level: Encourage patient to monitor pain and request assistance  Taken 2/5/2024 1155 by Mercedes Yancey RN  Verbalizes/displays adequate comfort level or baseline comfort level: Encourage patient to monitor pain and request assistance     Problem: Safety - Adult  Goal: Free from fall injury  2/6/2024 0746 by Mercedes Yancey RN  Outcome: Progressing  2/5/2024 2302 by Shahbaz Rock RN  Outcome: Progressing     Problem: Skin/Tissue Integrity  Goal: Absence of new skin breakdown  Description: 1.  Monitor for areas of redness and/or skin breakdown  2.  Assess vascular access sites hourly  3.  Every 4-6 hours minimum:  Change oxygen saturation probe site  4.  Every 4-6 hours:  If on nasal continuous positive airway pressure, respiratory therapy assess nares and determine need for appliance change or resting period.  2/6/2024 0746 by Mercedes Yancey RN  Outcome: Progressing  2/5/2024 2302 by Shahbaz Rock RN  Outcome: Progressing        Problem: Discharge Planning  Goal: Discharge to home or other facility with appropriate resources  2/6/2024 2043 by Shahbaz Rock RN  Outcome: Progressing  2/6/2024 0746 by Mercedes Yancey RN  Outcome: Progressing  Flowsheets (Taken 2/6/2024 0735)  Discharge to home or other facility with appropriate resources: Identify barriers to discharge with patient and caregiver     Problem: Pain  Goal: Verbalizes/displays adequate comfort level or baseline comfort level  Outcome: Progressing  Flowsheets  Taken 2/6/2024 1515 by Mercedes Yancey RN  Verbalizes/displays adequate comfort level or baseline comfort level: Encourage patient to monitor pain and request assistance  Taken 2/6/2024 1128 by Mercedes Yancey RN  Verbalizes/displays adequate comfort level or baseline comfort level: Encourage patient to monitor pain and request assistance  Taken 2/6/2024 0735 by Mercedes Yancey RN  Verbalizes/displays adequate comfort level or baseline comfort level: Encourage patient to monitor pain and request assistance     Problem: Safety - Adult  Goal: Free from fall injury  2/6/2024 2043 by Shahbaz Rock RN  Outcome: Progressing  2/6/2024 0746 by Mercedes Yancey RN  Outcome: Progressing     Problem: Skin/Tissue Integrity  Goal: Absence of new skin breakdown  Description: 1.  Monitor for areas of redness and/or skin breakdown  2.  Assess vascular access sites hourly  3.  Every 4-6 hours minimum:  Change oxygen saturation probe site  4.  Every 4-6 hours:  If on nasal continuous positive airway pressure, respiratory therapy assess nares and determine need for appliance change or resting period.  2/6/2024 2043 by Shahbaz Rock RN  Outcome: Progressing  2/6/2024 0746 by Mercedes Yancey RN  Outcome: Progressing     Problem: Skin/Tissue Integrity - Adult  Goal: Skin integrity remains intact  Outcome: Progressing  Flowsheets (Taken 2/6/2024 0735 by Mercedes Yancey RN)  Skin Integrity Remains Intact: Monitor for areas of redness    Problem: Discharge Planning  Goal: Discharge to home or other facility with appropriate resources  Outcome: Progressing     Problem: Pain  Goal: Verbalizes/displays adequate comfort level or baseline comfort level  2/4/2024 2300 by Shahbaz Rock RN  Outcome: Progressing  2/4/2024 1754 by Blaze Estevez RN  Outcome: Progressing     Problem: Safety - Adult  Goal: Free from fall injury  2/4/2024 2300 by Shahbaz Rock RN  Outcome: Progressing  2/4/2024 1754 by Blaze Estevez RN  Outcome: Progressing     Problem: Skin/Tissue Integrity  Goal: Absence of new skin breakdown  Description: 1.  Monitor for areas of redness and/or skin breakdown  2.  Assess vascular access sites hourly  3.  Every 4-6 hours minimum:  Change oxygen saturation probe site  4.  Every 4-6 hours:  If on nasal continuous positive airway pressure, respiratory therapy assess nares and determine need for appliance change or resting period.  2/4/2024 2300 by Shahbaz Rokc RN  Outcome: Progressing  2/4/2024 1754 by Blaze Estevez RN  Outcome: Progressing     Problem: Skin/Tissue Integrity - Adult  Goal: Skin integrity remains intact  2/4/2024 2300 by Shahbaz Rock RN  Outcome: Progressing  2/4/2024 1754 by Blaze Estevez RN  Outcome: Progressing  Flowsheets  Taken 2/4/2024 1154  Skin Integrity Remains Intact: Monitor for areas of redness and/or skin breakdown  Taken 2/4/2024 0800  Skin Integrity Remains Intact:   Monitor for areas of redness and/or skin breakdown   Assess vascular access sites hourly        Problem: Discharge Planning  Goal: Discharge to home or other facility with appropriate resources  Outcome: Progressing     Problem: Pain  Goal: Verbalizes/displays adequate comfort level or baseline comfort level  Outcome: Progressing     Problem: Safety - Adult  Goal: Free from fall injury  Outcome: Progressing     Problem: Skin/Tissue Integrity  Goal: Absence of new skin breakdown  Description: 1.  Monitor for areas of redness and/or skin breakdown  2.  Assess vascular access sites hourly  3.  Every 4-6 hours minimum:  Change oxygen saturation probe site  4.  Every 4-6 hours:  If on nasal continuous positive airway pressure, respiratory therapy assess nares and determine need for appliance change or resting period.  Outcome: Progressing        Problem: Discharge Planning  Goal: Discharge to home or other facility with appropriate resources  Outcome: Progressing     Problem: Pain  Goal: Verbalizes/displays adequate comfort level or baseline comfort level  Outcome: Progressing     Problem: Safety - Adult  Goal: Free from fall injury  Outcome: Progressing     Problem: Skin/Tissue Integrity  Goal: Absence of new skin breakdown  Description: 1.  Monitor for areas of redness and/or skin breakdown  2.  Assess vascular access sites hourly  3.  Every 4-6 hours minimum:  Change oxygen saturation probe site  4.  Every 4-6 hours:  If on nasal continuous positive airway pressure, respiratory therapy assess nares and determine need for appliance change or resting period.  Outcome: Progressing        Problem: Discharge Planning  Goal: Discharge to home or other facility with appropriate resources  Outcome: Progressing     Problem: Pain  Goal: Verbalizes/displays adequate comfort level or baseline comfort level  Outcome: Progressing  Flowsheets (Taken 1/27/2024 1105)  Verbalizes/displays adequate comfort level or baseline comfort level:   Encourage patient to monitor pain and request assistance   Assess pain using appropriate pain scale   Administer analgesics based on type and severity of pain and evaluate response     Problem: Safety - Adult  Goal: Free from fall injury  Outcome: Progressing        Problem: Occupational Therapy - Adult  Goal: By Discharge: Performs self-care activities at highest level of function for planned discharge setting.  See evaluation for individualized goals.  Description: FUNCTIONAL STATUS PRIOR TO ADMISSION:  pt is poor historian, chart review stated pt was admitted from SNF. Pt did state he normally receives assistance with LE dressing (socks), reports some independence with donning pants and UE dressing.   Receives Help From: Other (comment), ADL Assistance: Independent,  ,  ,  ,  ,  ,  , Ambulation Assistance: Independent, Transfer Assistance: Independent, Active : No     HOME SUPPORT: per chart, pt was living in 1 [place] home with caregiver     Occupational Therapy Goals:  Initiated 1/30/2024  1.  Patient will perform grooming seated at EOB with Contact Guard Assist and good dynamic sitting balance within 7 day(s).  2.  Patient will perform upper body dressing with Minimal Assist within 7 day(s).  3.  Patient will perform upper body bathing while seated at EOB with Minimal Assist within 7 day(s).  4.  Patient will perform toilet transfers on BSC with Moderate Assist  within 7 day(s).  5.  Patient will perform all aspects of toileting with Moderate Assist within 7 day(s).  6.  Patient will participate in upper extremity therapeutic exercise/activities with Minimal Assist for 5 minutes within 7 day(s).    7.  Patient will utilize energy conservation techniques during functional activities with verbal cues within 7 day(s).   2/2/2024 1003 by Mando Stringer, FREDY  Outcome: Not Progressing     Problem: Occupational Therapy - Adult  Goal: By Discharge: Performs self-care activities at highest level of function for planned discharge setting.  See evaluation for individualized goals.  Description: FUNCTIONAL STATUS PRIOR TO ADMISSION:  pt is poor historian, chart review stated pt was admitted from SNF. Pt did state he normally receives assistance with LE dressing (socks),    Problem: Occupational Therapy - Adult  Goal: By Discharge: Performs self-care activities at highest level of function for planned discharge setting.  See evaluation for individualized goals.  Description: FUNCTIONAL STATUS PRIOR TO ADMISSION:  pt is poor historian, chart review stated pt was admitted from SNF. Pt did state he normally receives assistance with LE dressing (socks), reports some independence with donning pants and UE dressing.   Receives Help From: Other (comment), ADL Assistance: Independent,  ,  ,  ,  ,  ,  , Ambulation Assistance: Independent, Transfer Assistance: Independent, Active : No     HOME SUPPORT: per chart, pt was living in 1 story home with caregiver     Occupational Therapy Goals:  Initiated 1/30/2024  1.  Patient will perform grooming seated at EOB with Contact Guard Assist and good dynamic sitting balance within 7 day(s).  2.  Patient will perform upper body dressing with Minimal Assist within 7 day(s).  3.  Patient will perform upper body bathing while seated at EOB with Minimal Assist within 7 day(s).  4.  Patient will perform toilet transfers on BSC with Moderate Assist  within 7 day(s).  5.  Patient will perform all aspects of toileting with Moderate Assist within 7 day(s).  6.  Patient will participate in upper extremity therapeutic exercise/activities with Minimal Assist for 5 minutes within 7 day(s).    7.  Patient will utilize energy conservation techniques during functional activities with verbal cues within 7 day(s).   Outcome: Not Progressing    OCCUPATIONAL THERAPY TREATMENT  Patient: Quinn Amador (83 y.o. male)  Date: 2/2/2024  Primary Diagnosis: GI bleed [K92.2]       Precautions: Fall Risk, Bed Alarm, Up as Tolerated                Chart, occupational therapy assessment, plan of care, and goals were reviewed.    ASSESSMENT  Patient continues to benefit from skilled OT services and is not progressing towards goals. Pt received semi-rubio in bed, agreeable to    Problem: Occupational Therapy - Adult  Goal: By Discharge: Performs self-care activities at highest level of function for planned discharge setting.  See evaluation for individualized goals.  Description: FUNCTIONAL STATUS PRIOR TO ADMISSION:  pt is poor historian, chart review stated pt was admitted from SNF. Pt did state he normally receives assistance with LE dressing (socks), reports some independence with donning pants and UE dressing.   Receives Help From: Other (comment), ADL Assistance: Independent,  ,  ,  ,  ,  ,  , Ambulation Assistance: Independent, Transfer Assistance: Independent, Active : No     HOME SUPPORT: per chart, pt was living in 1 story home with caregiver     Occupational Therapy Goals:  Initiated 1/30/2024. Weekly re-assessment performed on 2/5/24, all goals still remain appropriate.   1.  Patient will perform grooming seated at EOB with Contact Guard Assist and good dynamic sitting balance within 7 day(s).  2.  Patient will perform upper body dressing with Minimal Assist within 7 day(s).  3.  Patient will perform upper body bathing while seated at EOB with Minimal Assist within 7 day(s).  4.  Patient will perform toilet transfers on [facility] with Moderate Assist  within 7 day(s).  5.  Patient will perform all aspects of toileting with Moderate Assist within 7 day(s).  6.  Patient will participate in upper extremity therapeutic exercise/activities with Minimal Assist for 5 minutes within 7 day(s).    7.  Patient will utilize energy conservation techniques during functional activities with verbal cues within 7 day(s).   Outcome: Not Progressing    OCCUPATIONAL THERAPY TREATMENT: WEEKLY REASSESSMENT    Patient: Quinn Amador (83 y.o. male)  Date: 2/5/2024  Primary Diagnosis: GI bleed [K92.2]       Precautions: Fall Risk, Bed Alarm, Up as Tolerated                Chart, occupational therapy assessment, plan of care, and goals were reviewed.    ASSESSMENT  Patient continues to benefit from    Problem: Pain  Goal: Verbalizes/displays adequate comfort level or baseline comfort level  2/4/2024 0055 by Nichole Adler RN  Outcome: Progressing  2/3/2024 1740 by Blaze Estevez RN  Outcome: Progressing     Problem: Safety - Adult  Goal: Free from fall injury  Outcome: Progressing     Problem: Skin/Tissue Integrity  Goal: Absence of new skin breakdown  Description: 1.  Monitor for areas of redness and/or skin breakdown  2.  Assess vascular access sites hourly  3.  Every 4-6 hours minimum:  Change oxygen saturation probe site  4.  Every 4-6 hours:  If on nasal continuous positive airway pressure, respiratory therapy assess nares and determine need for appliance change or resting period.  2/4/2024 0055 by Nichole Adler RN  Outcome: Progressing  2/3/2024 1740 by Blaze Estevez RN  Outcome: Progressing     Problem: Skin/Tissue Integrity - Adult  Goal: Skin integrity remains intact  2/4/2024 0055 by Nichole Adler RN  Outcome: Progressing        Problem: Pain  Goal: Verbalizes/displays adequate comfort level or baseline comfort level  Outcome: Progressing     Problem: Safety - Adult  Goal: Free from fall injury  Outcome: Progressing     Problem: Skin/Tissue Integrity  Goal: Absence of new skin breakdown  Description: 1.  Monitor for areas of redness and/or skin breakdown  2.  Assess vascular access sites hourly  3.  Every 4-6 hours minimum:  Change oxygen saturation probe site  4.  Every 4-6 hours:  If on nasal continuous positive airway pressure, respiratory therapy assess nares and determine need for appliance change or resting period.  Outcome: Progressing        Problem: Physical Therapy - Adult  Goal: By Discharge: Performs mobility at highest level of function for planned discharge setting.  See evaluation for individualized goals.  Description: FUNCTIONAL STATUS PRIOR TO ADMISSION: patient currently a poor historian; chart states patient admitted from SNF.    HOME SUPPORT PRIOR TO ADMISSION: per CM note the patient lives with a caregiver in a 1 story home.    Physical Therapy Goals  Initiated 1/30/2024  1.  Patient will move from supine to sit and sit to supine, scoot up and down, and roll side to side in bed with moderate assistance within 7 day(s).    2.  Patient will perform sit to stand with moderate assistance within 7 day(s).  3.  Patient will transfer from bed to chair and chair to bed with moderate assistance x 2 using the least restrictive device within 7 day(s).  4.  Patient will ambulate with moderate assistance x 2 for 10 feet with the least restrictive device within 7 day(s).   Outcome: Not Progressing  PHYSICAL THERAPY TREATMENT    Patient: Quinn Amador (83 y.o. male)  Date: 2/5/2024  Diagnosis: GI bleed [K92.2] GI bleed      Precautions: Fall Risk, Bed Alarm, Up as Tolerated                      ASSESSMENT:  Patient continues to benefit from skilled PT services and is not progressing towards goals. Patient confused with intermittent command following. Patient able to actively perform ankle pumps and elbow flexion extension but reports increased pain when attempting to move UE/Les. Patient is unwilling to actively participate in bed mobility and refusing to attempt EOB. He currently requires total A for overall mobility.  Patient with decreased activity tolerance secondary to pain. Will decrease frequency to 3x/wk secondary poor activity tolerance and willingness to participate in therapy. Per chart review, patient is not a candidate for IP Hospice and comfort care has been recommended. Family would still like to pursue SNF rehab at this time.         Problem: Skin/Tissue Integrity - Adult  Goal: Skin integrity remains intact  Recent Flowsheet Documentation  Taken 2/1/2024 0715 by Yanira Cordova RN  Skin Integrity Remains Intact: Monitor for areas of redness and/or skin breakdown      Consult received. Chart from [facility] reviewed. Admission from last week showed hgb 6 with duodenitis on EGD and poor prep colonoscopy. Discharge with hgb 7.5. High risk for procedures. Will likely avoid procedures unless obvious significant bleeding. In addition noted to have metastatic prostate cancer.     Okay to start clear liquid diet and monitor for further bleeding.   No plan for endoscopy over weekend at this time.     Full note to follow. Please call with any questions/concerns.     Anshu Jones MD  [facility]   Patient is alert and oriented x2. No pain or discomfort. IV line placed by PICC team. IVF held per MD. In bed, turned and re-positioned. No acute events. Eating current diet. Tolerating well. Safety precaution in place.       Problem: Pain  Goal: Verbalizes/displays adequate comfort level or baseline comfort level  Outcome: Progressing     Problem: Skin/Tissue Integrity  Goal: Absence of new skin breakdown  Description: 1.  Monitor for areas of redness and/or skin breakdown  2.  Assess vascular access sites hourly  3.  Every 4-6 hours minimum:  Change oxygen saturation probe site  4.  Every 4-6 hours:  If on nasal continuous positive airway pressure, respiratory therapy assess nares and determine need for appliance change or resting period.  Outcome: Progressing     Problem: Skin/Tissue Integrity - Adult  Goal: Skin integrity remains intact  Outcome: Progressing      Patient remains stable. Mentation is improving. Patient is more awake and interactive with staff. Eating well after set up. Turned and re-position. Had BM. Voiding freely. Safety precaution in place.       Problem: Pain  Goal: Verbalizes/displays adequate comfort level or baseline comfort level  Outcome: Progressing     Problem: Safety - Adult  Goal: Free from fall injury  Outcome: Progressing     Problem: Skin/Tissue Integrity  Goal: Absence of new skin breakdown  Description: 1.  Monitor for areas of redness and/or skin breakdown  2.  Assess vascular access sites hourly  3.  Every 4-6 hours minimum:  Change oxygen saturation probe site  4.  Every 4-6 hours:  If on nasal continuous positive airway pressure, respiratory therapy assess nares and determine need for appliance change or resting period.  Outcome: Progressing     Problem: Skin/Tissue Integrity - Adult  Goal: Skin integrity remains intact  Outcome: Progressing  Flowsheets  Taken 2/4/2024 1154  Skin Integrity Remains Intact: Monitor for areas of redness and/or skin breakdown  Taken 2/4/2024 0800  Skin Integrity Remains Intact:   Monitor for areas of redness and/or skin breakdown   Assess vascular access sites hourly      5 days/week in Skilled nursing facility    Other factors to consider for discharge: patient's current support system is unable to meet their requirements for physical assistance, impaired cognition, high risk for falls, not safe to be alone, concern for safely navigating or managing the home environment.    IF patient discharges home will need the following DME: patient owns DME required for discharge and continuing to assess with progress       SUBJECTIVE:   Patient stated, \" I appreciate your help today. \"    OBJECTIVE DATA SUMMARY:   Critical Behavior:  Orientation  Orientation Level: Disoriented to situation;Disoriented to time;Oriented to person;Oriented to place  Cognition  Overall Cognitive Status: WNL    Functional Mobility Training:  Bed Mobility:  Bed Mobility Training  Bed Mobility Training: Yes  Interventions: Tactile cues;Safety awareness training;Verbal cues;Visual cues;Manual cues  Rolling: Moderate assistance;Maximum assistance  Supine to Sit: Maximum assistance;Assist X2  Scooting: Maximum assistance  Transfers:  Transfer Training  Transfer Training: Yes  Interventions: Verbal cues;Safety awareness training;Manual cues;Tactile cues;Visual cues  Sit to Stand: Maximum assistance;Total assistance;Assist X2  Stand to Sit: Maximum assistance;Assist X2  Bed to Chair: Maximum assistance;Assist X2 (SPT)  Balance:  Balance  Sitting: Impaired  Sitting - Static: Fair (occasional);Poor (constant support)  Sitting - Dynamic: Poor (constant support)  Standing: Impaired  Standing - Static: Poor;Constant support  Standing - Dynamic: Poor;Constant support   Ambulation/Gait Training:    Non-ambulatory     Neuro Re-Education:    Worked on maintaining upright/midline positioning in sitting while OT provided ADL training.       Therapeutic Exercises:     EXERCISE   Sets   Reps   Active Active Assist   Passive Self ROM   Comments   Ankle Pumps  10 [] [x] [] []    Quad Sets   [] [] [] []    Hamstring Sets   [] [] [] []   balance within 7 day(s).  2.  Patient will perform upper body dressing with Minimal Assist within 7 day(s).  3.  Patient will perform upper body bathing while seated at EOB with Minimal Assist within 7 day(s).  4.  Patient will perform toilet transfers on BSC with Moderate Assist  within 7 day(s).  5.  Patient will perform all aspects of toileting with Moderate Assist within 7 day(s).  6.  Patient will participate in upper extremity therapeutic exercise/activities with Minimal Assist for 5 minutes within 7 day(s).    7.  Patient will utilize energy conservation techniques during functional activities with verbal cues within 7 day(s).   2/5/2024 1619 by Mando Stringer, OT  Outcome: Not Progressing      No  Patient's  Info: significant other - Genoveva Smallwood assists with transportation when needs arise  Mode of Transportation: Car  Occupation: Retired    Cognitive/Behavioral Status:  Orientation  Orientation Level: Oriented to person;Disoriented to situation;Disoriented to time;Disoriented to place  Cognition  Overall Cognitive Status: WNL    Skin: dry    Edema: none noted    Hearing:   Hearing  Hearing: Exceptions to WFL  Hearing Exceptions: Hard of hearing/hearing concerns    Vision/Perceptual:          Vision  Vision: Impaired       Strength:    Strength: Generally decreased, functional    Tone & Sensation:   Tone: Normal       Coordination:  Coordination: Generally decreased, functional    Range Of Motion:  AROM: Generally decreased, functional  PROM: Within functional limits    Functional Mobility:  Bed Mobility:     Bed Mobility Training  Bed Mobility Training: Yes  Interventions: Manual cues;Tactile cues;Verbal cues;Visual cues  Rolling: Moderate assistance;Maximum assistance  Supine to Sit: Maximum assistance;Assist X2  Sit to Supine: Total assistance;Assist X2  Scooting: Maximum assistance  Transfers:     Transfer Training  Transfer Training: Yes  Sit to Stand: Total assistance;Assist X2;Maximum assistance  Stand to Sit: Maximum assistance;Assist X2  Bed to Chair:  (deferred due to AMS and high HR into the 160's)  Balance:        Balance  Sitting: Impaired  Sitting - Static: Fair (occasional);Poor (constant support)  Sitting - Dynamic: Poor (constant support)  Standing: Impaired  Standing - Static: Not tested;Poor  Standing - Dynamic: Not tested  Ambulation/Gait Training:            Therapeutic Exercises:     EXERCISE   Sets   Reps   Active Active Assist   Passive Self ROM   Comments   Ankle Pumps  5 [] [x] [] []    Quad Sets   [] [] [] []    Hamstring Sets   [] [] [] []    Gluteal Sets   [] [] [] []    Short Arc Quads   [] [] [] []    Heel Slides  5 [] [x] [] []    Straight Leg Raises   [] [] [] []   nurse    Patient Education  Education Given To: Patient  Education Provided: Role of Therapy  Education Method: Verbal  Barriers to Learning: None  Education Outcome: Verbalized understanding    Thank you for this referral.  Mando Stringer OT  Minutes: 38    Occupational Therapy Evaluation Charge Determination   History Examination Decision-Making   MEDIUM Complexity : Expanded review of history including physical, cognitive and psychocial history  MEDIUM Complexity: 3-5 Performance deficits relating to physical, cognitive, or psychosocial skills that result in activity limitations and/or participation restrictions MEDIUM Complexity: Patient may present with comorbidities that affect occupational performance. Minimal to moderate modifications of tasks or assist (eg. physical or verbal) with assist is necessary to enable pt to complete eval   Based on the above components, the patient evaluation is determined to be of the following complexity level: Medium       yes

## 2024-02-15 ENCOUNTER — APPOINTMENT (OUTPATIENT)
Dept: CARDIOLOGY | Facility: CLINIC | Age: 84
End: 2024-02-15

## 2024-02-16 ENCOUNTER — APPOINTMENT (OUTPATIENT)
Dept: CARDIOLOGY | Facility: CLINIC | Age: 84
End: 2024-02-16
Payer: MEDICARE

## 2024-02-16 ENCOUNTER — NON-APPOINTMENT (OUTPATIENT)
Age: 84
End: 2024-02-16

## 2024-02-16 VITALS
BODY MASS INDEX: 24.25 KG/M2 | SYSTOLIC BLOOD PRESSURE: 128 MMHG | HEIGHT: 68 IN | DIASTOLIC BLOOD PRESSURE: 72 MMHG | OXYGEN SATURATION: 98 % | WEIGHT: 160 LBS | HEART RATE: 56 BPM

## 2024-02-16 DIAGNOSIS — I48.0 PAROXYSMAL ATRIAL FIBRILLATION: ICD-10-CM

## 2024-02-16 DIAGNOSIS — I35.1 NONRHEUMATIC AORTIC (VALVE) INSUFFICIENCY: ICD-10-CM

## 2024-02-16 PROCEDURE — 93000 ELECTROCARDIOGRAM COMPLETE: CPT

## 2024-02-16 PROCEDURE — 99214 OFFICE O/P EST MOD 30 MIN: CPT

## 2024-02-16 RX ORDER — LOSARTAN POTASSIUM 100 MG/1
100 TABLET, FILM COATED ORAL DAILY
Qty: 90 | Refills: 0 | Status: DISCONTINUED | COMMUNITY
End: 2024-02-16

## 2024-02-16 RX ORDER — ACEBUTOLOL HYDROCHLORIDE 200 MG/1
200 CAPSULE ORAL
Qty: 180 | Refills: 0 | Status: DISCONTINUED | COMMUNITY
Start: 2024-01-26 | End: 2024-02-16

## 2024-02-16 NOTE — REVIEW OF SYSTEMS
[Negative] : Heme/Lymph [Earache] : no earache [Discharge From Ears] : no discharge from the ears [Sore Throat] : no sore throat [Sinus Pressure] : no sinus pressure [SOB] : no shortness of breath [Chest Discomfort] : no chest discomfort [Palpitations] : no palpitations [Orthopnea] : no orthopnea [Syncope] : no syncope [Cough] : no cough [Abdominal Pain] : no abdominal pain [Change in Appetite] : no change in appetite

## 2024-02-16 NOTE — DISCUSSION/SUMMARY
[FreeTextEntry1] : Patient with above hx   palpitations : symptomatic PVCS with bradycardia , better on sotalol ,  (9.5% PVC burden prior to starting on sotalol )  had no evidence of ischemia ,  normal EF   , follow up with EP ,telemetry in place  continue sotalol 40 mg po BID   s/p ascending aorta , arch  reimplantation of coronaries , BLESSING occlusion with palpitation , recurrent atrial fibrillation ,  s/p cardioversion   now in marked sinus bradycardia  , continue ecotrin   stable size on follow up  CT   HTN .hypertensive heart disease  controlled ,continue current medication  low salt diet   Hyperlipidemia : continue statin    asthma: bullous lung disease stable ,  controlled  without inhaler   follow up after  6 weeks   [EKG obtained to assist in diagnosis and management of assessed problem(s)] : EKG obtained to assist in diagnosis and management of assessed problem(s)

## 2024-02-16 NOTE — HISTORY OF PRESENT ILLNESS
[FreeTextEntry1] : patient with history of PAF HTN s/p GIORGIO cardioversion  twice in 2011 , 2015  , 2018, asthma    Patient  had surgery for AAA , aortic root, valve , arch replacement,  closed BLESSING ,9/13/18 , with post op recurrence afib ,  Patient was cardioverted sinus rhythm   came for follow up after hospitalization with  increasing palpitation ,associated PVCS , patient had negative troponin  normal EF , No evidence of ischemia on stress test , Patient was placed on sotalol . patient is feeling much better , his heart rate  50-55 BPM , patient palpitations improved mostly , his BP is also controlled , stopped taking losartan ,   patient denies associated chest pain or shortness of breath , denies any associated dizziness ,   Patient had   CT angio of chest  as patient AVR with root replacement ,ascending and arch  showed stable changes  done  in august 2023    blood work July 2023  normal  LDL 67      hospital records reviewed.   
yes

## 2024-02-16 NOTE — CARDIOLOGY SUMMARY
[de-identified] : 2/16/24  sinus bradycardia  low QRS voltage normal QT  [de-identified] : 1/29/24 Normal chemical nuclear stress test  EF 73% [de-identified] : 1/27/24  Mild LVH EF 65-70 % Normal Bio AVR hx of ascending ,arch of aorta repair ( no significant change )  ( [facility] ) [de-identified] : CT  3.5 cm  no change  [de-identified] : 9/13/18 Non obstructive disease   [de-identified] : 9/13/18  aortic arch , aortic root , valve replaced , closed BLESSING  [de-identified] : jan 2021 Minimal carotid atherosclerosis

## 2024-02-16 NOTE — PHYSICAL EXAM
[Well Developed] : well developed [Well Nourished] : well nourished [No Acute Distress] : no acute distress [Normal Conjunctiva] : normal conjunctiva [Normal Venous Pressure] : normal venous pressure [No Carotid Bruit] : no carotid bruit [No Rub] : no rub [Normal S1, S2] : normal S1, S2 [No Gallop] : no gallop [Murmur] : murmur [Clear Lung Fields] : clear lung fields [Good Air Entry] : good air entry [No Respiratory Distress] : no respiratory distress  [Soft] : abdomen soft [Non Tender] : non-tender [Normal Bowel Sounds] : normal bowel sounds [Normal Gait] : normal gait [No Edema] : no edema [No Cyanosis] : no cyanosis [No Clubbing] : no clubbing [No Varicosities] : no varicosities [No Rash] : no rash [No Skin Lesions] : no skin lesions [Moves all extremities] : moves all extremities [No Focal Deficits] : no focal deficits [Normal Speech] : normal speech [Alert and Oriented] : alert and oriented [Normal memory] : normal memory [de-identified] : 2/6 SM

## 2024-03-21 ENCOUNTER — APPOINTMENT (OUTPATIENT)
Dept: ELECTROPHYSIOLOGY | Facility: CLINIC | Age: 84
End: 2024-03-21
Payer: MEDICARE

## 2024-03-21 VITALS
HEIGHT: 68 IN | OXYGEN SATURATION: 97 % | DIASTOLIC BLOOD PRESSURE: 76 MMHG | HEART RATE: 51 BPM | SYSTOLIC BLOOD PRESSURE: 155 MMHG | WEIGHT: 159 LBS | BODY MASS INDEX: 24.1 KG/M2

## 2024-03-21 PROCEDURE — 99215 OFFICE O/P EST HI 40 MIN: CPT

## 2024-03-21 NOTE — HISTORY OF PRESENT ILLNESS
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  Patient has been on sotalol 40 mg bid, dose limited due to SB. Patient was monitored in hospital in January 2024 for PVC NSVT management. Pt has significant improvement in palpitations/dizziness while on low dose sotalol.  Denies cp, sob, dizziness.  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  ECG 2.16.24 SR 53 bpm QTc 430ms  Cardiac workup: MPI stress test from 1/20/2024 shows no evidence of perfusion defect normal LVEF MCT monitor from 1/20/2024 shows multiple episodes of NSVT up to 5 beats and PVC overall burden 1% predominant sinus rhythm

## 2024-03-21 NOTE — DISCUSSION/SUMMARY
[FreeTextEntry1] : 84-year-old male with a symptomatic PVC occasional NSVT he has a significant symptom improvement while on sotalol 40 mg twice daily has sinus bradycardia cannot tolerate higher dose of sotalol.  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  Patient also has bioprosthetic AVR and aortic root replacement as well as BLESSING excision. We discussed that if patient has increase in PVC symptoms should undergo EPS PVC ablation procedure. Repeat ZIO in 6 months and follow-up  I spent a total of 45 minutes on the encounter evaluating and discussing treatment options with the patient, as well as counseling and coordination of care as stated above.

## 2024-03-29 ENCOUNTER — APPOINTMENT (OUTPATIENT)
Dept: CARDIOLOGY | Facility: CLINIC | Age: 84
End: 2024-03-29
Payer: MEDICARE

## 2024-03-29 ENCOUNTER — NON-APPOINTMENT (OUTPATIENT)
Age: 84
End: 2024-03-29

## 2024-03-29 VITALS
HEIGHT: 68 IN | WEIGHT: 159 LBS | OXYGEN SATURATION: 97 % | HEART RATE: 58 BPM | BODY MASS INDEX: 24.1 KG/M2 | SYSTOLIC BLOOD PRESSURE: 110 MMHG | DIASTOLIC BLOOD PRESSURE: 56 MMHG

## 2024-03-29 DIAGNOSIS — R00.2 PALPITATIONS: ICD-10-CM

## 2024-03-29 PROCEDURE — 93000 ELECTROCARDIOGRAM COMPLETE: CPT

## 2024-03-29 PROCEDURE — 99214 OFFICE O/P EST MOD 30 MIN: CPT

## 2024-03-29 PROCEDURE — G2211 COMPLEX E/M VISIT ADD ON: CPT

## 2024-03-29 RX ORDER — ASPIRIN 81 MG
81 TABLET, DELAYED RELEASE (ENTERIC COATED) ORAL
Refills: 0 | Status: ACTIVE | COMMUNITY

## 2024-03-29 RX ORDER — SOTALOL HYDROCHLORIDE 80 MG/1
80 TABLET ORAL TWICE DAILY
Qty: 90 | Refills: 1 | Status: ACTIVE | COMMUNITY

## 2024-03-29 RX ORDER — LOSARTAN POTASSIUM 100 MG/1
100 TABLET, FILM COATED ORAL DAILY
Refills: 0 | Status: ACTIVE | COMMUNITY

## 2024-03-29 NOTE — REVIEW OF SYSTEMS
[Earache] : no earache [Discharge From Ears] : no discharge from the ears [Sore Throat] : no sore throat [Sinus Pressure] : no sinus pressure [SOB] : no shortness of breath [Chest Discomfort] : no chest discomfort [Palpitations] : no palpitations [Orthopnea] : no orthopnea [Syncope] : no syncope [Cough] : no cough [Abdominal Pain] : no abdominal pain [Change in Appetite] : no change in appetite [Negative] : Heme/Lymph

## 2024-03-29 NOTE — HISTORY OF PRESENT ILLNESS
[FreeTextEntry1] : patient with history of PAF HTN s/p GIORGIO cardioversion  twice in 2011 , 2015  , 2018, asthma    Patient  had surgery for AAA , aortic root, valve , arch replacement,  closed BLESSING ,9/13/18 , with post op recurrence afib ,  Patient was cardioverted sinus rhythm   came for follow up after hospitalization with  increasing palpitation ,associated PVCS , patient had negative troponin  normal EF , No evidence of ischemia on stress test , Patient was placed on sotalol . patient is feeling much better , his heart rate  50-55 BPM , patient palpitations occurs very rarely , his BP is better after resuming losartan 100 mg , norvasc ,   patient denies associated chest pain or shortness of breath , denies any associated dizziness ,   Patient had   CT angio of chest  as patient AVR with root replacement ,ascending and arch  showed stable changes  done  in august 2023    blood work July 2023  normal  LDL 67

## 2024-03-29 NOTE — DISCUSSION/SUMMARY
[FreeTextEntry1] : Patient with above hx   palpitations : symptomatic PVCS with bradycardia , better on sotalol ,  (9.5% PVC burden prior to starting on sotalol )  Now <1% on sotalol ,  had no evidence of ischemia ,  normal EF   , follow up with EP , Patient prefer to continue medication instead of pursuing ablation at this time continue sotalol 40 mg po BID   s/p ascending aorta , arch  reimplantation of coronaries , BLESSING occlusion with palpitation , recurrent atrial fibrillation ,  s/p cardioversion   now in marked sinus bradycardia  , continue ecotrin   stable size on follow up  CT   HTN .hypertensive heart disease  controlled ,continue current medication  low salt diet   Hyperlipidemia : continue statin    asthma: bullous lung disease stable ,  controlled  without inhaler   follow up after 3 months   [EKG obtained to assist in diagnosis and management of assessed problem(s)] : EKG obtained to assist in diagnosis and management of assessed problem(s)

## 2024-03-29 NOTE — PHYSICAL EXAM

## 2024-03-29 NOTE — CARDIOLOGY SUMMARY
[de-identified] : 2/16/24  sinus bradycardia  low QRS voltage normal QT  [de-identified] : 1/29/24 Normal chemical nuclear stress test  EF 73% [de-identified] : 1/27/24  Mild LVH EF 65-70 % Normal Bio AVR hx of ascending ,arch of aorta repair ( no significant change )  ( [facility] ) [de-identified] : CT  3.5 cm  no change  [de-identified] : 9/13/18 Non obstructive disease   [de-identified] : 9/13/18  aortic arch , aortic root , valve replaced , closed BLESSING  [de-identified] : jan 2021 Minimal carotid atherosclerosis

## 2024-04-01 LAB
ANION GAP SERPL CALC-SCNC: 13 MMOL/L
BUN SERPL-MCNC: 23 MG/DL
CALCIUM SERPL-MCNC: 9 MG/DL
CHLORIDE SERPL-SCNC: 105 MMOL/L
CO2 SERPL-SCNC: 23 MMOL/L
CREAT SERPL-MCNC: 1.06 MG/DL
EGFR: 70 ML/MIN/1.73M2
GLUCOSE SERPL-MCNC: 99 MG/DL
POTASSIUM SERPL-SCNC: 4.9 MMOL/L
SODIUM SERPL-SCNC: 140 MMOL/L

## 2024-06-28 ENCOUNTER — NON-APPOINTMENT (OUTPATIENT)
Age: 84
End: 2024-06-28

## 2024-06-28 ENCOUNTER — APPOINTMENT (OUTPATIENT)
Dept: CARDIOLOGY | Facility: CLINIC | Age: 84
End: 2024-06-28
Payer: MEDICARE

## 2024-06-28 VITALS
TEMPERATURE: 98 F | DIASTOLIC BLOOD PRESSURE: 60 MMHG | RESPIRATION RATE: 14 BRPM | WEIGHT: 159 LBS | HEIGHT: 68 IN | OXYGEN SATURATION: 97 % | HEART RATE: 59 BPM | SYSTOLIC BLOOD PRESSURE: 108 MMHG | BODY MASS INDEX: 24.1 KG/M2

## 2024-06-28 VITALS
DIASTOLIC BLOOD PRESSURE: 60 MMHG | HEIGHT: 68 IN | HEART RATE: 59 BPM | OXYGEN SATURATION: 97 % | SYSTOLIC BLOOD PRESSURE: 108 MMHG

## 2024-06-28 DIAGNOSIS — E78.5 HYPERLIPIDEMIA, UNSPECIFIED: ICD-10-CM

## 2024-06-28 DIAGNOSIS — Z98.890 OTHER SPECIFIED POSTPROCEDURAL STATES: ICD-10-CM

## 2024-06-28 DIAGNOSIS — Z86.79 OTHER SPECIFIED POSTPROCEDURAL STATES: ICD-10-CM

## 2024-06-28 DIAGNOSIS — I10 ESSENTIAL (PRIMARY) HYPERTENSION: ICD-10-CM

## 2024-06-28 DIAGNOSIS — I49.3 VENTRICULAR PREMATURE DEPOLARIZATION: ICD-10-CM

## 2024-06-28 PROCEDURE — 93000 ELECTROCARDIOGRAM COMPLETE: CPT

## 2024-06-28 PROCEDURE — G2211 COMPLEX E/M VISIT ADD ON: CPT

## 2024-06-28 PROCEDURE — 99214 OFFICE O/P EST MOD 30 MIN: CPT

## 2024-07-18 DIAGNOSIS — M54.50 LOW BACK PAIN, UNSPECIFIED: ICD-10-CM

## 2024-07-22 RX ORDER — MAGNESIUM OXIDE 241.3 MG/1000MG
400 TABLET ORAL DAILY
Qty: 90 | Refills: 0 | Status: ACTIVE | COMMUNITY
Start: 2024-07-22 | End: 1900-01-01

## 2024-08-13 NOTE — ED ADULT NURSE NOTE - NURSING SKIN BRUISE
Detail Level: Detailed Consent: The patient's consent was obtained including but not limited to risks of crusting, scabbing, blistering, scarring, darker or lighter pigmentary change, recurrence, incomplete removal and infection. Show Applicator Variable?: Yes Number Of Freeze-Thaw Cycles: 3 freeze-thaw cycles Render Post-Care Instructions In Note?: no Duration Of Freeze Thaw-Cycle (Seconds): 3 Post-Care Instructions: I reviewed with the patient in detail post-care instructions. Patient is to wear sunprotection, and avoid picking at any of the treated lesions. Pt may apply Vaseline to crusted or scabbing areas. left

## 2024-09-03 NOTE — ED ADULT TRIAGE NOTE - NS ED TRIAGE HISTORIAN
Caller: Megan Post    Relationship: Self    Best call back number: [phone]     Caller requesting test results: YES    What test was performed: BLOOD WORK     When was the test performed: 09/02/2024    Where was the test performed: ER     Additional notes: PATIENT WOULD LIKE A CALL BACK TO DISCUSS TEST RESULTS.           
Will address at today's visit.   It does not yet appear that the note from ER has been signed yet at this current time. Will continue to refresh and review  
Patient

## 2024-09-04 NOTE — ED ADULT NURSE NOTE - PT NEEDS ASSIST
Schedule CT for early December  Schedule PFT when able  You should hear from pulmonary rehab to schedule that.   no

## 2024-10-25 ENCOUNTER — NON-APPOINTMENT (OUTPATIENT)
Age: 84
End: 2024-10-25

## 2024-10-25 ENCOUNTER — APPOINTMENT (OUTPATIENT)
Dept: CARDIOLOGY | Facility: CLINIC | Age: 84
End: 2024-10-25
Payer: MEDICARE

## 2024-10-25 VITALS
WEIGHT: 159 LBS | SYSTOLIC BLOOD PRESSURE: 114 MMHG | HEART RATE: 53 BPM | BODY MASS INDEX: 24.1 KG/M2 | DIASTOLIC BLOOD PRESSURE: 60 MMHG | TEMPERATURE: 98 F | OXYGEN SATURATION: 95 % | HEIGHT: 68 IN | RESPIRATION RATE: 14 BRPM

## 2024-10-25 VITALS
DIASTOLIC BLOOD PRESSURE: 60 MMHG | SYSTOLIC BLOOD PRESSURE: 114 MMHG | HEIGHT: 68 IN | OXYGEN SATURATION: 95 % | HEART RATE: 53 BPM

## 2024-10-25 DIAGNOSIS — Z98.890 OTHER SPECIFIED POSTPROCEDURAL STATES: ICD-10-CM

## 2024-10-25 DIAGNOSIS — I49.3 VENTRICULAR PREMATURE DEPOLARIZATION: ICD-10-CM

## 2024-10-25 DIAGNOSIS — I34.0 NONRHEUMATIC MITRAL (VALVE) INSUFFICIENCY: ICD-10-CM

## 2024-10-25 DIAGNOSIS — I47.29 OTHER VENTRICULAR TACHYCARDIA: ICD-10-CM

## 2024-10-25 DIAGNOSIS — I10 ESSENTIAL (PRIMARY) HYPERTENSION: ICD-10-CM

## 2024-10-25 DIAGNOSIS — Z86.79 OTHER SPECIFIED POSTPROCEDURAL STATES: ICD-10-CM

## 2024-10-25 DIAGNOSIS — E78.5 HYPERLIPIDEMIA, UNSPECIFIED: ICD-10-CM

## 2024-10-25 DIAGNOSIS — S32.029A UNSPECIFIED FRACTURE OF SECOND LUMBAR VERTEBRA, INITIAL ENCOUNTER FOR CLOSED FRACTURE: ICD-10-CM

## 2024-10-25 PROCEDURE — 93000 ELECTROCARDIOGRAM COMPLETE: CPT

## 2024-10-25 PROCEDURE — 99214 OFFICE O/P EST MOD 30 MIN: CPT

## 2024-10-25 PROCEDURE — G2211 COMPLEX E/M VISIT ADD ON: CPT

## 2024-10-29 LAB
25(OH)D3 SERPL-MCNC: 40.2 NG/ML
ALBUMIN SERPL ELPH-MCNC: 4.1 G/DL
ALP BLD-CCNC: 85 U/L
ALT SERPL-CCNC: 22 U/L
ANION GAP SERPL CALC-SCNC: 10 MMOL/L
APPEARANCE: CLEAR
AST SERPL-CCNC: 23 U/L
BASOPHILS # BLD AUTO: 0.04 K/UL
BASOPHILS NFR BLD AUTO: 0.9 %
BILIRUB SERPL-MCNC: 0.6 MG/DL
BILIRUBIN URINE: ABNORMAL
BLOOD URINE: NEGATIVE
BUN SERPL-MCNC: 18 MG/DL
CALCIUM SERPL-MCNC: 9.6 MG/DL
CHLORIDE SERPL-SCNC: 102 MMOL/L
CHOLEST SERPL-MCNC: 163 MG/DL
CO2 SERPL-SCNC: 26 MMOL/L
COLOR: NORMAL
CREAT SERPL-MCNC: 1.14 MG/DL
EGFR: 64 ML/MIN/1.73M2
EOSINOPHIL # BLD AUTO: 0.13 K/UL
EOSINOPHIL NFR BLD AUTO: 2.9 %
ESTIMATED AVERAGE GLUCOSE: 108 MG/DL
GLUCOSE QUALITATIVE U: NEGATIVE MG/DL
GLUCOSE SERPL-MCNC: 102 MG/DL
HBA1C MFR BLD HPLC: 5.4 %
HCT VFR BLD CALC: 41.4 %
HDLC SERPL-MCNC: 66 MG/DL
HGB BLD-MCNC: 13.9 G/DL
IMM GRANULOCYTES NFR BLD AUTO: 0.2 %
KETONES URINE: NEGATIVE MG/DL
LDLC SERPL CALC-MCNC: 85 MG/DL
LEUKOCYTE ESTERASE URINE: NEGATIVE
LYMPHOCYTES # BLD AUTO: 1.34 K/UL
LYMPHOCYTES NFR BLD AUTO: 30.3 %
MAN DIFF?: NORMAL
MCHC RBC-ENTMCNC: 29.2 PG
MCHC RBC-ENTMCNC: 33.6 GM/DL
MCV RBC AUTO: 87 FL
MONOCYTES # BLD AUTO: 0.34 K/UL
MONOCYTES NFR BLD AUTO: 7.7 %
NEUTROPHILS # BLD AUTO: 2.56 K/UL
NEUTROPHILS NFR BLD AUTO: 58 %
NITRITE URINE: NEGATIVE
NONHDLC SERPL-MCNC: 97 MG/DL
PH URINE: 6
PLATELET # BLD AUTO: 225 K/UL
POTASSIUM SERPL-SCNC: 5.6 MMOL/L
PROT SERPL-MCNC: 7.3 G/DL
PROTEIN URINE: NORMAL MG/DL
RBC # BLD: 4.76 M/UL
RBC # FLD: 13.6 %
SODIUM SERPL-SCNC: 138 MMOL/L
SPECIFIC GRAVITY URINE: 1.02
TRIGL SERPL-MCNC: 61 MG/DL
TSH SERPL-ACNC: 2.92 UIU/ML
UROBILINOGEN URINE: 0.2 MG/DL
WBC # FLD AUTO: 4.42 K/UL

## 2024-11-05 ENCOUNTER — APPOINTMENT (OUTPATIENT)
Dept: CARDIOLOGY | Facility: CLINIC | Age: 84
End: 2024-11-05

## 2024-11-05 PROCEDURE — 93306 TTE W/DOPPLER COMPLETE: CPT

## 2025-02-21 ENCOUNTER — APPOINTMENT (OUTPATIENT)
Dept: CARDIOLOGY | Facility: CLINIC | Age: 85
End: 2025-02-21

## 2025-04-03 ENCOUNTER — RX RENEWAL (OUTPATIENT)
Age: 85
End: 2025-04-03

## 2025-04-29 NOTE — ED PROVIDER NOTE - OBJECTIVE STATEMENT
adult male comes in with 10 days of palapations. today saw cardiologist Dr. palla. Dr Palla placed pt on a holter monitor. pt went home and had a near episode syncope after many palpatations. never had cp or sob. no vomiting. called his cardiologist who said go to ED. pt feels well now. no fevers. pt has cardiac hx, afib, on AC and on Beta blocker. - - -

## 2025-05-01 ENCOUNTER — NON-APPOINTMENT (OUTPATIENT)
Age: 85
End: 2025-05-01

## 2025-05-01 ENCOUNTER — APPOINTMENT (OUTPATIENT)
Dept: CARDIOLOGY | Facility: CLINIC | Age: 85
End: 2025-05-01
Payer: MEDICARE

## 2025-05-01 VITALS
SYSTOLIC BLOOD PRESSURE: 90 MMHG | HEART RATE: 60 BPM | DIASTOLIC BLOOD PRESSURE: 60 MMHG | OXYGEN SATURATION: 98 % | HEIGHT: 68 IN

## 2025-05-01 DIAGNOSIS — I48.0 PAROXYSMAL ATRIAL FIBRILLATION: ICD-10-CM

## 2025-05-01 DIAGNOSIS — E78.5 HYPERLIPIDEMIA, UNSPECIFIED: ICD-10-CM

## 2025-05-01 DIAGNOSIS — I10 ESSENTIAL (PRIMARY) HYPERTENSION: ICD-10-CM

## 2025-05-01 DIAGNOSIS — I34.0 NONRHEUMATIC MITRAL (VALVE) INSUFFICIENCY: ICD-10-CM

## 2025-05-01 DIAGNOSIS — R00.2 PALPITATIONS: ICD-10-CM

## 2025-05-01 DIAGNOSIS — Z95.2 PRESENCE OF PROSTHETIC HEART VALVE: ICD-10-CM

## 2025-05-01 PROCEDURE — G2211 COMPLEX E/M VISIT ADD ON: CPT

## 2025-05-01 PROCEDURE — 93000 ELECTROCARDIOGRAM COMPLETE: CPT

## 2025-05-01 PROCEDURE — 99214 OFFICE O/P EST MOD 30 MIN: CPT

## 2025-05-02 LAB
ALBUMIN SERPL ELPH-MCNC: 4 G/DL
ALP BLD-CCNC: 71 U/L
ALT SERPL-CCNC: 24 U/L
ANION GAP SERPL CALC-SCNC: 13 MMOL/L
AST SERPL-CCNC: 25 U/L
BASOPHILS # BLD AUTO: 0.05 K/UL
BASOPHILS NFR BLD AUTO: 1 %
BILIRUB SERPL-MCNC: 0.6 MG/DL
BUN SERPL-MCNC: 19 MG/DL
CALCIUM SERPL-MCNC: 9 MG/DL
CHLORIDE SERPL-SCNC: 99 MMOL/L
CHOLEST SERPL-MCNC: 159 MG/DL
CO2 SERPL-SCNC: 26 MMOL/L
CREAT SERPL-MCNC: 1.21 MG/DL
EGFRCR SERPLBLD CKD-EPI 2021: 59 ML/MIN/1.73M2
EOSINOPHIL # BLD AUTO: 0.13 K/UL
EOSINOPHIL NFR BLD AUTO: 2.5 %
ESTIMATED AVERAGE GLUCOSE: 117 MG/DL
GLUCOSE SERPL-MCNC: 99 MG/DL
HBA1C MFR BLD HPLC: 5.7 %
HCT VFR BLD CALC: 42 %
HDLC SERPL-MCNC: 61 MG/DL
HGB BLD-MCNC: 13.7 G/DL
IMM GRANULOCYTES NFR BLD AUTO: 0.2 %
LDLC SERPL-MCNC: 84 MG/DL
LYMPHOCYTES # BLD AUTO: 1.47 K/UL
LYMPHOCYTES NFR BLD AUTO: 28.2 %
MAN DIFF?: NORMAL
MCHC RBC-ENTMCNC: 29.7 PG
MCHC RBC-ENTMCNC: 32.6 G/DL
MCV RBC AUTO: 90.9 FL
MONOCYTES # BLD AUTO: 0.43 K/UL
MONOCYTES NFR BLD AUTO: 8.2 %
NEUTROPHILS # BLD AUTO: 3.13 K/UL
NEUTROPHILS NFR BLD AUTO: 59.9 %
NONHDLC SERPL-MCNC: 99 MG/DL
PLATELET # BLD AUTO: 227 K/UL
POTASSIUM SERPL-SCNC: 5.1 MMOL/L
PROT SERPL-MCNC: 6.9 G/DL
RBC # BLD: 4.62 M/UL
RBC # FLD: 13.2 %
SODIUM SERPL-SCNC: 138 MMOL/L
TRIGL SERPL-MCNC: 79 MG/DL
TSH SERPL-ACNC: 2.41 UIU/ML
WBC # FLD AUTO: 5.22 K/UL

## 2025-07-03 ENCOUNTER — RX RENEWAL (OUTPATIENT)
Age: 85
End: 2025-07-03

## 2025-07-29 ENCOUNTER — NON-APPOINTMENT (OUTPATIENT)
Age: 85
End: 2025-07-29

## 2025-07-31 ENCOUNTER — APPOINTMENT (OUTPATIENT)
Dept: CARDIOLOGY | Facility: CLINIC | Age: 85
End: 2025-07-31
Payer: MEDICARE

## 2025-07-31 VITALS
OXYGEN SATURATION: 98 % | SYSTOLIC BLOOD PRESSURE: 114 MMHG | HEIGHT: 68 IN | BODY MASS INDEX: 24.55 KG/M2 | DIASTOLIC BLOOD PRESSURE: 72 MMHG | WEIGHT: 162 LBS | HEART RATE: 50 BPM

## 2025-07-31 DIAGNOSIS — R55 SYNCOPE AND COLLAPSE: ICD-10-CM

## 2025-07-31 DIAGNOSIS — E78.5 HYPERLIPIDEMIA, UNSPECIFIED: ICD-10-CM

## 2025-07-31 DIAGNOSIS — Z98.890 OTHER SPECIFIED POSTPROCEDURAL STATES: ICD-10-CM

## 2025-07-31 DIAGNOSIS — I48.0 PAROXYSMAL ATRIAL FIBRILLATION: ICD-10-CM

## 2025-07-31 DIAGNOSIS — I49.3 VENTRICULAR PREMATURE DEPOLARIZATION: ICD-10-CM

## 2025-07-31 DIAGNOSIS — Z86.79 OTHER SPECIFIED POSTPROCEDURAL STATES: ICD-10-CM

## 2025-07-31 DIAGNOSIS — I10 ESSENTIAL (PRIMARY) HYPERTENSION: ICD-10-CM

## 2025-07-31 PROCEDURE — G2211 COMPLEX E/M VISIT ADD ON: CPT

## 2025-07-31 PROCEDURE — 99214 OFFICE O/P EST MOD 30 MIN: CPT

## 2025-07-31 PROCEDURE — 93000 ELECTROCARDIOGRAM COMPLETE: CPT
